# Patient Record
Sex: MALE | Race: WHITE | NOT HISPANIC OR LATINO | URBAN - METROPOLITAN AREA
[De-identification: names, ages, dates, MRNs, and addresses within clinical notes are randomized per-mention and may not be internally consistent; named-entity substitution may affect disease eponyms.]

---

## 2017-11-03 ENCOUNTER — EMERGENCY (EMERGENCY)
Facility: HOSPITAL | Age: 22
LOS: 0 days | Discharge: HOME | End: 2017-11-04
Attending: EMERGENCY MEDICINE | Admitting: EMERGENCY MEDICINE
Payer: COMMERCIAL

## 2017-11-03 VITALS — HEIGHT: 69 IN | WEIGHT: 139.99 LBS

## 2017-11-03 DIAGNOSIS — F31.9 BIPOLAR DISORDER, UNSPECIFIED: ICD-10-CM

## 2017-11-03 DIAGNOSIS — F19.94 OTHER PSYCHOACTIVE SUBSTANCE USE, UNSPECIFIED WITH PSYCHOACTIVE SUBSTANCE-INDUCED MOOD DISORDER: ICD-10-CM

## 2017-11-03 LAB
ALBUMIN SERPL ELPH-MCNC: 4.1 G/DL — SIGNIFICANT CHANGE UP (ref 3.3–5)
ALP SERPL-CCNC: 51 U/L — SIGNIFICANT CHANGE UP (ref 40–120)
ALT FLD-CCNC: 29 U/L — SIGNIFICANT CHANGE UP (ref 12–78)
AMPHET UR-MCNC: NEGATIVE — SIGNIFICANT CHANGE UP
ANION GAP SERPL CALC-SCNC: 6 MMOL/L — SIGNIFICANT CHANGE UP (ref 5–17)
APAP SERPL-MCNC: <2 UG/ML — LOW (ref 10–30)
APPEARANCE UR: CLEAR — SIGNIFICANT CHANGE UP
APTT BLD: 30.7 SEC — SIGNIFICANT CHANGE UP (ref 27.5–37.4)
AST SERPL-CCNC: 14 U/L — LOW (ref 15–37)
BARBITURATES UR SCN-MCNC: NEGATIVE — SIGNIFICANT CHANGE UP
BASOPHILS # BLD AUTO: 0.1 K/UL — SIGNIFICANT CHANGE UP (ref 0–0.2)
BASOPHILS NFR BLD AUTO: 0.8 % — SIGNIFICANT CHANGE UP (ref 0–2)
BENZODIAZ UR-MCNC: NEGATIVE — SIGNIFICANT CHANGE UP
BILIRUB SERPL-MCNC: 0.5 MG/DL — SIGNIFICANT CHANGE UP (ref 0.2–1.2)
BILIRUB UR-MCNC: NEGATIVE — SIGNIFICANT CHANGE UP
BUN SERPL-MCNC: 23 MG/DL — SIGNIFICANT CHANGE UP (ref 7–23)
CALCIUM SERPL-MCNC: 9.1 MG/DL — SIGNIFICANT CHANGE UP (ref 8.5–10.1)
CHLORIDE SERPL-SCNC: 109 MMOL/L — HIGH (ref 96–108)
CO2 SERPL-SCNC: 25 MMOL/L — SIGNIFICANT CHANGE UP (ref 22–31)
COCAINE METAB.OTHER UR-MCNC: NEGATIVE — SIGNIFICANT CHANGE UP
COLOR SPEC: YELLOW — SIGNIFICANT CHANGE UP
CREAT SERPL-MCNC: 1.18 MG/DL — SIGNIFICANT CHANGE UP (ref 0.5–1.3)
DIFF PNL FLD: NEGATIVE — SIGNIFICANT CHANGE UP
EOSINOPHIL # BLD AUTO: 0.1 K/UL — SIGNIFICANT CHANGE UP (ref 0–0.5)
EOSINOPHIL NFR BLD AUTO: 1.3 % — SIGNIFICANT CHANGE UP (ref 0–6)
ETHANOL SERPL-MCNC: <10 MG/DL — SIGNIFICANT CHANGE UP (ref 0–10)
GLUCOSE SERPL-MCNC: 79 MG/DL — SIGNIFICANT CHANGE UP (ref 70–99)
GLUCOSE UR QL: NEGATIVE MG/DL — SIGNIFICANT CHANGE UP
HCT VFR BLD CALC: 45.6 % — SIGNIFICANT CHANGE UP (ref 39–50)
HGB BLD-MCNC: 15.7 G/DL — SIGNIFICANT CHANGE UP (ref 13–17)
INR BLD: 1.19 RATIO — HIGH (ref 0.88–1.16)
KETONES UR-MCNC: (no result)
LEUKOCYTE ESTERASE UR-ACNC: NEGATIVE — SIGNIFICANT CHANGE UP
LYMPHOCYTES # BLD AUTO: 1.9 K/UL — SIGNIFICANT CHANGE UP (ref 1–3.3)
LYMPHOCYTES # BLD AUTO: 24.2 % — SIGNIFICANT CHANGE UP (ref 13–44)
MCHC RBC-ENTMCNC: 29.3 PG — SIGNIFICANT CHANGE UP (ref 27–34)
MCHC RBC-ENTMCNC: 34.4 GM/DL — SIGNIFICANT CHANGE UP (ref 32–36)
MCV RBC AUTO: 85.1 FL — SIGNIFICANT CHANGE UP (ref 80–100)
METHADONE UR-MCNC: NEGATIVE — SIGNIFICANT CHANGE UP
MONOCYTES # BLD AUTO: 0.4 K/UL — SIGNIFICANT CHANGE UP (ref 0–0.9)
MONOCYTES NFR BLD AUTO: 5.7 % — SIGNIFICANT CHANGE UP (ref 2–14)
NEUTROPHILS # BLD AUTO: 5.3 K/UL — SIGNIFICANT CHANGE UP (ref 1.8–7.4)
NEUTROPHILS NFR BLD AUTO: 68 % — SIGNIFICANT CHANGE UP (ref 43–77)
NITRITE UR-MCNC: NEGATIVE — SIGNIFICANT CHANGE UP
OPIATES UR-MCNC: POSITIVE — SIGNIFICANT CHANGE UP
PCP SPEC-MCNC: SIGNIFICANT CHANGE UP
PCP UR-MCNC: NEGATIVE — SIGNIFICANT CHANGE UP
PH UR: 6 — SIGNIFICANT CHANGE UP (ref 5–8)
PLATELET # BLD AUTO: 265 K/UL — SIGNIFICANT CHANGE UP (ref 150–400)
POTASSIUM SERPL-MCNC: 3.7 MMOL/L — SIGNIFICANT CHANGE UP (ref 3.5–5.3)
POTASSIUM SERPL-SCNC: 3.7 MMOL/L — SIGNIFICANT CHANGE UP (ref 3.5–5.3)
PROT SERPL-MCNC: 7.2 GM/DL — SIGNIFICANT CHANGE UP (ref 6–8.3)
PROT UR-MCNC: NEGATIVE MG/DL — SIGNIFICANT CHANGE UP
PROTHROM AB SERPL-ACNC: 12.9 SEC — HIGH (ref 9.8–12.7)
RBC # BLD: 5.36 M/UL — SIGNIFICANT CHANGE UP (ref 4.2–5.8)
RBC # FLD: 11.5 % — SIGNIFICANT CHANGE UP (ref 10.3–14.5)
SALICYLATES SERPL-MCNC: <1.7 MG/DL — LOW (ref 2.8–20)
SODIUM SERPL-SCNC: 140 MMOL/L — SIGNIFICANT CHANGE UP (ref 135–145)
SP GR SPEC: 1.02 — SIGNIFICANT CHANGE UP (ref 1.01–1.02)
THC UR QL: NEGATIVE — SIGNIFICANT CHANGE UP
UROBILINOGEN FLD QL: NEGATIVE MG/DL — SIGNIFICANT CHANGE UP
WBC # BLD: 7.8 K/UL — SIGNIFICANT CHANGE UP (ref 3.8–10.5)
WBC # FLD AUTO: 7.8 K/UL — SIGNIFICANT CHANGE UP (ref 3.8–10.5)

## 2017-11-03 PROCEDURE — 99284 EMERGENCY DEPT VISIT MOD MDM: CPT

## 2017-11-03 PROCEDURE — 70450 CT HEAD/BRAIN W/O DYE: CPT | Mod: 26

## 2017-11-03 PROCEDURE — 93010 ELECTROCARDIOGRAM REPORT: CPT

## 2017-11-03 RX ORDER — SODIUM CHLORIDE 9 MG/ML
1000 INJECTION INTRAMUSCULAR; INTRAVENOUS; SUBCUTANEOUS ONCE
Qty: 0 | Refills: 0 | Status: COMPLETED | OUTPATIENT
Start: 2017-11-03 | End: 2017-11-03

## 2017-11-03 RX ADMIN — SODIUM CHLORIDE 1500 MILLILITER(S): 9 INJECTION INTRAMUSCULAR; INTRAVENOUS; SUBCUTANEOUS at 19:46

## 2017-11-03 NOTE — ED BEHAVIORAL HEALTH ASSESSMENT NOTE - DETAILS
pt refusing to give consent to speak with the father claims the adderal and the buspar was making him more anxious and nervous pt susts father "with mood swings", and possibly his mother also mother with alcohol claims periods of vision is blurred pt suspects father "with mood swings", and possibly his mother also 1. pt to make appt with Dr Vinson and left message for their office to call pt for the appt

## 2017-11-03 NOTE — ED BEHAVIORAL HEALTH ASSESSMENT NOTE - FAMILY DETAILS
pt is living withhis father and the father's girlfriend pt is living with his father and the father's girlfriend

## 2017-11-03 NOTE — ED ADULT NURSE REASSESSMENT NOTE - NS ED NURSE REASSESS COMMENT FT1
Received patient in bed. A&Ox4, calm, cooperative. 1:1 in progress for safety. Pending psych consult. Fluids in progress. Will continue monitoring patient.

## 2017-11-03 NOTE — ED BEHAVIORAL HEALTH ASSESSMENT NOTE - OTHER PAST PSYCHIATRIC HISTORY (INCLUDE DETAILS REGARDING ONSET, COURSE OF ILLNESS, INPATIENT/OUTPATIENT TREATMENT)
denies any psychiatric hopitalization  sees Dr Vinson on a monthly basis denies any psychiatric hospitalization  sees Dr Vinson on a monthly basis, claimed that he would make a follow up appt with Dr Vinson

## 2017-11-03 NOTE — ED ADULT NURSE NOTE - OBJECTIVE STATEMENT
Pt c/o blurred vision, light flashes in eyes.  Pt states he concerned that he is being poisoned by his father.  Pt also thinks his father had been poisoning his mother.  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  Awaiting room assignment, 1:1 bedside.
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
## 2017-11-03 NOTE — ED BEHAVIORAL HEALTH ASSESSMENT NOTE - NS ED BHA PLAN TR BH CONTACTED FT
call the office of Dr Vinson and indicated that the pt was seen in the ER and needed a follow up appt.

## 2017-11-03 NOTE — ED ADULT TRIAGE NOTE - CHIEF COMPLAINT QUOTE
Pt. to the ED C/O Blurry vision with malaise and weakness x 3 weeks. Pt. states that he came into the ED today because he is feeling concerned and worst- Pt. states " I think I am being poisoned with Monoethylene" Pt. states he suspects because he has family problems. Pt. diaphoretic at this time and appears to be anxious. Pt. 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 Pt. denies trouble speaking, walking, CP and SOB- Hx. of Depression, ADHD and Anxiety- On Adderall, Antidepressants, and Buspar which states he has not taken in 3 days- Last ETOH intake as per patient was last night.

## 2017-11-03 NOTE — ED BEHAVIORAL HEALTH ASSESSMENT NOTE - ADDITIONAL DETAILS / COMMENTS
1. pt is aware that Dr Vinson will be contacted  at [phone], [phone]  2.  Also asked that Dr Vinson office to contact the pt to arrange for follow up visit  with Dr Vinson

## 2017-11-03 NOTE — ED BEHAVIORAL HEALTH ASSESSMENT NOTE - RISK ASSESSMENT
1. pt denies any suicidal ideation or plan .  He also denies any homicidal ideation ort plan  2. pt denies any prior suicidal  attempts  3. no prior psych hospitalization
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
## 2017-11-03 NOTE — ED BEHAVIORAL HEALTH ASSESSMENT NOTE - CURRENT MEDICATION
He has "stopped all of my medications for about a week , as it was making me not sleep well and making more anxious and nervous."

## 2017-11-03 NOTE — ED BEHAVIORAL HEALTH ASSESSMENT NOTE - DESCRIPTION
Pt with good eye contact Alert Speech is mildly pressured but goal directed. He was guarded at first but claims that he wanted to tell about his concerns about his father especially since he had periods of blurred vision. Pt still is under the care of Dr Vinson and he indicated that he was taking the adderall buspar  for the ADHD, however he was "taking only half the dose of the medication "it was too strong for me and I couldn't sleep and it was making me more nervous and had trouble sleeping. He indicated that he is planning to be more "open" with Psychiatrist and to tell her what he is dealing with at home.  "I know that its possible that my eyes are blurred due to the studying and all the reading, or maybe that my father is really doing this. " He indicated that he was going to "have my eye s checked out and I want to talk to my Psychiatrist so she would know what I'm concerned about. "  He denied any suicidal ideation or plan. Denied any homicidal ideation or plan . denies pt claims most of his friends are in law school Pt with good eye contact Alert Speech is mildly pressured but goal directed. He was guarded at first but claims that he wanted to tell about his concerns about his father especially since he had periods of blurred vision. Pt still is under the care of Dr Vinson and he indicated that he was taking the adderall buspar  for the ADHD, however he was "taking only half the dose of the medication "it was too strong for me and I couldn't sleep and it was making me more nervous and had trouble sleeping. He indicated that he is planning to be more "open" with Psychiatrist and to tell her what he is dealing with at home.  "I know that its possible that my eyes are blurred due to the studying and all the reading, or maybe that my father is really doing this. " He indicated that he was going to "have my eye s checked out and I want to talk to my Psychiatrist so she would know what I'm concerned about. "  He denied any suicidal ideation or plan. Denied any homicidal ideation or plan .  Pt periodically questioned his own belief "could be that I'm studying too much."

## 2017-11-03 NOTE — ED ADULT NURSE REASSESSMENT NOTE - NS ED NURSE REASSESS COMMENT FT1
Patient sleeping, cooperative, pending psych consult. 1:1 continues. Will continue monitoring patient.

## 2017-11-03 NOTE — ED STATDOCS - NS_ ATTENDINGSCRIBEDETAILS _ED_A_ED_FT
I, Tony Underwood DO, performed the initial face to face bedside interview with this patient regarding history of present illness and determined that the patient should be seen in the main ED.  The history, was documented by the scribe in my presence and I attest to the accuracy of the documentation.

## 2017-11-04 VITALS
TEMPERATURE: 98 F | HEART RATE: 85 BPM | DIASTOLIC BLOOD PRESSURE: 70 MMHG | OXYGEN SATURATION: 98 % | RESPIRATION RATE: 18 BRPM | SYSTOLIC BLOOD PRESSURE: 127 MMHG

## 2017-11-04 LAB — OSMOLALITY SERPL: 299 MOS/KG — SIGNIFICANT CHANGE UP (ref 275–300)

## 2017-11-04 NOTE — ED PROVIDER NOTE - MEDICAL DECISION MAKING DETAILS
No signs of acute methanol poisoning on labs, normal anion gap, normal serum osm.  CT head WNL.  Pt cleared by psychiatry.  Okay for d/c home.  F/u with psychiatry, ophthalmology.  Return precautions givne.

## 2017-11-04 NOTE — ED PROVIDER NOTE - CARE PLAN
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
## 2017-11-08 PROBLEM — Z00.00 ENCOUNTER FOR PREVENTIVE HEALTH EXAMINATION: Status: ACTIVE | Noted: 2017-11-08

## 2017-11-09 DIAGNOSIS — F19.94 OTHER PSYCHOACTIVE SUBSTANCE USE, UNSPECIFIED WITH PSYCHOACTIVE SUBSTANCE-INDUCED MOOD DISORDER: ICD-10-CM

## 2017-11-09 DIAGNOSIS — H53.149 VISUAL DISCOMFORT, UNSPECIFIED: ICD-10-CM

## 2017-11-09 DIAGNOSIS — R53.83 OTHER FATIGUE: ICD-10-CM

## 2017-11-09 DIAGNOSIS — F31.64 BIPOLAR DISORDER, CURRENT EPISODE MIXED, SEVERE, WITH PSYCHOTIC FEATURES: ICD-10-CM

## 2017-11-09 LAB
FORMATE [MASS/VOLUME] IN SERUM OR PLASMA: 3 MCG/ML — SIGNIFICANT CHANGE UP
FORMATE [MASS/VOLUME] IN SERUM OR PLASMA: SIGNIFICANT CHANGE UP MCG/ML

## 2017-12-18 ENCOUNTER — APPOINTMENT (OUTPATIENT)
Dept: NEUROLOGY | Facility: CLINIC | Age: 22
End: 2017-12-18
Payer: COMMERCIAL

## 2017-12-18 VITALS
BODY MASS INDEX: 20.14 KG/M2 | HEIGHT: 69 IN | WEIGHT: 136 LBS | DIASTOLIC BLOOD PRESSURE: 78 MMHG | OXYGEN SATURATION: 98 % | SYSTOLIC BLOOD PRESSURE: 120 MMHG | HEART RATE: 68 BPM

## 2017-12-18 PROCEDURE — 99204 OFFICE O/P NEW MOD 45 MIN: CPT

## 2017-12-19 RX ORDER — FLUVOXAMINE MALEATE 25 MG/1
25 TABLET, FILM COATED ORAL
Qty: 60 | Refills: 0 | Status: DISCONTINUED | COMMUNITY
Start: 2017-08-29

## 2017-12-19 RX ORDER — ARIPIPRAZOLE 2 MG/1
2 TABLET ORAL
Qty: 30 | Refills: 0 | Status: DISCONTINUED | COMMUNITY
Start: 2017-11-15

## 2018-01-08 ENCOUNTER — OUTPATIENT (OUTPATIENT)
Dept: OUTPATIENT SERVICES | Facility: HOSPITAL | Age: 23
LOS: 1 days | End: 2018-01-08
Payer: COMMERCIAL

## 2018-01-08 DIAGNOSIS — F44.89 OTHER DISSOCIATIVE AND CONVERSION DISORDERS: ICD-10-CM

## 2018-01-08 PROCEDURE — 95812 EEG 41-60 MINUTES: CPT

## 2018-01-23 ENCOUNTER — APPOINTMENT (OUTPATIENT)
Dept: NEUROLOGY | Facility: CLINIC | Age: 23
End: 2018-01-23

## 2018-01-23 PROBLEM — F90.1 ADHD (ATTENTION DEFICIT HYPERACTIVITY DISORDER), PREDOMINANTLY HYPERACTIVE IMPULSIVE TYPE: Status: ACTIVE | Noted: 2018-01-23

## 2018-01-24 ENCOUNTER — APPOINTMENT (OUTPATIENT)
Dept: NEUROLOGY | Facility: CLINIC | Age: 23
End: 2018-01-24
Payer: COMMERCIAL

## 2018-01-24 VITALS
BODY MASS INDEX: 20.44 KG/M2 | OXYGEN SATURATION: 96 % | HEIGHT: 69 IN | SYSTOLIC BLOOD PRESSURE: 120 MMHG | HEART RATE: 98 BPM | WEIGHT: 138 LBS | DIASTOLIC BLOOD PRESSURE: 60 MMHG

## 2018-01-24 DIAGNOSIS — F44.89 OTHER DISSOCIATIVE AND CONVERSION DISORDERS: ICD-10-CM

## 2018-01-24 DIAGNOSIS — F22 DELUSIONAL DISORDERS: ICD-10-CM

## 2018-01-24 PROCEDURE — 99213 OFFICE O/P EST LOW 20 MIN: CPT

## 2018-01-24 RX ORDER — DEXTROAMPHETAMINE SULFATE 30 MG/1
30 TABLET ORAL
Qty: 30 | Refills: 0 | Status: DISCONTINUED | COMMUNITY
Start: 2017-08-29 | End: 2018-01-24

## 2018-01-24 RX ORDER — LISDEXAMFETAMINE DIMESYLATE 30 MG/1
30 CAPSULE ORAL
Qty: 30 | Refills: 0 | Status: DISCONTINUED | COMMUNITY
Start: 2017-08-29 | End: 2018-01-24

## 2018-01-24 RX ORDER — BUSPIRONE HYDROCHLORIDE 5 MG/1
5 TABLET ORAL
Qty: 60 | Refills: 0 | Status: DISCONTINUED | COMMUNITY
Start: 2017-08-29 | End: 2018-01-24

## 2018-01-25 PROBLEM — F22 PARANOID IDEATION: Status: ACTIVE | Noted: 2018-01-25

## 2018-01-25 PROBLEM — F44.89 CONFUSIONAL STATE: Status: RESOLVED | Noted: 2017-12-18 | Resolved: 2018-01-25

## 2018-01-26 ENCOUNTER — APPOINTMENT (OUTPATIENT)
Dept: NEUROLOGY | Facility: CLINIC | Age: 23
End: 2018-01-26

## 2018-01-26 DIAGNOSIS — F90.1 ATTENTION-DEFICIT HYPERACTIVITY DISORDER, PREDOMINANTLY HYPERACTIVE TYPE: ICD-10-CM

## 2018-04-20 ENCOUNTER — APPOINTMENT (OUTPATIENT)
Dept: NEUROLOGY | Facility: CLINIC | Age: 23
End: 2018-04-20

## 2019-03-05 NOTE — ED BEHAVIORAL HEALTH ASSESSMENT NOTE - NS ED BHA BILLING ATTENDING WO NP TRAINEE
Patient is complaining about the early morning when he tried to clear his throat he gets the sputum which is brownish in sometimes some blood in it he was seen by allergist who recommended a chest x-ray for further evaluation chest x-ray is normal patient is about 40 years smoking history he quit smoking about 35 years ago I think it is all postnasal drip and dry area of the mucosa which is causing some bloody postnasal drip but since he has a smoking history I would like to get a CT scan of the chest for further evaluation to rule out any lesion in the lung he really does not have any other symptoms he is not coughing regularly he has a problem with the allergies and sinus problems
Patient was seen last time for leg cramps the leg cramps are better than before he is using the tonic water, they are not completely gone but they are better than before
[id number]

## 2023-08-13 NOTE — ED STATDOCS - NS ED PATIENT SAFETY CONCERN
Right lateral 10th rib injury.  Received abx in the ED.  Aggressive pulmonary hygiene and multimodal pain management.   No

## 2024-05-01 NOTE — ED BEHAVIORAL HEALTH ASSESSMENT NOTE - REMOTE MEMORY
Neisha  Pending    Insurance response  Prescription Drug Insurance: [place] Medicaid   Notes: Faxed prior authorization request to [phone] for signature. Please sign and fax to [facility] [phone], and they will complete the PA.     Please update encounter when faxed to pharmacy. Thank you!      Normal

## 2025-02-07 ENCOUNTER — INPATIENT (INPATIENT)
Facility: HOSPITAL | Age: 30
LOS: 10 days | Discharge: HOME | DRG: 751 | End: 2025-02-18
Attending: PSYCHIATRY & NEUROLOGY | Admitting: PSYCHIATRY & NEUROLOGY
Payer: MEDICAID

## 2025-02-07 VITALS
OXYGEN SATURATION: 100 % | SYSTOLIC BLOOD PRESSURE: 129 MMHG | DIASTOLIC BLOOD PRESSURE: 72 MMHG | TEMPERATURE: 98 F | HEART RATE: 66 BPM | RESPIRATION RATE: 18 BRPM

## 2025-02-07 DIAGNOSIS — F31.9 BIPOLAR DISORDER, UNSPECIFIED: ICD-10-CM

## 2025-02-07 DIAGNOSIS — F29 UNSPECIFIED PSYCHOSIS NOT DUE TO A SUBSTANCE OR KNOWN PHYSIOLOGICAL CONDITION: ICD-10-CM

## 2025-02-07 PROBLEM — F32.9 MAJOR DEPRESSIVE DISORDER, SINGLE EPISODE, UNSPECIFIED: Chronic | Status: ACTIVE | Noted: 2017-11-07

## 2025-02-07 PROBLEM — F41.9 ANXIETY DISORDER, UNSPECIFIED: Chronic | Status: ACTIVE | Noted: 2017-11-07

## 2025-02-07 LAB
ALBUMIN SERPL ELPH-MCNC: 4.3 G/DL — SIGNIFICANT CHANGE UP (ref 3.3–5)
ALP SERPL-CCNC: 40 U/L — SIGNIFICANT CHANGE UP (ref 40–120)
ALT FLD-CCNC: 38 U/L — SIGNIFICANT CHANGE UP (ref 12–78)
AMPHET UR-MCNC: NEGATIVE — SIGNIFICANT CHANGE UP
ANION GAP SERPL CALC-SCNC: 4 MMOL/L — LOW (ref 5–17)
APAP SERPL-MCNC: 20 UG/ML — SIGNIFICANT CHANGE UP (ref 10–30)
APPEARANCE UR: CLEAR — SIGNIFICANT CHANGE UP
AST SERPL-CCNC: 26 U/L — SIGNIFICANT CHANGE UP (ref 15–37)
BARBITURATES UR SCN-MCNC: NEGATIVE — SIGNIFICANT CHANGE UP
BASOPHILS # BLD AUTO: 0.03 K/UL — SIGNIFICANT CHANGE UP (ref 0–0.2)
BASOPHILS NFR BLD AUTO: 0.6 % — SIGNIFICANT CHANGE UP (ref 0–2)
BENZODIAZ UR-MCNC: NEGATIVE — SIGNIFICANT CHANGE UP
BILIRUB SERPL-MCNC: 0.4 MG/DL — SIGNIFICANT CHANGE UP (ref 0.2–1.2)
BILIRUB UR-MCNC: NEGATIVE — SIGNIFICANT CHANGE UP
BUN SERPL-MCNC: 23 MG/DL — SIGNIFICANT CHANGE UP (ref 7–23)
CALCIUM SERPL-MCNC: 9.8 MG/DL — SIGNIFICANT CHANGE UP (ref 8.5–10.1)
CHLORIDE SERPL-SCNC: 105 MMOL/L — SIGNIFICANT CHANGE UP (ref 96–108)
CO2 SERPL-SCNC: 27 MMOL/L — SIGNIFICANT CHANGE UP (ref 22–31)
COCAINE METAB.OTHER UR-MCNC: NEGATIVE — SIGNIFICANT CHANGE UP
COLOR SPEC: YELLOW — SIGNIFICANT CHANGE UP
CREAT SERPL-MCNC: 1.41 MG/DL — HIGH (ref 0.5–1.3)
DIFF PNL FLD: NEGATIVE — SIGNIFICANT CHANGE UP
EGFR: 69 ML/MIN/1.73M2 — SIGNIFICANT CHANGE UP
EOSINOPHIL # BLD AUTO: 0.07 K/UL — SIGNIFICANT CHANGE UP (ref 0–0.5)
EOSINOPHIL NFR BLD AUTO: 1.3 % — SIGNIFICANT CHANGE UP (ref 0–6)
ETHANOL SERPL-MCNC: <10 MG/DL — SIGNIFICANT CHANGE UP (ref 0–10)
FENTANYL UR QL SCN: NEGATIVE — SIGNIFICANT CHANGE UP
FLUAV AG NPH QL: SIGNIFICANT CHANGE UP
FLUBV AG NPH QL: SIGNIFICANT CHANGE UP
GLUCOSE SERPL-MCNC: 108 MG/DL — HIGH (ref 70–99)
GLUCOSE UR QL: NEGATIVE MG/DL — SIGNIFICANT CHANGE UP
HCT VFR BLD CALC: 40.8 % — SIGNIFICANT CHANGE UP (ref 39–50)
HGB BLD-MCNC: 14 G/DL — SIGNIFICANT CHANGE UP (ref 13–17)
IMM GRANULOCYTES NFR BLD AUTO: 0.2 % — SIGNIFICANT CHANGE UP (ref 0–0.9)
KETONES UR-MCNC: NEGATIVE MG/DL — SIGNIFICANT CHANGE UP
LEUKOCYTE ESTERASE UR-ACNC: NEGATIVE — SIGNIFICANT CHANGE UP
LYMPHOCYTES # BLD AUTO: 0.94 K/UL — LOW (ref 1–3.3)
LYMPHOCYTES # BLD AUTO: 17.7 % — SIGNIFICANT CHANGE UP (ref 13–44)
MCHC RBC-ENTMCNC: 28.7 PG — SIGNIFICANT CHANGE UP (ref 27–34)
MCHC RBC-ENTMCNC: 34.3 G/DL — SIGNIFICANT CHANGE UP (ref 32–36)
MCV RBC AUTO: 83.6 FL — SIGNIFICANT CHANGE UP (ref 80–100)
METHADONE UR-MCNC: NEGATIVE — SIGNIFICANT CHANGE UP
MONOCYTES # BLD AUTO: 0.36 K/UL — SIGNIFICANT CHANGE UP (ref 0–0.9)
MONOCYTES NFR BLD AUTO: 6.8 % — SIGNIFICANT CHANGE UP (ref 2–14)
NEUTROPHILS # BLD AUTO: 3.9 K/UL — SIGNIFICANT CHANGE UP (ref 1.8–7.4)
NEUTROPHILS NFR BLD AUTO: 73.4 % — SIGNIFICANT CHANGE UP (ref 43–77)
NITRITE UR-MCNC: NEGATIVE — SIGNIFICANT CHANGE UP
OPIATES UR-MCNC: NEGATIVE — SIGNIFICANT CHANGE UP
PCP SPEC-MCNC: SIGNIFICANT CHANGE UP
PCP UR-MCNC: NEGATIVE — SIGNIFICANT CHANGE UP
PH UR: 6.5 — SIGNIFICANT CHANGE UP (ref 5–8)
PLATELET # BLD AUTO: 219 K/UL — SIGNIFICANT CHANGE UP (ref 150–400)
POTASSIUM SERPL-MCNC: 3.8 MMOL/L — SIGNIFICANT CHANGE UP (ref 3.5–5.3)
POTASSIUM SERPL-SCNC: 3.8 MMOL/L — SIGNIFICANT CHANGE UP (ref 3.5–5.3)
PROT SERPL-MCNC: 6.9 GM/DL — SIGNIFICANT CHANGE UP (ref 6–8.3)
PROT UR-MCNC: NEGATIVE MG/DL — SIGNIFICANT CHANGE UP
RBC # BLD: 4.88 M/UL — SIGNIFICANT CHANGE UP (ref 4.2–5.8)
RBC # FLD: 12.9 % — SIGNIFICANT CHANGE UP (ref 10.3–14.5)
RSV RNA NPH QL NAA+NON-PROBE: SIGNIFICANT CHANGE UP
SALICYLATES SERPL-MCNC: <1.7 MG/DL — LOW (ref 2.8–20)
SARS-COV-2 RNA SPEC QL NAA+PROBE: SIGNIFICANT CHANGE UP
SODIUM SERPL-SCNC: 136 MMOL/L — SIGNIFICANT CHANGE UP (ref 135–145)
SP GR SPEC: 1.01 — SIGNIFICANT CHANGE UP (ref 1–1.03)
THC UR QL: NEGATIVE — SIGNIFICANT CHANGE UP
UROBILINOGEN FLD QL: 0.2 MG/DL — SIGNIFICANT CHANGE UP (ref 0.2–1)
WBC # BLD: 5.31 K/UL — SIGNIFICANT CHANGE UP (ref 3.8–10.5)
WBC # FLD AUTO: 5.31 K/UL — SIGNIFICANT CHANGE UP (ref 3.8–10.5)

## 2025-02-07 PROCEDURE — 90792 PSYCH DIAG EVAL W/MED SRVCS: CPT | Mod: 95

## 2025-02-07 PROCEDURE — 80048 BASIC METABOLIC PNL TOTAL CA: CPT

## 2025-02-07 PROCEDURE — 76770 US EXAM ABDO BACK WALL COMP: CPT

## 2025-02-07 PROCEDURE — 36415 COLL VENOUS BLD VENIPUNCTURE: CPT

## 2025-02-07 PROCEDURE — 99285 EMERGENCY DEPT VISIT HI MDM: CPT

## 2025-02-07 PROCEDURE — 82550 ASSAY OF CK (CPK): CPT

## 2025-02-07 PROCEDURE — 83036 HEMOGLOBIN GLYCOSYLATED A1C: CPT

## 2025-02-07 RX ORDER — LORAZEPAM 4 MG/ML
1 VIAL (ML) INJECTION THREE TIMES A DAY
Refills: 0 | Status: DISCONTINUED | OUTPATIENT
Start: 2025-02-07 | End: 2025-02-08

## 2025-02-07 RX ORDER — OLANZAPINE 10 MG/1
2.5 TABLET ORAL DAILY
Refills: 0 | Status: DISCONTINUED | OUTPATIENT
Start: 2025-02-07 | End: 2025-02-08

## 2025-02-07 RX ORDER — OLANZAPINE 10 MG/1
5 TABLET ORAL AT BEDTIME
Refills: 0 | Status: DISCONTINUED | OUTPATIENT
Start: 2025-02-07 | End: 2025-02-10

## 2025-02-07 NOTE — ED ADULT NURSE REASSESSMENT NOTE - NS ED NURSE REASSESS COMMENT FT1
Plan of care assumed from HERNANDEZ Azar. Pt. resting in stretcher, awaiting  to 5N. Pt. endorsing no further complaints at this time.

## 2025-02-07 NOTE — ED PROVIDER NOTE - CLINICAL SUMMARY MEDICAL DECISION MAKING FREE TEXT BOX
29-year-old male with history of ADHD not on medications reports that he threw a private property today and then had a panic attack.  Patient is repetitive with tangential thoughts reporting that he needs to see someone so he does not throw private property again.  Patient placed on constant observation psych consult medical screening exam. 29-year-old male with history of ADHD not on medications reports that he threw a private property today and then had a panic attack.  Patient is repetitive with tangential thoughts reporting that he needs to see someone so he does not throw private property again.  Patient placed on constant observation psych consult medical screening exam.    Lula DO: patient is medically clear; seen by tele psych; to be admitted to [address] [place] [address].39 paperwork- filled out and faxed to tele psych as requested.

## 2025-02-07 NOTE — ED BEHAVIORAL HEALTH NOTE - BEHAVIORAL HEALTH NOTE
===================  PRE-HOSPITAL COURSE  ===================  SOURCE:  RN and secondhand ED documentation  DETAILS:  Per chart, patient was BIBEMS activated by self, endorsing panic attack. No SI.   =========  ED COURSE  =========  SOURCE:  RN and secondhand ED documentation.  BELONGINGS:  No belongings of note. All belongings were provided to hospital security, and patient currently in a gown with a 1:1 staff member.  BEHAVIOR: RN described patient to be currently perseverative on throwing out his belongings for unclear reason, appears very anxious and manic, impatient, asking to see psychiatrist immediately.  Pt remains in good behavioral and impulse control, is not currently violent/aggressive.  TREATMENT:  Per chart and RN, patient did not require PRN medications.   COLLATERAL:  his uncle, lAf Pastrana, but doesn't know his phone number?
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
## 2025-02-07 NOTE — ED PROVIDER NOTE - INTERNATIONAL TRAVEL
You were hospitalized for pancreatitis.  This is related to a stone in your pancreatic duct.   We consulted your GI team and at this time they did not recommend removing the stone as this is challenging to do in the location it is at and can worsen pancreatitis.   Please avoid fatty foods. Eat bland diet and keep up with hydration with lot of fluid intake.  Please note, it is very important for you to be followed by the GI doctors/team in outpatient setting. We have asked them to make an appointment and the information is in your discharge summary. Please be sure to call if you do not hear from their clinic within a week of discharge   No

## 2025-02-07 NOTE — ED BEHAVIORAL HEALTH ASSESSMENT NOTE - RISK ASSESSMENT
at elevated risk of aggression due to acute mood disturbance, impaired insight, threatening comments, unmedicated antonia

## 2025-02-07 NOTE — ED ADULT NURSE REASSESSMENT NOTE - NS ED NURSE REASSESS COMMENT FT1
pt met w/ telepsych Dr. Brody, pt is to be admitted and is aware of POC. awaiting orders. pt has been requesting a shower since admission, and continues to perseverate on being able to take a shower, pt was told he cannot shower here in ED, he can when he is admitted to room. pt is calm and cooperative at this time. no other complaints at this time. Safety measures in place.

## 2025-02-07 NOTE — ED ADULT TRIAGE NOTE - GLASGOW COMA SCALE: BEST MOTOR RESPONSE, MLM
Continuity of Care Form    Patient Name: Sharad Helton   :  1933  MRN:  [id number]    Admit date:  2021  Discharge date:  21    Code Status Order: Full Code   Advance Directives:      Admitting Physician:  Ita Lee DO  PCP: Glenda Montalvo MD    Discharging Nurse: [facility] Unit/Room#: 5742/7800-T  Discharging Unit Phone Number: [phone]    Emergency Contact:   Extended Emergency Contact Information  Primary Emergency Contact:  [address] Phone: [phone]  Relation: Other  Secondary Emergency Contact: [address] Phone: [phone]  Relation: Brother/Sister    Past Surgical History:  Past Surgical History:   Procedure Laterality Date    CARDIAC DEFIBRILLATOR PLACEMENT  2011    CARDIAC DEFIBRILLATOR PLACEMENT Left 2017    Bi-V gen change, [employer], WRAP-IT study,     CARPAL TUNNEL RELEASE      right arm    CATARACT REMOVAL WITH IMPLANT      DIAGNOSTIC CARDIAC CATH LAB PROCEDURE  3/23/10    Dr. Hien Hopper    left eye       Immunization History:   Immunization History   Administered Date(s) Administered    COVID-19, Pfizer, PF, 30mcg/0.3mL 2021, 2021    Influenza Vaccine, unspecified formulation 10/30/2009, 2012, 2014, 2015, 10/18/2016, 2017    Influenza Virus Vaccine 2012, 11/10/2014, 2015, 10/18/2016, 2017, 2019    Influenza, High Dose (Fluzone 65 yrs and older) 2018    Influenza, Quadv, IM, PF (6 mo and older Fluzone, Flulaval, Fluarix, and 3 yrs and older Afluria) 2019    Influenza, Quadv, adjuvanted, 65 yrs +, IM, PF (Fluad) 10/28/2020    Influenza, Triv, inactivated, subunit, adjuvanted, IM (Fluad 65 yrs and older) 2019    Pneumococcal Polysaccharide ([id number]) 2019    Td (Adult), 5 Lf Tetanus Toxoid, Pf (Tenivac, Decavac) 2021       Active Problems:  Patient Active Problem List   Diagnosis Code    Cardiomyopathy, nonischemic ([facility]) I42.8    Bundle branch block, left I44.7    Asthma J45.909    Biventricular implantable cardioverter-defibrillator in situ Z95.810    CAD (coronary artery disease) I25.10    Diabetes 1.5, 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    Patient in clinical research study Z00.6    Insomnia due to medical condition G47.01    Late onset Alzheimer's disease with behavioral disturbance ([facility]) G30.1, F02.81    PHOEBE (acute kidney injury) ([facility] N17.9    Failure to thrive in adult R62.7    Mild protein-calorie malnutrition ([facility] E44.1    Inferior pubic ramus fracture, left, closed, initial encounter ([facility] S32.472F       Isolation/Infection:   Isolation            No Isolation          Patient Infection Status       Infection Onset Added Last Indicated Last Indicated By Review Planned Expiration Resolved Resolved By    None active    Resolved    COVID-19 (Rule Out) 06/15/21 06/15/21 06/15/21 Respiratory Panel, Molecular, with COVID-19 (Restricted: peds pts or suitable admitted adults) (Ordered)   06/15/21 Rule-Out Test Resulted    COVID-19 (Rule Out) 05/29/20 05/29/20 05/29/20 COVID-19 Ambulatory (Ordered)   06/02/20 Rule-Out Test Resulted            Nurse Assessment:  Last Vital Signs: BP (!) 111/56   Pulse 68   Temp 98 °F (36.7 °C) (Oral)   Resp 16   SpO2 97%     Last documented pain score (0-10 scale):    Last Weight:   Wt Readings from Last 1 Encounters:   11/21/21 145 lb (65.8 kg)     Mental Status:  disoriented, oriented, alert, and patient gets confused & needs re orientation    IV Access:  - None    Nursing Mobility/ADLs:  Walking   Dependent  Transfer  Assisted  Bathing  Assisted  Dressing  Assisted  Toileting  Assisted  Feeding  Independent  Med Admin  Independent  Med Delivery   whole    Wound Care Documentation and Therapy:        Elimination:  Continence:    Bowel: Yes  Bladder: Yes and incontinent and uses bedside commode  Urinary Catheter: None Colostomy/Ileostomy/Ileal Conduit: No       Date of Last BM: 12/23/21  No intake or output data in the 24 hours ending 12/23/21 1001  No intake/output data recorded. Safety Concerns:     History of Falls (last 30 days), At Risk for Falls, and confusion at times    Impairments/Disabilities:      Hearing    Nutrition Therapy:  Current Nutrition Therapy:   - Oral Diet:  General    Routes of Feeding: Oral  Liquids: No Restrictions  Daily Fluid Restriction: no  Last Modified Barium Swallow with Video (Video Swallowing Test): not done    Treatments at the Time of Hospital Discharge:   Respiratory Treatments: ***  Oxygen Therapy:  is not on home oxygen therapy. Ventilator:    - No ventilator support    Rehab Therapies: Physical Therapy and Occupational Therapy  Weight Bearing Status/Restrictions: No weight bearing restirctions  Other Medical Equipment (for information only, NOT a DME order):  wheelchair, walker, and bedside commode  Other Treatments: ***    Patient's personal belongings (please select all that are sent with patient):  {[facility] DME Belongings:[id number]}    RN SIGNATURE:  Electronically signed by Sofia Waller RN on 12/23/21 at 2:15 PM EST    CASE MANAGEMENT/SOCIAL WORK SECTION    Inpatient Status Date: ***    Readmission Risk Assessment Score:  Readmission Risk              Risk of Unplanned Readmission:  19           Discharging to Facility/ Agency   Name: [facility]  Address:  Phone: [phone]  Fax:[phone]    Dialysis Facility (if applicable)   Name:  Address:  Dialysis Schedule:  Phone:  Fax:    / signature: Electronically signed by EVANS Joshi on 12/23/2021 at 2:01 PM    PHYSICIAN SECTION    Prognosis: Fair    Condition at Discharge: Stable    Rehab Potential (if transferring to Rehab):  Fair    Recommended Labs or Other Treatments After Discharge: none    Physician Certification: I certify the above information and transfer of [address]  is (M6) obeys commands

## 2025-02-07 NOTE — ED ADULT NURSE NOTE - OBJECTIVE STATEMENT
Pt presents to ED BIBEMS from home c/o panic attack. Pt states he was at his "childhood home", where he currently resides w/ his dad, throwing away various items when onset of panic attack occurred, pt states this is first time he has ever had panic attack, "I just felt like I was freaking out, and I wanted to be checked out." Pt expressed wanting to be evaluated by MD for anxiety and depression. Pt denies any pain, SOB, dizziness, headache, vision changes, hallucinations at this time, denies any psychiatric or pertinent medical history. NKDA. Pt denies any SI/HI. Pt is calm and cooperative at this time, aware of POC, awaiting psych eval, no other complaints at this time, safety measures in place, belongings brought to security.

## 2025-02-07 NOTE — ED BEHAVIORAL HEALTH ASSESSMENT NOTE - SUMMARY
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    Pt presents with odd behavior, pressured speech, flight of ideas, irritability consistent with manic symptoms and possible bipolar disorder. At this time, it is difficult to clearly distinguish manic behaviors from more psychotic like symptoms. Pt is requesting hospitalization and meets involuntary criteria as he is illogical, showing impaired insight, making violent threatening comments about his father.

## 2025-02-07 NOTE — ED PROVIDER NOTE - OBJECTIVE STATEMENT
Patient is a 29-year-old male with history of ADHD not currently on any medications who reports that he decided to throw away multiple items on private property including TV and electric heater and important papers.  Patient proceeded to then have panic attack and called ambulance.  Patient denies any SI HI reports that he lives with his father who does not know he is in the emergency department at this time.  Patient reports that his father has a restraining order against him and patient has had similar incident  in the past where he had a panic attack and had thrown out property.  Patient reports that he would like to meet with someone so that he does not "throw out private property again."

## 2025-02-07 NOTE — ED ADULT TRIAGE NOTE - CHIEF COMPLAINT QUOTE
Patient presents to the ER with complaints of panic attack. Patient states he started throwing out his tv, keys, heat warmer to clean up his room. Denies SI/HI, druge or alcohol use. Patient calm and cooperative at this time. MD Cotton aware.

## 2025-02-07 NOTE — ED BEHAVIORAL HEALTH ASSESSMENT NOTE - PSYCHIATRIC ISSUES AND PLAN (INCLUDE STANDING AND PRN MEDICATION)
will start olanzapine 2.5mg daily, 5mg qhs, though pt is refusing at this time until further discussion w/ unit psychiatrist

## 2025-02-07 NOTE — ED ADULT NURSE NOTE - NSFALLRISKINTERV_ED_ALL_ED
Assistance OOB with selected safe patient handling equipment if applicable/Assistance with ambulation/Communicate fall risk and risk factors to all staff, patient, and family/Monitor gait and stability/Monitor for mental status changes and reorient to person, place, and time, as needed/Provide visual cue: yellow wristband, yellow gown, etc/Reinforce activity limits and safety measures with patient and family/Toileting schedule using arm’s reach rule for commode and bathroom/Use of alarms - bed, stretcher, chair and/or video monitoring/Call bell, personal items and telephone in reach/Instruct patient to call for assistance before getting out of bed/chair/stretcher/Non-slip footwear applied when patient is off stretcher/[place] to call system/Physically safe environment - no spills, clutter or unnecessary equipment/Purposeful Proactive Rounding/Room/bathroom lighting operational, light cord in reach

## 2025-02-08 PROCEDURE — 99222 1ST HOSP IP/OBS MODERATE 55: CPT

## 2025-02-08 RX ORDER — MELATONIN 5 MG
5 TABLET ORAL AT BEDTIME
Refills: 0 | Status: DISCONTINUED | OUTPATIENT
Start: 2025-02-08 | End: 2025-02-18

## 2025-02-08 RX ORDER — LORAZEPAM 4 MG/ML
2 VIAL (ML) INJECTION EVERY 6 HOURS
Refills: 0 | Status: DISCONTINUED | OUTPATIENT
Start: 2025-02-08 | End: 2025-02-15

## 2025-02-08 RX ORDER — LORAZEPAM 4 MG/ML
1 VIAL (ML) INJECTION EVERY 4 HOURS
Refills: 0 | Status: DISCONTINUED | OUTPATIENT
Start: 2025-02-08 | End: 2025-02-09

## 2025-02-08 RX ORDER — ESCITALOPRAM OXALATE 20 MG/1
5 TABLET ORAL DAILY
Refills: 0 | Status: DISCONTINUED | OUTPATIENT
Start: 2025-02-08 | End: 2025-02-10

## 2025-02-08 RX ORDER — OLANZAPINE 10 MG/1
2.5 TABLET ORAL EVERY 6 HOURS
Refills: 0 | Status: DISCONTINUED | OUTPATIENT
Start: 2025-02-08 | End: 2025-02-18

## 2025-02-08 RX ORDER — OLANZAPINE 10 MG/1
5 TABLET ORAL EVERY 6 HOURS
Refills: 0 | Status: DISCONTINUED | OUTPATIENT
Start: 2025-02-08 | End: 2025-02-18

## 2025-02-08 RX ADMIN — Medication 1 MILLIGRAM(S): at 20:28

## 2025-02-08 RX ADMIN — OLANZAPINE 5 MILLIGRAM(S): 10 TABLET ORAL at 20:29

## 2025-02-08 RX ADMIN — Medication 1 MILLIGRAM(S): at 14:35

## 2025-02-08 RX ADMIN — ESCITALOPRAM OXALATE 5 MILLIGRAM(S): 20 TABLET ORAL at 14:35

## 2025-02-08 NOTE — BH INPATIENT PSYCHIATRY ASSESSMENT NOTE - NSBHASSESSSUMMFT_PSY_ALL_CORE
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    Pt presents with odd behavior, pressured speech, flight of ideas, irritability consistent with manic symptoms and possible bipolar disorder. At this time, it is difficult to clearly distinguish manic behaviors from more psychotic like symptoms. Pt is requesting hospitalization and meets involuntary criteria as he is illogical, showing impaired insight, making violent threatening comments about his father.    Plan:    Admit to inpatient level of care.    Medication discussed with patient because he is refusing to take any.    Educated about his current symptoms and that both patient and provider agree that it is suffering.  In order to address the suffering and discomfort we used medication.  Educated patient about benefits of medication and our goal is to discharge him but if he keeps refusing we will not be able to get to that goal.    He agrees to take medication from panic attacks and anxiety.  Prescribed Ativan 1 mg p.o. as needed every 4 hours for anxiety and also Ativan 2 mg IM for severe agitation without psychosis.    Patient is Zyprexa 50mg  at bedtime encouraged to take.  He reports insomnia and educated patient that he will sleep better with Zyprexa.    Provider returning as needed for insomnia.    Zyprexa and Ativan should not be used within an hour

## 2025-02-08 NOTE — BH INPATIENT PSYCHIATRY ASSESSMENT NOTE - NSBHCHARTREVIEWVS_PSY_A_CORE FT
Vital Signs Last 24 Hrs  T(C): 37.1 (02-07-25 @ 20:36), Max: 37.1 (02-07-25 @ 20:36)  T(F): 98.7 (02-07-25 @ 20:36), Max: 98.7 (02-07-25 @ 20:36)  HR: 59 (02-07-25 @ 18:30) (59 - 63)  BP: 119/66 (02-07-25 @ 18:30) (119/66 - 125/78)  BP(mean): 78 (02-07-25 @ 18:30) (78 - 93)  RR: 17 (02-07-25 @ 20:36) (17 - 19)  SpO2: 100% (02-07-25 @ 20:36) (100% - 100%)    Orthostatic VS  02-07-25 @ 20:36  Lying BP: --/-- HR: --  Sitting BP: 127/69 HR: 74  Standing BP: 114/79 HR: 93  Site: --  Mode: --

## 2025-02-08 NOTE — BH INPATIENT PSYCHIATRY ASSESSMENT NOTE - HPI (INCLUDE ILLNESS QUALITY, SEVERITY, DURATION, TIMING, CONTEXT, MODIFYING FACTORS, ASSOCIATED SIGNS AND SYMPTOMS)
This is 29 years old white man who was admitted July for psychotic outbreak, paranoid, internally preoccupied, has urges to throw objects either his father's or his arm belongings to the garbage which leads to distress and family.    Patient describes panic attacks when he had the sudden next set of attacks of overwhelming anxiety sweating chest pain.    He also said that he gets labile, he starts crying he lays down the floor he cannot tolerate panic attacks.  The same time he says he does not want a medication.      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    Pt consistently reports that for the past week, he has been clearing his home extensively of his and his father's belongings including things like his car keys and various electronics. The patient has been behaving oddly in the ED, perseverating on taking a shower, somewhat psychomotor agitated but able to be redirected. He makes vague comments about having conflicts with his father and says he has "thoughts of harming my father" because he has recently been more irritated by him, but he does not provide any specifics. He says his father has an order of protection against him not for physical violence but due to patient throwing out his father's belongings. Pt says this morning he "freaked out," felt "out of wack." When asked more about this, states he feels like he is "falling" mentally. Pt is requesting psychiatric hospitalization without clear reasoning.    Pt's speech is rapid, flight of ideas.

## 2025-02-08 NOTE — BH INPATIENT PSYCHIATRY ASSESSMENT NOTE - NSBHCHARTREVIEWLAB_PSY_A_CORE FT
14.0   5.31  )-----------( 219      ( 07 Feb 2025 12:31 )             40.8   02-07    136  |  105  |  23  ----------------------------<  108[H]  3.8   |  27  |  1.41[H]    Ca    9.8      07 Feb 2025 12:31    TPro  6.9  /  Alb  4.3  /  TBili  0.4  /  DBili  x   /  AST  26  /  ALT  38  /  AlkPhos  40  02-07

## 2025-02-08 NOTE — H&P ADULT - HISTORY OF PRESENT ILLNESS
"Patient is a 29-year-old male with history of ADHD not currently on any medications who reports that he decided to throw away multiple items on private property including TV and electric heater and important papers.  Patient proceeded to then have panic attack and called ambulance.  Patient denies any SI HI reports that he lives with his father who does not know he is in the emergency department at this time.  Patient reports that his father has a restraining order against him and patient has had similar incident  in the past where he had a panic attack and had thrown out property.  Patient reports that he would like to meet with someone so that he does not "throw out private property again.""    28 yo M with pmh ?bipolar disorder admitted under psychiatry for flight of idea, pressured speech and anxiety. 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 PT now resides with father and was found throwing out fathers personal belongings after he himself called EMS due to a panic attack. Father holds a restraining order against patient. Has prior psychiatric hospitalizations.   Denies thoughts of self harm but has fleeting thoughts of harming his father but has not acted out on it. He denies suicidal ideations. Pt admitted due to psychosis and potential threat to others      EKG: NSR, flat T waves in V1-V2, not acute stt changes, QTc: 418        PMH: ?bipolar disorder  Social: none known. Graduate from law school, unemployed  Fhx: none known

## 2025-02-08 NOTE — BH SOCIAL WORK INITIAL PSYCHOSOCIAL EVALUATION - NSHIGHRISKBEH_PSY_ALL_CORE
Sunscreen Recommendations: Discussed importance of regular photo protection with sunscreen and photo protective clothing.
Nicotinamide Supplementation Recommendations: Discussed with patient the use of nicotinamide 250 mg one pill twice daily
Detail Level: Zone
Yes
Detail Level: Detailed
Sunscreen Recommendations: Discussed continual use of sun protection with sun screen with SPF 30 and photo protective clothing.

## 2025-02-08 NOTE — BH INPATIENT PSYCHIATRY ASSESSMENT NOTE - NSBHMETABOLIC_PSY_ALL_CORE_FT
BMI: BMI (kg/m2): 22.1 (02-07-25 @ 20:36)  HbA1c:   Glucose:   BP: 119/66 (02-07-25 @ 18:30) (119/66 - 129/72)Vital Signs Last 24 Hrs  T(C): 37.1 (02-07-25 @ 20:36), Max: 37.1 (02-07-25 @ 20:36)  T(F): 98.7 (02-07-25 @ 20:36), Max: 98.7 (02-07-25 @ 20:36)  HR: 59 (02-07-25 @ 18:30) (59 - 63)  BP: 119/66 (02-07-25 @ 18:30) (119/66 - 125/78)  BP(mean): 78 (02-07-25 @ 18:30) (78 - 93)  RR: 17 (02-07-25 @ 20:36) (17 - 19)  SpO2: 100% (02-07-25 @ 20:36) (100% - 100%)    Orthostatic VS  02-07-25 @ 20:36  Lying BP: --/-- HR: --  Sitting BP: 127/69 HR: 74  Standing BP: 114/79 HR: 93  Site: --  Mode: --    Lipid Panel:

## 2025-02-08 NOTE — H&P ADULT - NSHPLABSRESULTS_GEN_ALL_CORE
LABS: All Labs Reviewed:                        14.0   5.31  )-----------( 219      ( 07 Feb 2025 12:31 )             40.8     02-07    136  |  105  |  23  ----------------------------<  108[H]  3.8   |  27  |  1.41[H]    Ca    9.8      07 Feb 2025 12:31    TPro  6.9  /  Alb  4.3  /  TBili  0.4  /  DBili  x   /  AST  26  /  ALT  38  /  AlkPhos  40  02-07              Blood Culture:

## 2025-02-08 NOTE — BH INPATIENT PSYCHIATRY ASSESSMENT NOTE - NSDCCRITERIA_PSY_ALL_CORE
Increased acute risk because patient has suicidal thoughts however no plan or intent.  He knows how to communicate that to nurses and he is able to say that he will utilize that if thoughts become too intensive.    Increased long-term risk considering the fact that he is noncompliant and has bipolar disorder.    Protective factors: Supportive family    Mitigation of risk: Inpatient level of care, medication, safety plan.

## 2025-02-08 NOTE — H&P ADULT - NSHPPHYSICALEXAM_GEN_ALL_CORE
ICU Vital Signs Last 24 Hrs  T(C): 37.1 (07 Feb 2025 20:36), Max: 37.1 (07 Feb 2025 20:36)  T(F): 98.7 (07 Feb 2025 20:36), Max: 98.7 (07 Feb 2025 20:36)  HR: 59 (07 Feb 2025 18:30) (59 - 66)  BP: 119/66 (07 Feb 2025 18:30) (119/66 - 129/72)  BP(mean): 78 (07 Feb 2025 18:30) (78 - 93)  ABP: --  ABP(mean): --  RR: 17 (07 Feb 2025 20:36) (17 - 19)  SpO2: 100% (07 Feb 2025 20:36) (100% - 100%)    O2 Parameters below as of 07 Feb 2025 20:36  Patient On (Oxygen Delivery Method): room air            PHYSICAL EXAM:    Constitutional: NAD, awake and alert  HEENT: PERR, EOMI, Normal Hearing, MMM  Neck: Soft and supple, No LAD, No JVD  Respiratory: Breath sounds are clear bilaterally, No wheezing, rales or rhonchi  Cardiovascular: S1 and S2, regular rate and rhythm, no Murmurs, gallops or rubs  Gastrointestinal: Bowel Sounds present, soft, nontender, nondistended, no guarding, no rebound  Extremities: No peripheral edema  Vascular: 2+ peripheral pulses  Neurological: A/O x 3, no focal deficits  Musculoskeletal: 5/5 strength b/l upper and lower extremities  Skin: No rashes

## 2025-02-08 NOTE — BH INPATIENT PSYCHIATRY ASSESSMENT NOTE - NSBHCHARTREVIEWINVESTIGATE_PSY_A_CORE FT
Ventricular Rate 61 BPM    Atrial Rate 61 BPM    P-R Interval 152 ms    QRS Duration 80 ms    Q-T Interval 416 ms    QTC Calculation(Bazett) 418 ms    P Axis 55 degrees    R Axis 77 degrees    T Axis 72 degrees    Diagnosis Line Normal sinus rhythm  Normal ECG  When compared with ECG of 03-NOV-2017 16:17,  No significant change was found  Confirmed by ELIAS ROMANO ([id number]) on 2/7/2025 1:09:25 PM

## 2025-02-08 NOTE — BH SOCIAL WORK INITIAL PSYCHOSOCIAL EVALUATION - NSHIGHRISKBEHFT_PSY_ALL_CORE
Not in treatment, PPHx unknown dx, non-compliance with treatment, family dynamic issues-Legal concerns with father, recent loss of employment

## 2025-02-08 NOTE — BH SOCIAL WORK INITIAL PSYCHOSOCIAL EVALUATION - OTHER PAST PSYCHIATRIC HISTORY (INCLUDE DETAILS REGARDING ONSET, COURSE OF ILLNESS, INPATIENT/OUTPATIENT TREATMENT)
28 yo Male presents with acute anxiety and bizarre behavior, no outpt Tx. Presents with rapid speech, flight of ideas and intense eye contact.    Pt is a 28 yo Single, Male, unemployed living with father in private residence ([address] [place] [address]). 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 Pt reports no actual Psych Dx was seeing private outpt Psych MD/and on meds. Pt reports one prior psychiatric hospitalization 4 months ago at [facility], currently not getting any treatment Pt did not like the MD he was seeing. Denies current substance/alcohol use has hx cocaine use. Denies childhood trauma/abuse, denies SI/HI, denies A/VH.    **  Pt Father has Order of Protection from Pt for "throwing out his belongings" Pt reports he can return home to father's house but does not want our staff to contact father or notify father of Pts admission. Pt did report "thoughts of harming my father" as he has become more irritated by him, Pt he does not provide any specifics or plans to harm father.    Pt reports his uncle can p/u when d/c'd and bring home to father's house...Sw to further discuss with Pt.     Pharm- Jesus Azul    [phone]

## 2025-02-08 NOTE — H&P ADULT - ASSESSMENT
"Patient is a 29-year-old male with history of ADHD not currently on any medications who reports that he decided to throw away multiple items on private property including TV and electric heater and important papers.  Patient proceeded to then have panic attack and called ambulance.  Patient denies any SI HI reports that he lives with his father who does not know he is in the emergency department at this time.  Patient reports that his father has a restraining order against him and patient has had similar incident  in the past where he had a panic attack and had thrown out property.  Patient reports that he would like to meet with someone so that he does not "throw out private property again.""    30 yo M with pmh ?bipolar disorder admitted under psychiatry for flight of idea, pressured speech and anxiety. 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 PT now resides with father and was found throwing out fathers personal belongings after he himself called EMS due to a panic attack. Father holds a restraining order against patient. Has prior psychiatric hospitalizations.   Denies thoughts of self harm but has fleeting thoughts of harming his father but has not acted out on it. He denies suicidal ideations. Pt admitted due to psychosis and potential threat to others      Psychosis  - admitted to [facility]  - No acute homicidal or suicidal ideations  - behavioral therapy et al per primary team      #Elevated Serum creatinine / BULMARO  - Scr : 1.4, prior 1.1 in 2017  - unknown if this is current baseline  - encourage po intake water  - UA negative  - avoid nephrotoxins  - recheck Scr in am  - urine studies  - if not improvement on repeat, or trending up, obtain renal sono      DVT px: Ambulate      Will continue to follow

## 2025-02-08 NOTE — BH INPATIENT PSYCHIATRY ASSESSMENT NOTE - NSBHMSELANG_PSY_A_CORE
Assisted Kari NEGRON (Wound RN), with wound care, non-selective debridement performed using wound cleanser/NS and gauze. Please see Kari NEGRON (Wound RN) wound note for further wound care details.     No abnormalities noted

## 2025-02-08 NOTE — BH INPATIENT PSYCHIATRY ASSESSMENT NOTE - CURRENT MEDICATION
MEDICATIONS  (STANDING):  escitalopram 5 milliGRAM(s) Oral daily  melatonin 5 milliGRAM(s) Oral at bedtime  OLANZapine 5 milliGRAM(s) Oral at bedtime    MEDICATIONS  (PRN):  LORazepam     Tablet 1 milliGRAM(s) Oral every 4 hours PRN Anxiety  LORazepam   Injectable 2 milliGRAM(s) IntraMuscular every 6 hours PRN severe agitation without psychosis  OLANZapine 2.5 milliGRAM(s) Oral every 6 hours PRN moderate psychotic agitation  OLANZapine Injectable 5 milliGRAM(s) IntraMuscular every 6 hours PRN severe psychotic agitation

## 2025-02-08 NOTE — BH SOCIAL WORK INITIAL PSYCHOSOCIAL EVALUATION - NSBHCHILDEVENTS_PSY_ALL_CORE
Pt reports good childhood, no details- has 2 younger sisters living in [place]. Pt graduated from [employer] denies concerns from childhood.

## 2025-02-09 LAB
ANION GAP SERPL CALC-SCNC: 4 MMOL/L — LOW (ref 5–17)
BUN SERPL-MCNC: 27 MG/DL — HIGH (ref 7–23)
CALCIUM SERPL-MCNC: 9.3 MG/DL — SIGNIFICANT CHANGE UP (ref 8.5–10.1)
CHLORIDE SERPL-SCNC: 112 MMOL/L — HIGH (ref 96–108)
CO2 SERPL-SCNC: 26 MMOL/L — SIGNIFICANT CHANGE UP (ref 22–31)
CREAT SERPL-MCNC: 1.4 MG/DL — HIGH (ref 0.5–1.3)
EGFR: 69 ML/MIN/1.73M2 — SIGNIFICANT CHANGE UP
GLUCOSE SERPL-MCNC: 78 MG/DL — SIGNIFICANT CHANGE UP (ref 70–99)
POTASSIUM SERPL-MCNC: 4 MMOL/L — SIGNIFICANT CHANGE UP (ref 3.5–5.3)
POTASSIUM SERPL-SCNC: 4 MMOL/L — SIGNIFICANT CHANGE UP (ref 3.5–5.3)
SODIUM SERPL-SCNC: 142 MMOL/L — SIGNIFICANT CHANGE UP (ref 135–145)

## 2025-02-09 PROCEDURE — 76770 US EXAM ABDO BACK WALL COMP: CPT | Mod: 26

## 2025-02-09 PROCEDURE — 99232 SBSQ HOSP IP/OBS MODERATE 35: CPT

## 2025-02-09 RX ADMIN — OLANZAPINE 5 MILLIGRAM(S): 10 TABLET ORAL at 21:13

## 2025-02-09 RX ADMIN — ESCITALOPRAM OXALATE 5 MILLIGRAM(S): 20 TABLET ORAL at 09:49

## 2025-02-09 RX ADMIN — Medication 1 MILLIGRAM(S): at 09:49

## 2025-02-09 RX ADMIN — Medication 1 MILLIGRAM(S): at 21:13

## 2025-02-10 LAB
A1C WITH ESTIMATED AVERAGE GLUCOSE RESULT: 5.2 % — SIGNIFICANT CHANGE UP (ref 4–5.6)
ANION GAP SERPL CALC-SCNC: 4 MMOL/L — LOW (ref 5–17)
BUN SERPL-MCNC: 29 MG/DL — HIGH (ref 7–23)
CALCIUM SERPL-MCNC: 9.7 MG/DL — SIGNIFICANT CHANGE UP (ref 8.5–10.1)
CHLORIDE SERPL-SCNC: 110 MMOL/L — HIGH (ref 96–108)
CK SERPL-CCNC: 222 U/L — SIGNIFICANT CHANGE UP (ref 26–308)
CO2 SERPL-SCNC: 25 MMOL/L — SIGNIFICANT CHANGE UP (ref 22–31)
CREAT SERPL-MCNC: 1.27 MG/DL — SIGNIFICANT CHANGE UP (ref 0.5–1.3)
EGFR: 78 ML/MIN/1.73M2 — SIGNIFICANT CHANGE UP
ESTIMATED AVERAGE GLUCOSE: 103 MG/DL — SIGNIFICANT CHANGE UP (ref 68–114)
GLUCOSE SERPL-MCNC: 95 MG/DL — SIGNIFICANT CHANGE UP (ref 70–99)
POTASSIUM SERPL-MCNC: 3.9 MMOL/L — SIGNIFICANT CHANGE UP (ref 3.5–5.3)
POTASSIUM SERPL-SCNC: 3.9 MMOL/L — SIGNIFICANT CHANGE UP (ref 3.5–5.3)
SODIUM SERPL-SCNC: 139 MMOL/L — SIGNIFICANT CHANGE UP (ref 135–145)

## 2025-02-10 PROCEDURE — 99232 SBSQ HOSP IP/OBS MODERATE 35: CPT

## 2025-02-10 RX ORDER — TRAZODONE HCL 100 MG
50 TABLET ORAL AT BEDTIME
Refills: 0 | Status: DISCONTINUED | OUTPATIENT
Start: 2025-02-10 | End: 2025-02-18

## 2025-02-10 RX ORDER — ESCITALOPRAM OXALATE 20 MG/1
10 TABLET ORAL DAILY
Refills: 0 | Status: DISCONTINUED | OUTPATIENT
Start: 2025-02-11 | End: 2025-02-18

## 2025-02-10 RX ADMIN — ESCITALOPRAM OXALATE 5 MILLIGRAM(S): 20 TABLET ORAL at 09:05

## 2025-02-10 NOTE — BH INPATIENT PSYCHIATRY PROGRESS NOTE - CURRENT MEDICATION
MEDICATIONS  (STANDING):  melatonin 5 milliGRAM(s) Oral at bedtime    MEDICATIONS  (PRN):  LORazepam     Tablet 1 milliGRAM(s) Oral every 4 hours PRN Anxiety  LORazepam   Injectable 2 milliGRAM(s) IntraMuscular every 6 hours PRN severe agitation without psychosis  OLANZapine 2.5 milliGRAM(s) Oral every 6 hours PRN moderate psychotic agitation  OLANZapine Injectable 5 milliGRAM(s) IntraMuscular every 6 hours PRN severe psychotic agitation  traZODone 50 milliGRAM(s) Oral at bedtime PRN insomnia

## 2025-02-11 PROCEDURE — 99232 SBSQ HOSP IP/OBS MODERATE 35: CPT

## 2025-02-11 RX ADMIN — ESCITALOPRAM OXALATE 10 MILLIGRAM(S): 20 TABLET ORAL at 09:40

## 2025-02-11 NOTE — BH INPATIENT PSYCHIATRY PROGRESS NOTE - CURRENT MEDICATION
MEDICATIONS  (STANDING):  escitalopram 10 milliGRAM(s) Oral daily  melatonin 5 milliGRAM(s) Oral at bedtime    MEDICATIONS  (PRN):  LORazepam     Tablet 1 milliGRAM(s) Oral every 4 hours PRN Anxiety  LORazepam   Injectable 2 milliGRAM(s) IntraMuscular every 6 hours PRN severe agitation without psychosis  OLANZapine 2.5 milliGRAM(s) Oral every 6 hours PRN moderate psychotic agitation  OLANZapine Injectable 5 milliGRAM(s) IntraMuscular every 6 hours PRN severe psychotic agitation  traZODone 50 milliGRAM(s) Oral at bedtime PRN insomnia

## 2025-02-11 NOTE — CONSULT NOTE ADULT - ASSESSMENT
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 Medicine was consulted for abnormal serum Cr, 1.4. Unclear baseline. Last Cr was 1.1 in 2017.     BULMARO vs normal baseline Cr for patient  CrCl 74. Serum Cr here 1.4. Unclear baseline. Last Cr was 1.1 in 2017. Repeat Cr today 1.27. UA unremarkable. No voiding complaints. No recent NSAID use. No contrast enhanced imaging studies. No hypotension. No nephrotoxic agents. Renal bladder US with 3 mm right renal calculus without associated hydronephrosis. Continue to encourage hydration.       Will sign off.     
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 Medicine was consulted for abnormal serum Cr, 1.4. Unclear baseline. Last Cr was 1.1 in 2017.     BULMARO vs normal baseline Cr for patient  CrCl 74. Serum Cr here 1.4. Unclear baseline. Last Cr was 1.1 in 2017. Repeat Cr today 1.4. UA unremarkable. No voiding complaints. No recent NSAID use. No contrast enhanced imaging studies. No hypotension. No nephrotoxic agents.   - Obtain bladder scan post void to assess residual volume (ordered)  - Collect urine for urine sodium, urine creatinine, urine protein/creatinine ratio (ordered)  - Renal bladder ultrasound (ordered)  - Check CPK, Hgb A1c (ordered)      Will continue to follow  
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 Medicine was consulted for abnormal serum Cr, 1.4. Unclear baseline. Last Cr was 1.1 in 2017.     BULMARO vs normal baseline Cr for patient  CrCl 74. Serum Cr here 1.4. Unclear baseline. Last Cr was 1.1 in 2017. Repeat Cr today 1.27. UA unremarkable. No voiding complaints. No recent NSAID use. No contrast enhanced imaging studies. No hypotension. No nephrotoxic agents. Renal bladder US with 3 mm right renal calculus without associated hydronephrosis. Continue to encourage hydration. Will sign off.

## 2025-02-11 NOTE — CONSULT NOTE ADULT - SUBJECTIVE AND OBJECTIVE BOX
Chief Complaint: Panic, psychiatric decompensation    Interval Hx: Patient seen and examined. No new complaints. Says he feels better than presentation but appears anxious, preoccupied. Continues having episodes of staring and looks distracted. Cr is improved today as compared to admission, 1.27 today.     ROS: Multi system review is somewhat limited due to anxiousness, staring episodes, distraction    Vitals:  T(F): 97.8 (10 Feb 2025 07:32), Max: 97.8 (10 Feb 2025 07:32)  RR: 18 (10 Feb 2025 07:32) (18 - 18)  SpO2: 100% (10 Feb 2025 07:32) (100% - 100%) on room air    Exam:  Gen: No acute distress  HEENT: NCAT PERRL EOMI MMM clear oropharynx  Neck: SUpple  CVS: s1 s2 normal, regular, rate normal  Chest: Normal resp effort, clear lungs  Abd: Non distended, +BS, no tenderness  Ext: No edema, normal cap refill  Skin: Warm, dry  Mood: Anxious  Neuro: Awake and alert, answers simple questions, follows simple commands    Labs:              14.0  5.31 )--------( 219            40.8      139  |  110  |  29  ------------------------<  95  3.9   |  25  |  1.27    Ca    9.7          Alcohol < 10   Acetaminophen 20   Salicylate <1.7     Utox negative     UA pH WNL, yellow, clear, glu-, ket-, bld-, prot-, N-, LE-     Micro:  COVID19, flu, RSV PCR 2/7: Negative    Imaging:  None    Cardiac Testing:  EKG 2/7: NSR, rate normal    Meds:  MEDICATIONS  (STANDING):  melatonin 5 milliGRAM(s) Oral at bedtime    MEDICATIONS  (PRN):  LORazepam     Tablet 1 milliGRAM(s) Oral every 4 hours PRN Anxiety  LORazepam   Injectable 2 milliGRAM(s) IntraMuscular every 6 hours PRN severe agitation without psychosis  OLANZapine 2.5 milliGRAM(s) Oral every 6 hours PRN moderate psychotic agitation  OLANZapine Injectable 5 milliGRAM(s) IntraMuscular every 6 hours PRN severe psychotic agitation  traZODone 50 milliGRAM(s) Oral at bedtime PRN insomnia                    
Chief Complaint: Panic, psychiatric decompensation    Interval Hx: Patient seen and examined. No new complaints.     ROS: Multi system review is somewhat limited due to anxiousness, staring episodes, distraction    Vitals:  T(F): 97.6 (09 Feb 2025 07:43), Max: 97.6 (09 Feb 2025 07:43)  HR and BP not recorded  RR: 18 (09 Feb 2025 07:43) (18 - 18)  SpO2: 98% (09 Feb 2025 07:43) (98% - 98%) on room air    Exam:  Gen: No acute distress  HEENT: NCAT PERRL EOMI MMM clear oropharynx  Neck: SUpple  CVS: s1 s2 normal, regular, rate normal  Chest: Normal resp effort, clear lungs  Abd: Non distended, +BS, no tenderness  Ext: No edema, normal cap refill  Skin: Warm, dry  Mood: Anxious  Neuro: Awake and alert, answers simple questions, follows simple commands    Labs:              14.0  5.31 )--------( 219            40.8      142  |  112  |  27  -----------------------<  78  4.0   |  26  |  1.40    Ca 9.3        Alcohol < 10   Acetaminophen 20   Salicylate <1.7     Utox negative     UA pH WNL, yellow, clear, glu-, ket-, bld-, prot-, N-, LE-     Micro:  COVID19, flu, RSV PCR 2/7: Negative    Imaging:  None    Cardiac Testing:  EKG 2/7: NSR, rate normal    Meds:  MEDICATIONS  (STANDING):  escitalopram 5 milliGRAM(s) Oral daily  melatonin 5 milliGRAM(s) Oral at bedtime  OLANZapine 5 milliGRAM(s) Oral at bedtime    MEDICATIONS  (PRN):  LORazepam     Tablet 1 milliGRAM(s) Oral every 4 hours PRN Anxiety  LORazepam   Injectable 2 milliGRAM(s) IntraMuscular every 6 hours PRN severe agitation without psychosis  OLANZapine 2.5 milliGRAM(s) Oral every 6 hours PRN moderate psychotic agitation  OLANZapine Injectable 5 milliGRAM(s) IntraMuscular every 6 hours PRN severe psychotic agitation                            
Chief Complaint: Panic, psychiatric decompensation    Interval Hx: Patient seen and examined. No new complaints. Says he feels better than presentation but appears anxious, preoccupied. Continues having episodes of staring and looks distracted. Cr is improved today as compared to admission, 1.27 today.     ROS: Multi system review is somewhat limited due to anxiousness, staring episodes, distraction    Vitals:  T(F): 97.8 (10 Feb 2025 07:32), Max: 97.8 (10 Feb 2025 07:32)  RR: 18 (10 Feb 2025 07:32) (18 - 18)  SpO2: 100% (10 Feb 2025 07:32) (100% - 100%) on room air    Exam:  Gen: No acute distress  HEENT: NCAT PERRL EOMI MMM clear oropharynx  Neck: SUpple  CVS: s1 s2 normal, regular, rate normal  Chest: Normal resp effort, clear lungs  Abd: Non distended, +BS, no tenderness  Ext: No edema, normal cap refill  Skin: Warm, dry  Mood: Anxious  Neuro: Awake and alert, answers simple questions, follows simple commands    Labs:              14.0  5.31 )--------( 219            40.8      139  |  110  |  29  ------------------------<  95  3.9   |  25  |  1.27    Ca    9.7          Alcohol < 10   Acetaminophen 20   Salicylate <1.7     Utox negative     UA pH WNL, yellow, clear, glu-, ket-, bld-, prot-, N-, LE-     Micro:  COVID19, flu, RSV PCR 2/7: Negative    Imaging:  None    Cardiac Testing:  EKG 2/7: NSR, rate normal    Meds:  MEDICATIONS  (STANDING):  melatonin 5 milliGRAM(s) Oral at bedtime    MEDICATIONS  (PRN):  LORazepam     Tablet 1 milliGRAM(s) Oral every 4 hours PRN Anxiety  LORazepam   Injectable 2 milliGRAM(s) IntraMuscular every 6 hours PRN severe agitation without psychosis  OLANZapine 2.5 milliGRAM(s) Oral every 6 hours PRN moderate psychotic agitation  OLANZapine Injectable 5 milliGRAM(s) IntraMuscular every 6 hours PRN severe psychotic agitation  traZODone 50 milliGRAM(s) Oral at bedtime PRN insomnia

## 2025-02-12 RX ADMIN — ESCITALOPRAM OXALATE 10 MILLIGRAM(S): 20 TABLET ORAL at 10:16

## 2025-02-13 RX ADMIN — Medication 5 MILLIGRAM(S): at 20:44

## 2025-02-13 RX ADMIN — ESCITALOPRAM OXALATE 10 MILLIGRAM(S): 20 TABLET ORAL at 11:06

## 2025-02-14 RX ORDER — OLANZAPINE 10 MG/1
2.5 TABLET ORAL AT BEDTIME
Refills: 0 | Status: DISCONTINUED | OUTPATIENT
Start: 2025-02-14 | End: 2025-02-18

## 2025-02-14 RX ADMIN — ESCITALOPRAM OXALATE 10 MILLIGRAM(S): 20 TABLET ORAL at 09:45

## 2025-02-14 NOTE — BH INPATIENT PSYCHIATRY PROGRESS NOTE - CURRENT MEDICATION
MEDICATIONS  (STANDING):  escitalopram 10 milliGRAM(s) Oral daily  melatonin 5 milliGRAM(s) Oral at bedtime  OLANZapine 2.5 milliGRAM(s) Oral at bedtime    MEDICATIONS  (PRN):  LORazepam     Tablet 1 milliGRAM(s) Oral every 4 hours PRN Anxiety  LORazepam   Injectable 2 milliGRAM(s) IntraMuscular every 6 hours PRN severe agitation without psychosis  OLANZapine 2.5 milliGRAM(s) Oral every 6 hours PRN moderate psychotic agitation  OLANZapine Injectable 5 milliGRAM(s) IntraMuscular every 6 hours PRN severe psychotic agitation  traZODone 50 milliGRAM(s) Oral at bedtime PRN insomnia

## 2025-02-14 NOTE — BH TREATMENT PLAN - NSTXPLANTHERAPYSESSIONSFT_PSY_ALL_CORE
02-10-25  Type of therapy: Symptom management  Type of session: Group  Level of patient participation: Not engaged  Duration of participation: 15 minutes  Therapy conducted by: Psych rehab  Therapy Summary: Patient walked out of group therapy early. Withdrawn. Education and support ongoing.    02-11-25  Type of therapy: Coping skills  Type of session: Group  Level of patient participation: Quiet  Duration of participation: 15 minutes  Therapy conducted by: Psych rehab  Therapy Summary: Pt briefly attended therapy group on coping skills. He was able to identify some methods of coping and shared with the group. Was quiet and withdrawn most of the day.  
  02-10-25  Type of therapy: Symptom management  Type of session: Group  Level of patient participation: Not engaged  Duration of participation: 15 minutes  Therapy conducted by: Psych rehab  Therapy Summary: Patient walked out of group therapy early. Withdrawn. Education and support ongoing.    02-11-25  Type of therapy: Coping skills  Type of session: Group  Level of patient participation: Quiet  Duration of participation: 15 minutes  Therapy conducted by: Psych rehab  Therapy Summary: Pt briefly attended therapy group on coping skills. He was able to identify some methods of coping and shared with the group. Was quiet and withdrawn most of the day.

## 2025-02-14 NOTE — BH TREATMENT PLAN - ANXIETY/PANIC/FEAR NURSING INTERVENTIONS/RECOMMENDATIONS
Acknowledge patient feelings neida they are experiencing.    Identify resources the patient can use at home, in the future, along with a plan to follow for breakthrough episodes of anxiety.     Educate patient on new coping mechanisms or previously used ones that were effective for the patient.
Acknowledge patient feelings neida they are experiencing.    Identify resources the patient can use at home, in the future, along with a plan to follow for breakthrough episodes of anxiety.     Educate patient on new coping mechanisms or previously used ones that were effective for the patient.

## 2025-02-14 NOTE — BH SAFETY PLAN - SUICIDE PREVENTION LIFELINE PHONES
The following were also addressed at this visit:  A detailed physical exam was also done as part of this visit in addition to a Medicare wellness visit     MEDICARE WELLNESS VISIT NOTE    HISTORY OF PRESENT ILLNESS:   Merari Summers presents for her Subsequent Annual Medicare Wellness Visit.   She has complaints or concerns which include hypertension, atrial fibrillation, hyperlipidemia, [employer] pacemaker, pre diabetes,.  COPD, DDD lumbosacral, stroke with cerebral ischemia     Patient Care Team:  Magi Walker MD as PCP - General (Internal Medicine)  Sherrill Chase DO (Neurology)  Dashawn Marquez MD (Dermatology)  Danielle Negron MD (Electrophysiology)  Darek Sandra MD (Ophthalmology)  Ezekiel Pillai MD as Orthopedic Surgeon (Orthopedic Surgery)        Patient Active Problem List   Diagnosis   • Other malignant neoplasm of skin of trunk, except scrotum   • Unspecified glaucoma   • Cystocele, midline   • Rectocele   • Other and unspecified hyperlipidemia   • Chondrocalcinosis, cause unspecified, involving hand(712.34)   • OSTEOPENIA   • Stroke with cerebral ischemia (CMS/HCC)   • Atrial fibrillation (CMS/HCC)   • Hx of nonmelanoma skin cancer   • Anticoagulant long-term use   • Visit for monitoring Tikosyn therapy   • Gout   • Lumbosacral spondylosis without myelopathy   • COPD, severe (CMS/HCC)   • Hx of skin malignancy   • REFUSES ANY ADDITIONAL COLON CANCER SCREENING   • [employer] MRI single pacemaker, 1/28/2020   • CHB s/p AVJ ablation, 1/28/2020   • Ascending aortic aneurysm (CMS/HCC)   • Personal history of tobacco use   • Essential hypertension   • Prediabetes   • Obesity (BMI 30-39.9)         Past Medical History:   Diagnosis Date   • Allergic rhinitis due to pollen    • Ascending aortic aneurysm (CMS/HCC) 2/4/2020   • Atrial fibrillation (CMS/HCC) 6/10/2013    paroxysmal A fib   • CHONDROCALCIN NOS-HAND 7/12/2007   • Cystocele, midline 8/15/2005   • Diverticulitis of colon (without  mention of hemorrhage)(426.47) 8-96   • Gout    • Impaired fasting glucose    • Lumbago    • Obesity, unspecified    • OSTEOPENIA    • Other and unspecified hyperlipidemia    • Other malignant neoplasm of skin of trunk, except scrotum     L. anterior shoulder   • OVERACTIVE BLADDER    • Pain in limb 2008   • Plantar fascial fibromatosis    • Rectocele 8/15/2005   • REFUSES ANY ADDITIONAL COLON CANCER SCREENING    • Renal failure     Mild creat 1.2   • SCC (squamous cell carcinoma)     left tip of nose   • Stroke with cerebral ischemia (CMS/HCC) 2010   • Symptomatic menopausal or female climacteric states     Menopausal Syndrome   • Unspecified essential hypertension    • Unspecified glaucoma(032.2) 3-98   • Unspecified hemorrhoids without mention of complication    • Varicella without mention of complication          Past Surgical History:   Procedure Laterality Date   • Ablation av node - cv  2020   • Appendectomy     • Biopsy of uterus lining  99    Dr. Johnson/ WNL   • Colonoscopy diagnostic  2001    Repeat 2006   • Colonoscopy w biopsy  09    Dr. Crawford, Diverticulosis/Polyps, F/U 5 years   • Flexible sigmoidoscopy diagnostic include specimens  10/11/90    hemorrhoid   • Ppm single implant  2020         Social History     Tobacco Use   • Smoking status: Former Smoker     Packs/day: 1.00     Years: 35.00     Pack years: 35.00     Quit date: 1988     Years since quittin.9   • Smokeless tobacco: Never Used   • Tobacco comment: started at 16   Vaping Use   • Vaping Use: never used   Substance Use Topics   • Alcohol use: Yes     Comment: maybe one per week   • Drug use: No     Drug use:    Drug Use:    No              Family History   Problem Relation Age of Onset   • Cancer Sister         breast   • Cancer Brother        Current Outpatient Medications   Medication Sig Dispense Refill   • nitrofurantoin, macrocrystal-monohydrate, (Macrobid) 100 MG capsule Take 1  capsule by mouth 2 times daily. 10 capsule 0   • atorvastatin (LIPITOR) 40 MG tablet TAKE 1 TABLET BY MOUTH EVERY DAY 90 tablet 3   • lisinopril (ZESTRIL) 40 MG tablet TAKE 1 TABLET BY MOUTH EVERY DAY 90 tablet 3   • TRAVOPROST OP 1 drop in each eye daily.     • hydrALAZINE (APRESOLINE) 25 MG tablet TAKE 1 TABLET BY MOUTH THREE TIMES A  tablet 3   • amLODIPine (NORVASC) 2.5 MG tablet Take 1 tablet by mouth daily. 90 tablet 3   • Eliquis 5 MG Tab TAKE 1 TABLET BY MOUTH EVERY 12 HOURS 180 tablet 3   • umeclidinium-vilanterol (Anoro Ellipta) 62.5-25 MCG/INH inhaler Inhale 1 puff into the lungs daily. 1 each 11   • Multiple Vitamins-Minerals (MULTIVITAMIN ADULTS) Tab Take 1 tablet by mouth daily.     • calcium carbonate-vitamin D (CALTRATE 600+D) 600-400 MG-UNIT per tablet Take 1 tablet by mouth daily.     • Cholecalciferol (VITAMIN D-3) 1000 units Cap Take 1 capsule by mouth daily.       No current facility-administered medications for this visit.        The following items on the Medicare Health Risk Assessment were found to be positive            Vision and Hearing screens: No exam data present    Advance Directive:   The patient has the following documents:  Power of  for Health Care    Cognitive/Functional Status: no evidence of cognitive dysfunction by direct observation    Opioid Review: Merari is not taking opioid medications.    Recent PHQ 2/9 Score:    PHQ 2:  Date Adult PHQ 2 Score Adult PHQ 2 Interpretation   7/7/2021 0 No further screening needed       PHQ 9:       DEPRESSION ASSESSMENT/PLAN:  Depression screening is negative no further plan needed.     There is no height or weight on file to calculate BMI.    BMI ASSESSMENT/PLAN:  Patient BMI is within normal range.     Needed Screening/Treatment:   Cardiovascular screening - Lipids   Needed follow up:  None    See orders.   See Patient Instructions section.   Return in about 1 year (around 12/15/2022) for Medicare Wellness Visit.      OBJECTIVE:  The patient is a 85 year old  female who appears healthy,alert,in no distress.  BUILD:  Body mass index is 31.03 kg/m².   NECK: Neck supple. No masses. No adenopathy. Thyroid symmetric, normal size,  CHEST: chest symmetric with normal A/P diameter, no deformities noted  LUNGS: clear to auscultation and percussion  HEART: PMI normal, regular rate and rhythm, S1 and S2 normal, no S3 or S4, with no gallops, murmurs or rubs and no clicks  ABDOMEN: no guarding, ridigity or tenderness, no renal angle tenderness, no organomegaly, no masses, no free fluid, bowel sounds normal  NEUROLOGIC: skull and spine normal, CN II-XII intact, motor system- bulk, tone and power at all joints normal, sensory system- both superficial and deep normal, cerebellum normal, reflexes normal and symmetric and plantars flexor    ASSESSMENT: Medicare annual wellness visit, subsequent  (primary encounter diagnosis)  Recommended shingles vaccine      Annual physical exam  Plan: PREV EST AGE >64    Other hyperlipidemia  Plan: OFFICE OR OTHER OUTPT VISIT EST PT 20 TO 29         MINS LOW MDM LVL 3  Continue atorvastatin 42    Longstanding persistent atrial fibrillation (CMS/[facility])  Plan: OFFICE OR OTHER OUTPT VISIT EST PT 20 TO 29         MINS LOW MDM LVL 3  On [employer] MRI single pacemaker, 1/28/2020  Plan: OFFICE OR OTHER OUTPT VISIT EST PT 20 TO 29         MINS LOW MDM LVL 3  Sees EP    Essential hypertension  Plan: OFFICE OR OTHER OUTPT VISIT EST PT 20 TO 29         MINS LOW MDM LVL 3  Continue lisinopril 40 hydralazine 25 t.i.d. and amlodipine 2.5    Prediabetes  Plan: OFFICE OR OTHER OUTPT VISIT EST PT 20 TO 29         MINS LOW MDM LVL 3  Has done a really good job with diet    OSTEOPENIA  Plan: OFFICE OR OTHER OUTPT VISIT EST PT 20 TO 29         MINS LOW MDM LVL 3    Lumbosacral spondylosis without myelopathy  Plan: OFFICE OR OTHER OUTPT VISIT EST PT 20 TO 29         MINS LOW MDM LVL 3    COPD, severe  (CMS/[facility])  Plan: OFFICE OR OTHER OUTPT VISIT EST PT 20 TO 29         MINS LOW MDM LVL 3   Stable    More than 25 mins of this visit was spent addressing the  chronic medical problems, including ordering appropriate labs, refilling medications for the whole year and counseling  This was separate from the Medicare wellness visit      Suicide Prevention Lifeline Phone: [phone])

## 2025-02-14 NOTE — BH TREATMENT PLAN - NSTXDCOTHRGOAL_PSY_ALL_CORE
Patient will be referred to the appropriate level of outpatient treatment and have appointments within 3-5 days of discharge.  Patient will be discharged to safe housing either back home or DSS emergency housing.  Benefits in place   will explore need for dual diagnosis tx with appointments if needed.
Patient will be referred to the appropriate level of outpatient treatment and have appointments within 3-5 days of discharge.  Patient will be discharged to safe housing either back home or DSS emergency housing.  Benefits in place   will explore need for dual diagnosis tx with appointments if needed.

## 2025-02-14 NOTE — BH TREATMENT PLAN - NSDCCRITERIA_PSY_ALL_CORE
Increased acute risk because patient has suicidal thoughts however no plan or intent.  He knows how to communicate that to nurses and he is able to say that he will utilize that if thoughts become too intensive.    Protective factors: Supportive family    Mitigation of risk: Inpatient level of care, medication, safety plan.
Increased acute risk because patient has suicidal thoughts however no plan or intent.  He knows how to communicate that to nurses and he is able to say that he will utilize that if thoughts become too intensive.    Increased long-term risk considering the fact that he is noncompliant and has bipolar disorder.    Protective factors: Supportive family    Mitigation of risk: Inpatient level of care, medication, safety plan.

## 2025-02-14 NOTE — BH TREATMENT PLAN - NSTXDCOTHRINTERSW_PSY_ALL_CORE
SW to continue to follow.
MARY met with pt with Psych NP to discuss discharge planning. SW again discussed referrals to [facility] vs. [facility]. Pt signed consent to both [facility] and [facility] . Plan will be for [facility] if pt is discharged with insurance, as pt is currently Medicaid pending and financial counseling is in the process of assisting pt. If pt will not have insurance by the time he is discharged, SW will make referral to [facility]. Pt was provided with education on the importance of allowing contact with his father in order to confirm pt has a safe place to live upon discharge, but pt continued to refuse. Pt indicated he wanted to speak with his father and uncle first before providing consent. SW following up with pt 2 more times after initial conversation. Pt indicated he did not follow up with father and would not sign consent for contact him. Pt indicated that he now might want to stay with his Uncle upon discharge. He indicated he will let SW know either tomorrow or next week. Psych NP updated. MARY sent email to financial counseling for clarification on pt's insurance.

## 2025-02-14 NOTE — BH INPATIENT PSYCHIATRY PROGRESS NOTE - OTHER
denies, but has staring and internal preoccupation 

## 2025-02-15 RX ADMIN — ESCITALOPRAM OXALATE 10 MILLIGRAM(S): 20 TABLET ORAL at 09:09

## 2025-02-16 RX ADMIN — ESCITALOPRAM OXALATE 10 MILLIGRAM(S): 20 TABLET ORAL at 16:39

## 2025-02-17 RX ADMIN — ESCITALOPRAM OXALATE 10 MILLIGRAM(S): 20 TABLET ORAL at 09:18

## 2025-02-18 VITALS — TEMPERATURE: 98 F | OXYGEN SATURATION: 100 % | RESPIRATION RATE: 16 BRPM

## 2025-02-18 PROCEDURE — 99232 SBSQ HOSP IP/OBS MODERATE 35: CPT

## 2025-02-18 RX ORDER — ESCITALOPRAM OXALATE 20 MG/1
10 TABLET ORAL DAILY
Refills: 0 | Status: DISCONTINUED | OUTPATIENT
Start: 2025-02-18 | End: 2025-02-18

## 2025-02-18 RX ORDER — ESCITALOPRAM OXALATE 20 MG/1
1 TABLET ORAL
Refills: 0 | DISCHARGE
Start: 2025-02-18

## 2025-02-18 RX ADMIN — ESCITALOPRAM OXALATE 10 MILLIGRAM(S): 20 TABLET ORAL at 08:55

## 2025-02-18 NOTE — BH INPATIENT PSYCHIATRY DISCHARGE NOTE - NSBHDCRISKMITIGATEFT_PSY_ALL_CORE
Patient with supportive father willing to help.  Patient set up with [facility] clinic for outpatient therapy and medication management

## 2025-02-18 NOTE — BH DISCHARGE NOTE NURSING/SOCIAL WORK/PSYCH REHAB - NSDCPLANREVIEWED_PSY_ALL_CORE
Doctor, nurse, and  reviewed discharge plan with patient who agrees and accepts plan.  Pt provided with a copy of discharge paperwork and agrees to receive a copy of the discharge note in the patient portal.  Pt appropriately dressed for discharge and is transported home by family, friend or taxi to ensure safe arrival home.

## 2025-02-18 NOTE — BH INPATIENT PSYCHIATRY DISCHARGE NOTE - NSBHDCCONDITION_PSY_ALL_CORE
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 Patient also has been feeling sick for the last several days including diarrhea and cough. Patient states that there has been viral syndrome circulating the SNF. Reports R leg is more swollen, heavy with erythema, causes problem with ambulation. In ER he was noted febrile with hypotension. He received zosyn.     #RLE cellulitis  #Possible sepsis with hypotension, tachycardia and fever  #ARF on CRF stage 3  #Allergy to vancomycin and tetracycline  -recent R foot ulcer with osteomyelitis, s/p resection of the R 1st metatarsal head 10/24  -no foot cellulitis   - RVP panel negative   -BC x 2 noted  -on daptomycin 500 mg IV qd and ceftriaxone 2 gm IV qd # 2  -tolerating abx well so far; no side effects noted  -no further loose stools  -podiatry evaluation appreciated   -continue IV abx coverage for now  -plan to change to PO ceftin in AM if continues to improve  -monitor temps  -f/u CBC  -supportive care  2. Other issues:   -care per medicine    Clinical team may change from intravenous to oral antibiotics when the following criteria are met:   1. Patient is clinically improving/stable       a)	Improved signs and symptoms of infection from initial presentation       b)	Afebrile for 24 hours       c)	Leukocytosis trending towards normal range   2. Patient is tolerating oral intake   3. Initial/repeat blood cultures are negative     When above criteria met may change iv antibiotics to an oral ceftin as above    Symptomatic, but no longer needs inpatient level of care

## 2025-02-18 NOTE — BH INPATIENT PSYCHIATRY DISCHARGE NOTE - HOSPITAL COURSE
Pt consistently reports that for the past week, he has been clearing his home extensively of his and his father's belongings including things like his car keys and various electronics. The patient has been behaving oddly in the ED, perseverating on taking a shower, somewhat psychomotor agitated but able to be redirected. He makes vague comments about having conflicts with his father and says he has "thoughts of harming my father" because he has recently been more irritated by him, but he does not provide any specifics. He says his father has an order of protection against him not for physical violence but due to patient throwing out his father's belongings. Pt says this morning he "freaked out," felt "out of wack." When asked more about this, states he feels like he is "falling" mentally. Pt is requesting psychiatric hospitalization without clear reasoning.    Pt's speech is rapid, flight of ideas.    Patient diagnosis changed to OCD.    Patient treated with Lexapro started at 5mg daily PO and was increased to 10mg PO daily    Patient attended groups and therapy while inpatient.    Patient seen today in his room on inpatient unit. Patient reports mood as "good" with full and congruent affect. Patient reports a decrease in his stress about going back living with his father. Patient reports the "need to get rid of things for cleanliness" has continued to decrease. Patient reports feeling well on the medications and denies any side effect or adverse reaction at this time. Patient repost not wishing to take the ZYPREXA at this time. Patient reports good appetite and patient currently reports sleeping well between 6 and 8 hours a night. Continue to discuss with patient the discharge process to which patient reports the desire to follow up with [facility] outpatient for therapy and psychiatrist at this time. Patient is in agreement and demonstrates a willingness to be involved in treatment. Discussed with patients father with patients consent to which the father has no concerns and reports being willing to support the patient in whatever way he needs. Patient reports feeling better at this time. Patient is future and goal oriented in relation to his treatment and seeking help. Patient also expresses a feeling of happiness at the idea of going home in the near future. Patient continues to deny A/V hallucinations, SIIP, HIIP, and S&S of antonia at this time.    Patient with F/u at [facility] 2/19 at 12:30pm with Nancy NIETO and Dr. Vick on 2/25 at 9:30am

## 2025-02-18 NOTE — BH INPATIENT PSYCHIATRY PROGRESS NOTE - NSBHCHARTREVIEWVS_PSY_A_CORE FT
Vital Signs Last 24 Hrs  T(C): 36.6 (02-10-25 @ 07:32), Max: 36.6 (02-10-25 @ 07:32)  T(F): 97.8 (02-10-25 @ 07:32), Max: 97.8 (02-10-25 @ 07:32)  HR: --  BP: --  BP(mean): --  RR: 18 (02-10-25 @ 07:32) (18 - 18)  SpO2: 100% (02-10-25 @ 07:32) (100% - 100%)    Orthostatic VS  02-10-25 @ 07:32  Lying BP: --/-- HR: --  Sitting BP: 120/52 HR: 74  Standing BP: 120/73 HR: 76  Site: upper right arm  Mode: electronic  Orthostatic VS  02-09-25 @ 07:43  Lying BP: --/-- HR: --  Sitting BP: 121/74 HR: 72  Standing BP: 116/78 HR: 89  Site: upper right arm  Mode: electronic  
Vital Signs Last 24 Hrs  T(C): 36.6 (02-13-25 @ 07:22), Max: 36.6 (02-13-25 @ 07:22)  T(F): 97.9 (02-13-25 @ 07:22), Max: 97.9 (02-13-25 @ 07:22)  HR: --  BP: --  BP(mean): --  RR: 19 (02-13-25 @ 07:22) (19 - 19)  SpO2: 98% (02-13-25 @ 07:22) (98% - 98%)    Orthostatic VS  02-13-25 @ 07:22  Lying BP: --/-- HR: --  Sitting BP: 108/77 HR: 66  Standing BP: 111/78 HR: 65  Site: upper right arm  Mode: electronic  Orthostatic VS  02-12-25 @ 07:48  Lying BP: --/-- HR: --  Sitting BP: 123/75 HR: 65  Standing BP: 119/81 HR: 65  Site: upper right arm  Mode: electronic  
Vital Signs Last 24 Hrs  T(C): 36.6 (02-12-25 @ 07:48), Max: 36.6 (02-12-25 @ 07:48)  T(F): 97.8 (02-12-25 @ 07:48), Max: 97.8 (02-12-25 @ 07:48)  HR: --  BP: --  BP(mean): --  RR: 16 (02-12-25 @ 07:48) (16 - 16)  SpO2: 100% (02-12-25 @ 07:48) (100% - 100%)    Orthostatic VS  02-12-25 @ 07:48  Lying BP: --/-- HR: --  Sitting BP: 123/75 HR: 65  Standing BP: 119/81 HR: 65  Site: upper right arm  Mode: electronic  Orthostatic VS  02-11-25 @ 07:26  Lying BP: --/-- HR: --  Sitting BP: 130/87 HR: 73  Standing BP: 122/83 HR: 91  Site: upper right arm  Mode: electronic  
Vital Signs Last 24 Hrs  T(C): 36.4 (02-09-25 @ 07:43), Max: 36.4 (02-09-25 @ 07:43)  T(F): 97.6 (02-09-25 @ 07:43), Max: 97.6 (02-09-25 @ 07:43)  HR: --  BP: --  BP(mean): --  RR: 18 (02-09-25 @ 07:43) (18 - 18)  SpO2: 98% (02-09-25 @ 07:43) (98% - 98%)    Orthostatic VS  02-09-25 @ 07:43  Lying BP: --/-- HR: --  Sitting BP: 121/74 HR: 72  Standing BP: 116/78 HR: 89  Site: upper right arm  Mode: electronic  Orthostatic VS  02-07-25 @ 20:36  Lying BP: --/-- HR: --  Sitting BP: 127/69 HR: 74  Standing BP: 114/79 HR: 93  Site: --  Mode: --  
Vital Signs Last 24 Hrs  T(C): 36.6 (02-11-25 @ 07:26), Max: 36.6 (02-11-25 @ 07:26)  T(F): 97.8 (02-11-25 @ 07:26), Max: 97.8 (02-11-25 @ 07:26)  HR: --  BP: --  BP(mean): --  RR: 16 (02-11-25 @ 07:26) (16 - 16)  SpO2: 100% (02-11-25 @ 07:26) (100% - 100%)    Orthostatic VS  02-11-25 @ 07:26  Lying BP: --/-- HR: --  Sitting BP: 130/87 HR: 73  Standing BP: 122/83 HR: 91  Site: upper right arm  Mode: electronic  Orthostatic VS  02-10-25 @ 07:32  Lying BP: --/-- HR: --  Sitting BP: 120/52 HR: 74  Standing BP: 120/73 HR: 76  Site: upper right arm  Mode: electronic  
Vital Signs Last 24 Hrs  T(C): 36.6 (02-14-25 @ 06:55), Max: 36.6 (02-14-25 @ 06:55)  T(F): 97.8 (02-14-25 @ 06:55), Max: 97.8 (02-14-25 @ 06:55)  HR: --  BP: --  BP(mean): --  RR: 18 (02-14-25 @ 06:55) (18 - 18)  SpO2: 98% (02-14-25 @ 06:55) (98% - 98%)    Orthostatic VS  02-14-25 @ 06:55  Lying BP: --/-- HR: --  Sitting BP: 129/76 HR: 58  Standing BP: 135/83 HR: 64  Site: upper right arm  Mode: electronic  Orthostatic VS  02-13-25 @ 07:22  Lying BP: --/-- HR: --  Sitting BP: 108/77 HR: 66  Standing BP: 111/78 HR: 65  Site: upper right arm  Mode: electronic  
Vital Signs Last 24 Hrs  T(C): 36.7 (02-18-25 @ 07:21), Max: 36.7 (02-18-25 @ 07:21)  T(F): 98 (02-18-25 @ 07:21), Max: 98 (02-18-25 @ 07:21)  HR: --  BP: --  BP(mean): --  RR: 16 (02-18-25 @ 07:21) (16 - 16)  SpO2: 100% (02-18-25 @ 07:21) (100% - 100%)    Orthostatic VS  02-18-25 @ 07:21  Lying BP: --/-- HR: --  Sitting BP: 124/73 HR: 80  Standing BP: 116/78 HR: 100  Site: upper right arm  Mode: electronic  Orthostatic VS  02-17-25 @ 07:07  Lying BP: --/-- HR: --  Sitting BP: 124/73 HR: 79  Standing BP: 121/70 HR: 89  Site: upper right arm  Mode: electronic

## 2025-02-18 NOTE — BH INPATIENT PSYCHIATRY PROGRESS NOTE - NSDCCRITERIA_PSY_ALL_CORE
Increased acute risk because patient has suicidal thoughts however no plan or intent.  He knows how to communicate that to nurses and he is able to say that he will utilize that if thoughts become too intensive.    Increased long-term risk considering the fact that he is noncompliant and has bipolar disorder.    Protective factors: Supportive family    Mitigation of risk: Inpatient level of care, medication, safety plan.
Increased acute risk because patient has suicidal thoughts however no plan or intent.  He knows how to communicate that to nurses and he is able to say that he will utilize that if thoughts become too intensive.    Increased long-term risk considering the fact that he is noncompliant and has bipolar disorder.    Protective factors: Supportive family    Mitigation of risk: Inpatient level of care, medication, safety plan.
Increased acute risk because patient has suicidal thoughts however no plan or intent.  He knows how to communicate that to nurses and he is able to say that he will utilize that if thoughts become too intensive.    Protective factors: Supportive family    Mitigation of risk: Inpatient level of care, medication, safety plan.
Increased acute risk because patient has suicidal thoughts however no plan or intent.  He knows how to communicate that to nurses and he is able to say that he will utilize that if thoughts become too intensive.    Protective factors: Supportive family    Mitigation of risk: Inpatient level of care, medication, safety plan.
Increased acute risk because patient has suicidal thoughts however no plan or intent.  He knows how to communicate that to nurses and he is able to say that he will utilize that if thoughts become too intensive.    Increased long-term risk considering the fact that he is noncompliant and has bipolar disorder.    Protective factors: Supportive family    Mitigation of risk: Inpatient level of care, medication, safety plan.
Increased acute risk because patient has suicidal thoughts however no plan or intent.  He knows how to communicate that to nurses and he is able to say that he will utilize that if thoughts become too intensive.    Protective factors: Supportive family    Mitigation of risk: Inpatient level of care, medication, safety plan.
Increased acute risk because patient has suicidal thoughts however no plan or intent.  He knows how to communicate that to nurses and he is able to say that he will utilize that if thoughts become too intensive.    Protective factors: Supportive family    Mitigation of risk: Inpatient level of care, medication, safety plan.

## 2025-02-18 NOTE — BH INPATIENT PSYCHIATRY DISCHARGE NOTE - OTHER PAST PSYCHIATRIC HISTORY (INCLUDE DETAILS REGARDING ONSET, COURSE OF ILLNESS, INPATIENT/OUTPATIENT TREATMENT)
30 yo Male presents with acute anxiety and bizarre behavior, no outpt Tx. Presents with rapid speech, flight of ideas and intense eye contact.    Pt is a 30 yo Single, Male, unemployed living with father in private residence ([address] [place] [address]). 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 Pt reports no actual Psych Dx was seeing private outpt Psych MD/and on meds. Pt reports one prior psychiatric hospitalization 4 months ago at [place], currently not getting any treatment Pt did not like the MD he was seeing. Denies current substance/alcohol use has hx cocaine use. Denies childhood trauma/abuse, denies SI/HI, denies A/VH.    **  Pt Father has Order of Protection from Pt for "throwing out his belongings" Pt reports he can return home to father's house but does not want our staff to contact father or notify father of Pts admission. Pt did report "thoughts of harming my father" as he has become more irritated by him, Pt he does not provide any specifics or plans to harm father.    Pt reports his uncle can p/u when d/c'd and bring home to father's house...Sw to further discuss with Pt.     Pharm- Jesus Azul    [phone]

## 2025-02-18 NOTE — BH INPATIENT PSYCHIATRY DISCHARGE NOTE - ATTENDING DISCHARGE PHYSICAL EXAMINATION:
Admit patient along, discussed case with nurse practitioner and reviewed the records.  Patient is disappointed not depressed anxiety improved.  He denies having thoughts about harming self and others, he is not at imminent risk to harm self and others, he is not psychotic and he does not present danger to others or self.  I agree with nurse practitioners assessment and plan patient can be discharged.

## 2025-02-18 NOTE — BH INPATIENT PSYCHIATRY PROGRESS NOTE - NSBHASSESSSUMMFT_PSY_ALL_CORE
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    Pt presents with odd behavior, pressured speech, flight of ideas, irritability consistent with manic symptoms and possible bipolar disorder. At this time, it is difficult to clearly distinguish manic behaviors from more psychotic like symptoms. Pt is requesting hospitalization and meets involuntary criteria as he is illogical, showing impaired insight, making violent threatening comments about his father.    Plan:    Admit to inpatient level of care.    Medication discussed with patient because he is refusing to take any.    Educated about his current symptoms and that both patient and provider agree that it is suffering.  In order to address the suffering and discomfort we used medication.  Educated patient about benefits of medication and our goal is to discharge him but if he keeps refusing we will not be able to get to that goal.    He agrees to take medication from panic attacks and anxiety.  Prescribed Ativan 1 mg p.o. as needed every 4 hours for anxiety and also Ativan 2 mg IM for severe agitation without psychosis.    Patient is Zyprexa 50mg  at bedtime encouraged to take.  He reports insomnia and educated patient that he will sleep better with Zyprexa.    Provider returning as needed for insomnia.    Zyprexa and Ativan should not be used within an hour    2/9/25:  Patient reports that he took medication and feels better.  He slept well.  However he continues to be in distress and continues to be anxious with internal preoccupation appearance.    Patient last was his father not to come to visit and does not want us to contact his father.    At this time patient is compliant with medication.  Continue current plan.    Patient is also asking for  to see him and to help him restart his Medicaid.        
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    Patient seen today in his room on inpatient unit. Patient states that he took his medication as advised and that he feels good today. Patient reports that a lot of his stress comes from living with his father. Patient reports the "need to get rid of things for cleanliness". He also states that the need to "discard things" is "far less today" then it has been for the past few days. Patient reports feeling well on the medications and denies any side effect or adverse reaction at this time. Patient reports good appetite and patient currently reports sleeping well at this time despite not using his PRN sleep medications. Reeducated patient to ask if he feels he needs it to which the patient reports he will. Discussed with patient the discharge process to which patient reports the desire to follow up with [facility] outpatient for therapy and psychiatrist at this time. Discussed with patient the need to speak to his father concerning his discharge. Patient reports that he will attempts to get ahold of his uncle as well as his father today as he would like to discuss his plans for after discharge prior to us contacting him. Patient continues to deny A/V hallucinations, SIIP, HIIP, and S&S of antonia at this time.    Plan:    1: Continue with inpatient level of care.    2: Continue with medication as prescribed at this time.
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    Patient seen today in his room on inpatient unit. Patient reports mood as "good" with full and congruent affect. Patient reports a decrease in his stress about going back living with his father. Patient reports the "need to get rid of things for cleanliness" has continued to decrease. Patient reports feeling well on the medications and denies any side effect or adverse reaction at this time. Patient repost not wishing to take the ZYPREXA at this time. Patient reports good appetite and patient currently reports sleeping well between 6 and 8 hours a night. Continue to discuss with patient the discharge process to which patient reports the desire to follow up with [facility] outpatient for therapy and psychiatrist at this time. Patient is in agreement and demonstrates a willingness to be involved in treatment. Discussed with patients father with patients consent to which the father has no concerns and reports being willing to support the patient in whatever way he needs. Patient reports feeling better at this time. Patient is future and goal oriented in relation to his treatment and seeking help. Patient also expresses a feeling of happiness at the idea of going home in the near future. Patient continues to deny A/V hallucinations, SIIP, HIIP, and S&S of antonia at this time.    Plan:    1: Continue with medications as prescribed at this time.    2: Discharge patient home with father with appropriate aftercare.
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    Patient seen today in his room on inpatient unit. Patient states that he took his medication as advised and that he feels good today. Patient continues having episodes of staring and looks distracted although is less today. Patient reports that a lot of his stress comes from living with his father. Patient reports the "need to get rid of things for cleanliness". He also states that the need to "discard things" is "far less today" then it has been for the past few days. Patient maintains that he does not want his father to communicate with us or to come to visit him at this time. Discussed with the patient the increase in his Lexapro from 5mg PO daily to 10mg daily for control of his anxiety related to his OCD. Patient currently reports sleeping well at this time despite not using his PRN sleep medications. Reeducated patient to ask if he feels he needs it to which the patient reports he will. Patient continues to deny A/V hallucinations, SIIP, HIIP, and S&S of antonia at this time.    Plan:    1: Continue with inpatient level of care.    2: Continue with medication as prescribed at this time.
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    Patient states that he took his medication as advised and that he already feels better today. However patient continues to appear anxious and internally preoccupied denying auditory or visual hallucinations. Patient continues having episodes of staring and looks distracted. Patient reports having a PPH of OCD, ADD, and anxiety. Patient reports that a lot of his stress comes from living with his father. Patient reports the "need to get rid of things for cleanliness". He also states that he does not want to return back to reside with his father (except to get his belongings) and that he has an uncle whom he would like to call and see if he can move in with him. Patient maintains that he does not want his father to communicate with us or to come to visit him at this time. Discussed with the patient the desire to increase his does of Lexapro from 5mg PO daily to 10mg daily for control of his anxiety related to his OCD. Patient is in agreement with this coarse of action. He was also made aware of the ability to ask for ativan if feeling overwhelmed by anxiety. Patient displays an understanding of this. Patient request to have his po ZYPREXA D/c at this time. Patient made aware of the possible side effect that stopping the medication can have and he is aware and still wishes to stop this medication. Also discussed with the patient starting Trazadone 50mg PO PRN daily at bedtime. Patient displays an understanding of the need to ask for the medication if he feels he needs it to sleep at night and is in agreement. Patient continues to deny A/V hallucinations, SIIP, and HIIP at this time.    Plan:    1: Admit to inpatient level of care.    2: D/c ZYPREXA 5mg PO daily at this time.    3: Increase Lexapro to 10mg PO daily     4: Start Trazadone 50mg PO PRN at bedtime for insomnia as needed. 
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    Patient seen today in his room on inpatient unit. Patient continues to report that a lot of his stress comes from living with his father. Patient reports the "need to get rid of things for cleanliness" has continued to decrease. Patient reports feeling well on the medications and denies any side effect or adverse reaction at this time. Patient reports good appetite and patient currently reports sleeping well. Continue to discuss with patient the discharge process to which patient reports the desire to follow up with [facility] outpatient for therapy and psychiatrist at this time. Discussed with patient the need to speak to his father concerning his discharge. Patient reeducated on the importance of talking to his father to ensure a safe and appropriate discharge. Patient at this time seen looking over his shoulder repeatedly and viewed having dialogue without the presence of another person present at this time. When asked about is patient reported he has enjoying the music that was playing. It is unclear if this is internal preoccupation at this time. Discussed starting patient on 2.5mg PO of olanzapine at HS at this time to which the patient agreed to. Patient continues to deny A/V hallucinations, SIIP, HIIP, and S&S of antonia at this time.    Plan:    1: Start olanzapine 2.5mg PO at bedtime.    2: Continue with inpatient level of care.    3: Continue with other medications as prescribed at this time.
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    Patient seen today in his room on inpatient unit. Patient states that he took his medication as advised and that he feels good today. Patient reports that a lot of his stress comes from living with his father. Patient reports the "need to get rid of things for cleanliness". He also states that the need to "discard things" is "far less today" then it has been for the past few days. Patient reports feeling well on the medications and denies any side effect or adverse reaction at this time. Patient reports good appetite and patient currently reports sleeping well at this time despite not using his PRN sleep medications. Reeducated patient to ask if he feels he needs it to which the patient reports he will. Discussed with patient the discharge process to which patient reports the desire to follow up with [facility] outpatient for therapy and psychiatrist at this time. Discussed with patient the need to speak to his father concerning his discharge. Patient reports that he will attempts to get a-hold of his uncle as well as his father today as he would like to discuss his plans for after discharge prior to us contacting him. Patient reports not calling either his father or uncles and at this time still is refusing to give consent for this provider or anyone to contact his father. Patient reeducated on the importance of talking to his father to ensure a safe and appropriate discharge. Patient reports that he will continue to think about it. Patient reports concern about his "conflict" with his father. Will revisit the subject in future session. Patient continues to deny A/V hallucinations, SIIP, HIIP, and S&S of antonia at this time.    Plan:    1: Continue with inpatient level of care.    2: Continue with medication as prescribed at this time.

## 2025-02-18 NOTE — BH INPATIENT PSYCHIATRY PROGRESS NOTE - PRN MEDS
Caller: Cherie Soriano    Relationship: Self    Best call back number: [phone]    What is the best time to reach you: ANYTIME    Who are you requesting to speak with (clinical staff, provider,  specific staff member): CLINICAL    What was the call regarding: THE PATIENT STATED PCP SENT A REFERRAL TO AN [facility] OVER A MONTH AGO. THEY HAVE NEVER CALLED TO MAKE AN APPOINTMENT, WHEN SHE CONTACTED THEM, THEY STATED THEY DID NOT RECEIVE THE REFERRAL. SHE WOULD LIKE FOR PCP TO SEND REFERRAL AGAIN AND A CALL BACK TO CONFIRM    Do you require a callback: YES        
Pt of Dr. Arias's called. Stated that she is experiencing a discharge and odor after her surgery on the 18th of March.  Pt is not experiencing any other symptom.   Please advise.   
MEDICATIONS  (PRN):  LORazepam     Tablet 1 milliGRAM(s) Oral every 4 hours PRN Anxiety  LORazepam   Injectable 2 milliGRAM(s) IntraMuscular every 6 hours PRN severe agitation without psychosis  OLANZapine 2.5 milliGRAM(s) Oral every 6 hours PRN moderate psychotic agitation  OLANZapine Injectable 5 milliGRAM(s) IntraMuscular every 6 hours PRN severe psychotic agitation  traZODone 50 milliGRAM(s) Oral at bedtime PRN insomnia  
MEDICATIONS  (PRN):  OLANZapine 2.5 milliGRAM(s) Oral every 6 hours PRN moderate psychotic agitation  OLANZapine Injectable 5 milliGRAM(s) IntraMuscular every 6 hours PRN severe psychotic agitation  traZODone 50 milliGRAM(s) Oral at bedtime PRN insomnia  
MEDICATIONS  (PRN):  LORazepam     Tablet 1 milliGRAM(s) Oral every 4 hours PRN Anxiety  LORazepam   Injectable 2 milliGRAM(s) IntraMuscular every 6 hours PRN severe agitation without psychosis  OLANZapine 2.5 milliGRAM(s) Oral every 6 hours PRN moderate psychotic agitation  OLANZapine Injectable 5 milliGRAM(s) IntraMuscular every 6 hours PRN severe psychotic agitation  
MEDICATIONS  (PRN):  LORazepam     Tablet 1 milliGRAM(s) Oral every 4 hours PRN Anxiety  LORazepam   Injectable 2 milliGRAM(s) IntraMuscular every 6 hours PRN severe agitation without psychosis  OLANZapine 2.5 milliGRAM(s) Oral every 6 hours PRN moderate psychotic agitation  OLANZapine Injectable 5 milliGRAM(s) IntraMuscular every 6 hours PRN severe psychotic agitation  traZODone 50 milliGRAM(s) Oral at bedtime PRN insomnia  
MEDICATIONS  (PRN):  LORazepam     Tablet 1 milliGRAM(s) Oral every 4 hours PRN Anxiety  LORazepam   Injectable 2 milliGRAM(s) IntraMuscular every 6 hours PRN severe agitation without psychosis  OLANZapine 2.5 milliGRAM(s) Oral every 6 hours PRN moderate psychotic agitation  OLANZapine Injectable 5 milliGRAM(s) IntraMuscular every 6 hours PRN severe psychotic agitation  traZODone 50 milliGRAM(s) Oral at bedtime PRN insomnia

## 2025-02-18 NOTE — BH INPATIENT PSYCHIATRY DISCHARGE NOTE - ADDITIONAL DETAILS / COMMENTS
Patient with F/u at [facility] 2/19 at 12:30 with Nancy NIETO and Dr. Vick on 2/25 at 9:30

## 2025-02-18 NOTE — BH INPATIENT PSYCHIATRY PROGRESS NOTE - NSBHATTESTBILLING_PSY_A_CORE
98407-Xzinphajah OBS or IP - moderate complexity OR 35-49 mins
19327-Zvkftloocn OBS or IP - moderate complexity OR 35-49 mins
77255-Axohiewvsc OBS or IP - moderate complexity OR 35-49 mins
50243-Qusstxwafe OBS or IP - moderate complexity OR 35-49 mins
99222-Initial OBS or IP - moderate complexity OR 55-74 mins
48759-Zhnmlsgmee OBS or IP - moderate complexity OR 35-49 mins
05879-Syudqertkf OBS or IP - moderate complexity OR 35-49 mins

## 2025-02-18 NOTE — BH INPATIENT PSYCHIATRY PROGRESS NOTE - NSBHFUPINTERVALCCFT_PSY_A_CORE
"I feel good today"
I feel better after medication.
"I feel good today"
I feel better after medication.

## 2025-02-18 NOTE — BH DISCHARGE NOTE NURSING/SOCIAL WORK/PSYCH REHAB - PATIENT PORTAL LINK FT
You can access the FollowMyHealth Patient Portal offered by [facility] by registering at the following website: [email or web address]. By joining [facility]’s FollowMyHealth portal, you will also be able to view your health information using other applications (apps) compatible with our system.

## 2025-02-18 NOTE — BH INPATIENT PSYCHIATRY PROGRESS NOTE - NSBHATTESTATTENDBILLTIME_PSY_A_CORE
I attest my time as attending is greater than VIRAJ time spent on qualifying patient care activities.

## 2025-02-18 NOTE — BH INPATIENT PSYCHIATRY PROGRESS NOTE - NSBHMSEPERCEPT_PSY_A_CORE
No abnormalities/Other
No abnormalities/Other
No abnormalities
No abnormalities/Other

## 2025-02-18 NOTE — BH INPATIENT PSYCHIATRY PROGRESS NOTE - NSBHMSETHTPROC_PSY_A_CORE
Linear/Overinclusive
Linear/Overinclusive
Flight of ideas/Illogical
Linear/Overinclusive

## 2025-02-18 NOTE — BH DISCHARGE NOTE NURSING/SOCIAL WORK/PSYCH REHAB - NSDCPRGOAL_PSY_ALL_CORE
Pt worked on developing and improving coping skills and safety planning through participation in unit programming.

## 2025-02-18 NOTE — BH INPATIENT PSYCHIATRY PROGRESS NOTE - NSTXDCOTHRGOAL_PSY_ALL_CORE
Patient will be referred to the appropriate level of outpatient treatment and have appointments within 3-5 days of discharge.Patient will be discharged to safe housing either back home or DSS emergency housing.Benefits in placeSocial worker will explore need for dual diagnosis tx with appointments if needed.
Patient will be referred to the appropriate level of outpatient treatment and have appointments within 3-5 days of discharge.  Patient will be discharged to safe housing either back home or DSS emergency housing.  Benefits in place   will explore need for dual diagnosis tx with appointments if needed.

## 2025-02-18 NOTE — BH DISCHARGE NOTE NURSING/SOCIAL WORK/PSYCH REHAB - NSCDUDCCRISIS_PSY_A_CORE
.  [facility] – Crisis Care for Children, Adults and Families  [address] [place] [address]  Mobile Crisis Hotline – (525) 877-8268/.National Suicide Prevention Lifeline 0 (718) 590-6904/.  [facility]  1 (781) LIFENET (226-3247)/.  [facility]  (200) 240-5738/.  [facility] Response Crisis Hotline  (753) 867-4041  24 hour telephone crisis intervention and suicide prevention hotline concerned with all mental health issues/Other.../988 Suicide and Crisis Lifeline

## 2025-02-18 NOTE — BH INPATIENT PSYCHIATRY PROGRESS NOTE - NSBHFUPINTERVALHXFT_PSY_A_CORE
Patient states that he took his medication as advised and that he already feels better today. However patient continues to appear anxious and internally preoccupied denying auditory or visual hallucinations. Patient continues having episodes of staring and looks distracted. Patient reports having a PPH of OCD, ADD, and anxiety. Patient reports that a lot of his stress comes from living with his father. Patient reports the "need to get rid of things for cleanliness". He also states that he does not want to return back to reside with his father (except to get his belongings) and that he has an uncle whom he would like to call and see if he can move in with him. Patient maintains that he does not want his father to communicate with us or to come to visit him at this time. Discussed with the patient the desire to increase his does of Lexapro from 5mg PO daily to 10mg daily for control of his anxiety related to his OCD. Patient is in agreement with this coarse of action. He was also made aware of the ability to ask for ativan if feeling overwhelmed by anxiety. Patient displays an understanding of this. Patient request to have his po ZYPREXA D/c at this time. Patient made aware of the possible side effect that stopping the medication can have and he is aware and still wishes to stop this medication. Also discussed with the patient starting Trazadone 50mg PO PRN daily at bedtime. Patient displays an understanding of the need to ask for the medication if he feels he needs it to sleep at night and is in agreement. Patient continues to deny A/V hallucinations, SIIP, and HIIP at this time.
Patient seen today in his room on inpatient unit. Patient states that he took his medication as advised and that he feels good today. Patient reports that a lot of his stress comes from living with his father. Patient reports the "need to get rid of things for cleanliness". He also states that the need to "discard things" is "far less today" then it has been for the past few days. Patient reports feeling well on the medications and denies any side effect or adverse reaction at this time. Patient reports good appetite and patient currently reports sleeping well at this time despite not using his PRN sleep medications. Reeducated patient to ask if he feels he needs it to which the patient reports he will. Discussed with patient the discharge process to which patient reports the desire to follow up with [facility] outpatient for therapy and psychiatrist at this time. Discussed with patient the need to speak to his father concerning his discharge. Patient reports that he will attempts to get a-hold of his uncle as well as his father today as he would like to discuss his plans for after discharge prior to us contacting him. Patient continues to deny A/V hallucinations, SIIP, HIIP, and S&S of antonia at this time.
Patient seen today in his room on inpatient unit. Patient states that he took his medication as advised and that he feels good today. Patient reports that a lot of his stress comes from living with his father. Patient reports the "need to get rid of things for cleanliness". He also states that the need to "discard things" is "far less today" then it has been for the past few days. Patient reports feeling well on the medications and denies any side effect or adverse reaction at this time. Patient reports good appetite and patient currently reports sleeping well at this time despite not using his PRN sleep medications. Reeducated patient to ask if he feels he needs it to which the patient reports he will. Discussed with patient the discharge process to which patient reports the desire to follow up with [facility] outpatient for therapy and psychiatrist at this time. Discussed with patient the need to speak to his father concerning his discharge. Patient reports that he will attempts to get a-hold of his uncle as well as his father today as he would like to discuss his plans for after discharge prior to us contacting him. Patient reports not calling either his father or uncles and at this time still is refusing to give consent for this provider or anyone to contact his father. Patient reeducated on the importance of talking to his father to ensure a safe and appropriate discharge. Patient reports that he will continue to think about it. Patient reports concern about his "conflict" with his father. Will revisit the subject in future session. Patient continues to deny A/V hallucinations, SIIP, HIIP, and S&S of antonia at this time.
Patient seen today in his room on inpatient unit. Patient continues to report that a lot of his stress comes from living with his father. Patient reports the "need to get rid of things for cleanliness" has continued to decrease. Patient reports feeling well on the medications and denies any side effect or adverse reaction at this time. Patient reports good appetite and patient currently reports sleeping well. Continue to discuss with patient the discharge process to which patient reports the desire to follow up with [facility] outpatient for therapy and psychiatrist at this time. Discussed with patient the need to speak to his father concerning his discharge. Patient reeducated on the importance of talking to his father to ensure a safe and appropriate discharge. Patient at this time seen looking over his shoulder repeatedly and viewed having dialogue without the presence of another person present at this time. When asked about is patient reported he has enjoying the music that was playing. It is unclear if this is internal preoccupation at this time. Discussed starting patient on 2.5mg PO of olanzapine at  at this time to which the patient agreed to. Patient continues to deny A/V hallucinations, SIIP, HIIP, and S&S of antonia at this time.
Patient states that he took his medication as advised and that he already feels better.  However he appears anxious and internally preoccupied denying auditory donations.    He has episodes of staring and looks distracted.    He continues to be depressed and anxious.  However he is taking medication.    He reported that he slept well last night.    He also states that he does not want to return back to reside with his father and that he has an uncle whom he would like to call and see if he can go there.    Patient bring his father for all probable and for him throwing away items that belong to him and to his father and believes that the source of for problems.  Patient does not want his father to communicate with us or to come to visit him.
Patient seen today in his room on inpatient unit. Patient states that he took his medication as advised and that he feels good today. Patient continues having episodes of staring and looks distracted although is less today. Patient reports that a lot of his stress comes from living with his father. Patient reports the "need to get rid of things for cleanliness". He also states that the need to "discard things" is "far less today" then it has been for the past few days. Patient maintains that he does not want his father to communicate with us or to come to visit him at this time. Discussed with the patient the increase in his Lexapro from 5mg PO daily to 10mg daily for control of his anxiety related to his OCD. Patient currently reports sleeping well at this time despite not using his PRN sleep medications. Reeducated patient to ask if he feels he needs it to which the patient reports he will. Patient continues to deny A/V hallucinations, SIIP, HIIP, and S&S of antonia at this time.
Patient seen today in his room on inpatient unit. Patient reports mood as "good" with full and congruent affect. Patient reports a decrease in his stress about going back living with his father. Patient reports the "need to get rid of things for cleanliness" has continued to decrease. Patient reports feeling well on the medications and denies any side effect or adverse reaction at this time. Patient repost not wishing to take the ZYPREXA at this time. Patient reports good appetite and patient currently reports sleeping well between 6 and 8 hours a night. Continue to discuss with patient the discharge process to which patient reports the desire to follow up with [facility] outpatient for therapy and psychiatrist at this time. Patient is in agreement and demonstrates a willingness to be involved in treatment. Discussed with patients father with patients consent to which the father has no concerns and reports being willing to support the patient in whatever way he needs. Patient reports feeling better at this time. Patient is future and goal oriented in relation to his treatment and seeking help. Patient also expresses a feeling of happiness at the idea of going home in the near future. Patient continues to deny A/V hallucinations, SIIP, HIIP, and S&S of antonia at this time.

## 2025-02-18 NOTE — BH INPATIENT PSYCHIATRY DISCHARGE NOTE - NSBHMETABOLIC_PSY_ALL_CORE_FT
BMI: BMI (kg/m2): 22.1 (02-07-25 @ 20:36)  HbA1c: A1C with Estimated Average Glucose Result: 5.2 % (02-10-25 @ 08:54)    Glucose:   BP: --Vital Signs Last 24 Hrs  T(C): 36.7 (02-18-25 @ 07:21), Max: 36.7 (02-18-25 @ 07:21)  T(F): 98 (02-18-25 @ 07:21), Max: 98 (02-18-25 @ 07:21)  HR: --  BP: --  BP(mean): --  RR: 16 (02-18-25 @ 07:21) (16 - 16)  SpO2: 100% (02-18-25 @ 07:21) (100% - 100%)    Orthostatic VS  02-18-25 @ 07:21  Lying BP: --/-- HR: --  Sitting BP: 124/73 HR: 80  Standing BP: 116/78 HR: 100  Site: upper right arm  Mode: electronic  Orthostatic VS  02-17-25 @ 07:07  Lying BP: --/-- HR: --  Sitting BP: 124/73 HR: 79  Standing BP: 121/70 HR: 89  Site: upper right arm  Mode: electronic    Lipid Panel:

## 2025-02-18 NOTE — BH INPATIENT PSYCHIATRY DISCHARGE NOTE - HPI (INCLUDE ILLNESS QUALITY, SEVERITY, DURATION, TIMING, CONTEXT, MODIFYING FACTORS, ASSOCIATED SIGNS AND SYMPTOMS)
This is 29 years old white man who was admitted July for psychotic outbreak, paranoid, internally preoccupied, has urges to throw objects either his father's or his arm belongings to the garbage which leads to distress and family.    Patient describes panic attacks when he had the sudden next set of attacks of overwhelming anxiety sweating chest pain.    He also said that he gets labile, he starts crying he lays down the floor he cannot tolerate panic attacks.  The same time he says he does not want a medication.      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    Pt consistently reports that for the past week, he has been clearing his home extensively of his and his father's belongings including things like his car keys and various electronics. The patient has been behaving oddly in the ED, perseverating on taking a shower, somewhat psychomotor agitated but able to be redirected. He makes vague comments about having conflicts with his father and says he has "thoughts of harming my father" because he has recently been more irritated by him, but he does not provide any specifics. He says his father has an order of protection against him not for physical violence but due to patient throwing out his father's belongings. Pt says this morning he "freaked out," felt "out of wack." When asked more about this, states he feels like he is "falling" mentally. Pt is requesting psychiatric hospitalization without clear reasoning.    Pt's speech is rapid, flight of ideas.

## 2025-02-18 NOTE — BH INPATIENT PSYCHIATRY PROGRESS NOTE - CURRENT MEDICATION
MEDICATIONS  (STANDING):  escitalopram 10 milliGRAM(s) Oral daily  melatonin 5 milliGRAM(s) Oral at bedtime  OLANZapine 2.5 milliGRAM(s) Oral at bedtime    MEDICATIONS  (PRN):  OLANZapine 2.5 milliGRAM(s) Oral every 6 hours PRN moderate psychotic agitation  OLANZapine Injectable 5 milliGRAM(s) IntraMuscular every 6 hours PRN severe psychotic agitation  traZODone 50 milliGRAM(s) Oral at bedtime PRN insomnia   MEDICATIONS  (STANDING):  escitalopram 10 milliGRAM(s) Oral daily  escitalopram 10 milliGRAM(s) Oral daily  melatonin 5 milliGRAM(s) Oral at bedtime  OLANZapine 2.5 milliGRAM(s) Oral at bedtime    MEDICATIONS  (PRN):  OLANZapine 2.5 milliGRAM(s) Oral every 6 hours PRN moderate psychotic agitation  OLANZapine Injectable 5 milliGRAM(s) IntraMuscular every 6 hours PRN severe psychotic agitation  traZODone 50 milliGRAM(s) Oral at bedtime PRN insomnia

## 2025-02-18 NOTE — BH INPATIENT PSYCHIATRY PROGRESS NOTE - ATTENDING COMMENTS
I saw the patient along.  Patient reports feeling better.  He denies being depressed.  His anxiety significantly improved.  Patient denies responding internal stimuli and hearing voices.  He has no thoughts about harming self or anybody else.  Patient wants to be moving back to his father and he and his father are talking.  Patient has anticipatory anxiety about being discharged and would like to go home as soon as possible.  (Time patient is not at imminent risk and his acute risk is low so he can safely discharged.

## 2025-02-18 NOTE — BH DISCHARGE NOTE NURSING/SOCIAL WORK/PSYCH REHAB - NSDCADDINFO2FT_PSY_ALL_CORE
Pt has an in person aftercare appointment scheduled on 2/25 with Dr. Vick from 9:30 AM to 11:30 AM at [facility].

## 2025-02-18 NOTE — BH INPATIENT PSYCHIATRY DISCHARGE NOTE - NSDCPROCEDURESFT_PSY_ALL_CORE
< from: 12 Lead ECG (02.07.25 @ 12:35) >    Ventricular Rate 61 BPM    Atrial Rate 61 BPM    P-R Interval 152 ms    QRS Duration 80 ms    Q-T Interval 416 ms    QTC Calculation(Bazett) 418 ms    P Axis 55 degrees    R Axis 77 degrees    T Axis 72 degrees    Diagnosis Line Normal sinus rhythm  Normal ECG  When compared with ECG of 03-NOV-2017 16:17,  No significant change was found  Confirmed by ELIAS ROMANO ([id number]) on 2/7/2025 1:09:25 PM    A1C with Estimated Average Glucose Result: 5.2: Method: Immunoassay     Basic Metabolic Panel in AM (02.10.25 @ 08:54)   Sodium: 139 mmol/L  Potassium: 3.9 mmol/L  Chloride: 110 mmol/L  Carbon Dioxide: 25 mmol/L  Anion Gap: 4 mmol/L  Blood Urea Nitrogen: 29 mg/dL  Creatinine: 1.27 mg/dL  Glucose: 95 mg/dL  Calcium: 9.7 mg/dL  eGFR: 78:    Complete Blood Count + Automated Diff (02.07.25 @ 12:31)   WBC Count: 5.31 K/uL  RBC Count: 4.88 M/uL  Hemoglobin: 14.0 g/dL  Hematocrit: 40.8 %  Mean Cell Volume: 83.6 fl  Mean Cell Hemoglobin: 28.7 pg  Mean Cell Hemoglobin Conc: 34.3 g/dL  Red Cell Distrib Width: 12.9 %  Platelet Count - Automated: 219 K/uL  Auto Neutrophil #: 3.90 K/uL  Auto Lymphocyte #: 0.94 K/uL  Auto Monocyte #: 0.36 K/uL  Auto Eosinophil #: 0.07 K/uL  Auto Basophil #: 0.03 K/uL  Auto Neutrophil %: 73.4: Differential percentages must be correlated with absolute numbers for   clinical significance. %  Auto Lymphocyte %: 17.7 %  Auto Monocyte %: 6.8 %  Auto Eosinophil %: 1.3 %  Auto Basophil %: 0.6 %  Auto Immature Granulocyte %: 0.2    UTOX: negative    Covid: negative   < from: 12 Lead ECG (02.07.25 @ 12:35) >    Ventricular Rate 61 BPM    Atrial Rate 61 BPM    P-R Interval 152 ms    QRS Duration 80 ms    Q-T Interval 416 ms    QTC Calculation(Bazett) 418 ms    P Axis 55 degrees    R Axis 77 degrees    T Axis 72 degrees    Diagnosis Line Normal sinus rhythm  Normal ECG  When compared with ECG of 03-NOV-2017 16:17,  No significant change was found  Confirmed by ELIAS ROMANO ([id number]) on 2/7/2025 1:09:25 PM    A1C with Estimated Average Glucose Result: 5.2: Method: Immunoassay     Basic Metabolic Panel in AM (02.10.25 @ 08:54)   Sodium: 139 mmol/L  Potassium: 3.9 mmol/L  Chloride: 110 mmol/L  Carbon Dioxide: 25 mmol/L  Anion Gap: 4 mmol/L  Blood Urea Nitrogen: 29 mg/dL  Creatinine: 1.27 mg/dL  Glucose: 95 mg/dL  Calcium: 9.7 mg/dL  eGFR: 78:    Complete Blood Count + Automated Diff (02.07.25 @ 12:31)   WBC Count: 5.31 K/uL  RBC Count: 4.88 M/uL  Hemoglobin: 14.0 g/dL  Hematocrit: 40.8 %  Mean Cell Volume: 83.6 fl  Mean Cell Hemoglobin: 28.7 pg  Mean Cell Hemoglobin Conc: 34.3 g/dL  Red Cell Distrib Width: 12.9 %  Platelet Count - Automated: 219 K/uL  Auto Neutrophil #: 3.90 K/uL  Auto Lymphocyte #: 0.94 K/uL  Auto Monocyte #: 0.36 K/uL  Auto Eosinophil #: 0.07 K/uL  Auto Basophil #: 0.03 K/uL  Auto Neutrophil %: 73.4: Differential percentages must be correlated with absolute numbers for   clinical significance. %  Auto Lymphocyte %: 17.7 %  Auto Monocyte %: 6.8 %  Auto Eosinophil %: 1.3 %  Auto Basophil %: 0.6 %  Auto Immature Granulocyte %: 0.2    UTOX: Negative    Covid: Negative

## 2025-02-18 NOTE — BH INPATIENT PSYCHIATRY PROGRESS NOTE - NSBHMSETHTCONTENT_PSY_A_CORE
Preoccupations
Preoccupations
Unremarkable/Preoccupations
Preoccupations

## 2025-02-18 NOTE — BH DISCHARGE NOTE NURSING/SOCIAL WORK/PSYCH REHAB - NSDCINSTRUCTIONS_PSY_ALL_CORE_FT
When discharged, take only medications prescribed as instructed by your hospital provider    Do not stop or change any medications until you discuss changes with your own prescriber    Do not take any other medications, including left over medications from before your admission, over the counter medications or herbal supplements, unless you discuss with your own provider Purse String (Simple) Text: We discussed various closure modalities with the patient, including healing by second intention, primary closure, skin graft and various flaps, and felt that the location and configuration of the defect indicated that a purse-string closure would result in the least disturbance of the position and function of the surrounding anatomic structures and provide the best result.  The technique, its benefits, alternatives and risks were discussed with the patient.  Appropriate instructions were given, informed consent was obtained, and then the patient underwent the procedure as follows: The patient was taken to the operative suite and placed supine on the operating room table.  The area of the defect and the surrounding skin was anesthetized with buffered 0.5% lidocaine with epinephrine.  The area was washed with chlorhexidine.  Sterile drapes were applied. \\n\\nThe wound edges of the Mohs surgery defect were debeveled. \\n Undermining was performed circumferentially around the surgical defect.  Closure of the subcutaneous was achieved with a deep buried purse string suture, which was then placed and tightened in the subcutaneous tissue.  A superficial layer of horizontal mattress suture was then placed through the epidermis in a purse-string fashion.

## 2025-02-18 NOTE — BH DISCHARGE NOTE NURSING/SOCIAL WORK/PSYCH REHAB - NSDCPRRECOMMEND_PSY_ALL_CORE
It is recommended pt refer to safety plan and to stay connected to outpatient treatment for support in the community.

## 2025-02-18 NOTE — BH INPATIENT PSYCHIATRY PROGRESS NOTE - NSBHCONSDANGERSELF_PSY_A_CORE
suicidal ideation with plan and means/unable to care for self

## 2025-02-18 NOTE — BH INPATIENT PSYCHIATRY PROGRESS NOTE - NSBHATTESTTYPEVISIT_PSY_A_CORE
On-site Attending supervising VIRAJ (99XXX codes)
Attending Only
On-site Attending supervising VIRAJ (99XXX codes)

## 2025-02-18 NOTE — BH DISCHARGE NOTE NURSING/SOCIAL WORK/PSYCH REHAB - NSDCADDINFO1FT_PSY_ALL_CORE
Please contact Dr. Jed Scott for medication or treatment questions, lab results or any other concerns at [phone]. Handoff provided to your outpatient provider, [facility].  Patient has agreed to receive a copy of their Nursing/Social Work/MD discharge note in the patient portal.  If needed, please contact [facility], 5N, 24/7 days a week and speak with Kimberlee Miramontes RN Nurse manager or any 5N staff member.  Please return to the ED if symptoms worsen or return. Pt has an in person aftercare appointment on 2/19/25 from 12:30 PM to 2:30 PM with Evelina MONIQUE at [facility].     Please contact Dr. Jed Scott for medication or treatment questions, lab results or any other concerns at [phone]. Handoff provided to your outpatient provider, [facility].  Patient has agreed to receive a copy of their Nursing/Social Work/MD discharge note in the patient portal.  If needed, please contact [facility], 5N, 24/7 days a week and speak with Kimberlee Miramontes RN Nurse manager or any  staff member.  Please return to the ED if symptoms worsen or return.

## 2025-02-18 NOTE — BH INPATIENT PSYCHIATRY DISCHARGE NOTE - NSBHASSESSSUMMFT_PSY_ALL_CORE
PF: Willingness to seek treatment, Supportive family, residential stability, positive response to treatment    RF: Hist. of medication non compliance, perceived stress at home

## 2025-02-18 NOTE — BH INPATIENT PSYCHIATRY DISCHARGE NOTE - NSBHFUPINTERVALHXFT_PSY_A_CORE
2/18/2025: Patient seen today in his room on inpatient unit. Patient reports mood as "good" with full and congruent affect. Patient reports a decrease in his stress about going back living with his father. Patient reports the "need to get rid of things for cleanliness" has continued to decrease. Patient reports feeling well on the medications and denies any side effect or adverse reaction at this time. Patient repost not wishing to take the ZYPREXA at this time. Patient reports good appetite and patient currently reports sleeping well between 6 and 8 hours a night. Continue to discuss with patient the discharge process to which patient reports the desire to follow up with [facility] outpatient for therapy and psychiatrist at this time. Patient is in agreement and demonstrates a willingness to be involved in treatment. Discussed with patients father with patients consent to which the father has no concerns and reports being willing to support the patient in whatever way he needs. Patient reports feeling better at this time. Patient is future and goal oriented in relation to his treatment and seeking help. Patient also expresses a feeling of happiness at the idea of going home in the near future. Patient continues to deny A/V hallucinations, SIIP, HIIP, and S&S of antonia at this time.

## 2025-02-18 NOTE — BH INPATIENT PSYCHIATRY PROGRESS NOTE - NSICDXBHPRIMARYDX_PSY_ALL_CORE
OCD (obsessive compulsive disorder)   F42.9  
Bipolar affective disorder, remission status unspecified   F31.9  
OCD (obsessive compulsive disorder)   F42.9  

## 2025-02-18 NOTE — BH INPATIENT PSYCHIATRY PROGRESS NOTE - NSBHMSESPEECH_PSY_A_CORE
Abnormal as indicated, otherwise normal...
Normal volume, rate, productivity, spontaneity and articulation

## 2025-02-19 RX ORDER — ESCITALOPRAM OXALATE 20 MG/1
1 TABLET ORAL
Refills: 0
Start: 2025-02-19

## 2025-02-19 RX ORDER — ESCITALOPRAM OXALATE 20 MG/1
1 TABLET ORAL
Qty: 15 | Refills: 0
Start: 2025-02-19 | End: 2025-03-05

## 2025-02-19 NOTE — CHART NOTE - NSCHARTNOTEFT_GEN_A_CORE
Follow Up Call attempt was made. However, phone did not ring and stated the phone was either changed, disconnected, or no longer in service. Follow up with discharge plan.

## 2025-02-22 DIAGNOSIS — F90.9 ATTENTION-DEFICIT HYPERACTIVITY DISORDER, UNSPECIFIED TYPE: ICD-10-CM

## 2025-02-22 DIAGNOSIS — F42.9 OBSESSIVE-COMPULSIVE DISORDER, UNSPECIFIED: ICD-10-CM

## 2025-02-22 DIAGNOSIS — N17.9 ACUTE KIDNEY FAILURE, UNSPECIFIED: ICD-10-CM

## 2025-02-22 DIAGNOSIS — F41.0 PANIC DISORDER [EPISODIC PAROXYSMAL ANXIETY]: ICD-10-CM

## 2025-05-20 ENCOUNTER — INPATIENT (INPATIENT)
Facility: HOSPITAL | Age: 30
LOS: 7 days | Discharge: HOME | DRG: 753 | End: 2025-05-28
Attending: PSYCHIATRY & NEUROLOGY | Admitting: PSYCHIATRY & NEUROLOGY
Payer: MEDICAID

## 2025-05-20 VITALS
SYSTOLIC BLOOD PRESSURE: 129 MMHG | DIASTOLIC BLOOD PRESSURE: 74 MMHG | HEART RATE: 89 BPM | WEIGHT: 149.91 LBS | OXYGEN SATURATION: 100 % | TEMPERATURE: 98 F | RESPIRATION RATE: 20 BRPM

## 2025-05-20 LAB
ALBUMIN SERPL ELPH-MCNC: 4 G/DL — SIGNIFICANT CHANGE UP (ref 3.3–5)
ALP SERPL-CCNC: 48 U/L — SIGNIFICANT CHANGE UP (ref 40–120)
ALT FLD-CCNC: 28 U/L — SIGNIFICANT CHANGE UP (ref 12–78)
AMPHET UR-MCNC: NEGATIVE — SIGNIFICANT CHANGE UP
ANION GAP SERPL CALC-SCNC: 6 MMOL/L — SIGNIFICANT CHANGE UP (ref 5–17)
APAP SERPL-MCNC: <2 UG/ML — LOW (ref 10–30)
APPEARANCE UR: CLEAR — SIGNIFICANT CHANGE UP
AST SERPL-CCNC: 18 U/L — SIGNIFICANT CHANGE UP (ref 15–37)
BARBITURATES UR SCN-MCNC: NEGATIVE — SIGNIFICANT CHANGE UP
BASOPHILS # BLD AUTO: 0.06 K/UL — SIGNIFICANT CHANGE UP (ref 0–0.2)
BASOPHILS NFR BLD AUTO: 0.7 % — SIGNIFICANT CHANGE UP (ref 0–2)
BENZODIAZ UR-MCNC: NEGATIVE — SIGNIFICANT CHANGE UP
BILIRUB SERPL-MCNC: 0.3 MG/DL — SIGNIFICANT CHANGE UP (ref 0.2–1.2)
BILIRUB UR-MCNC: NEGATIVE — SIGNIFICANT CHANGE UP
BUN SERPL-MCNC: 31 MG/DL — HIGH (ref 7–23)
CALCIUM SERPL-MCNC: 9.2 MG/DL — SIGNIFICANT CHANGE UP (ref 8.5–10.1)
CHLORIDE SERPL-SCNC: 109 MMOL/L — HIGH (ref 96–108)
CO2 SERPL-SCNC: 23 MMOL/L — SIGNIFICANT CHANGE UP (ref 22–31)
COCAINE METAB.OTHER UR-MCNC: NEGATIVE — SIGNIFICANT CHANGE UP
COLOR SPEC: YELLOW — SIGNIFICANT CHANGE UP
CREAT SERPL-MCNC: 1.03 MG/DL — SIGNIFICANT CHANGE UP (ref 0.5–1.3)
DIFF PNL FLD: NEGATIVE — SIGNIFICANT CHANGE UP
EGFR: 100 ML/MIN/1.73M2 — SIGNIFICANT CHANGE UP
EGFR: 100 ML/MIN/1.73M2 — SIGNIFICANT CHANGE UP
EOSINOPHIL # BLD AUTO: 0.05 K/UL — SIGNIFICANT CHANGE UP (ref 0–0.5)
EOSINOPHIL NFR BLD AUTO: 0.6 % — SIGNIFICANT CHANGE UP (ref 0–6)
ETHANOL SERPL-MCNC: <10 MG/DL — SIGNIFICANT CHANGE UP (ref 0–10)
FENTANYL UR QL SCN: NEGATIVE — SIGNIFICANT CHANGE UP
FLUAV AG NPH QL: SIGNIFICANT CHANGE UP
FLUBV AG NPH QL: SIGNIFICANT CHANGE UP
GLUCOSE SERPL-MCNC: 101 MG/DL — HIGH (ref 70–99)
GLUCOSE UR QL: NEGATIVE MG/DL — SIGNIFICANT CHANGE UP
HCT VFR BLD CALC: 44.5 % — SIGNIFICANT CHANGE UP (ref 39–50)
HGB BLD-MCNC: 15 G/DL — SIGNIFICANT CHANGE UP (ref 13–17)
IMM GRANULOCYTES # BLD AUTO: 0.02 K/UL — SIGNIFICANT CHANGE UP (ref 0–0.07)
IMM GRANULOCYTES NFR BLD AUTO: 0.2 % — SIGNIFICANT CHANGE UP (ref 0–0.9)
KETONES UR QL: ABNORMAL MG/DL
LEUKOCYTE ESTERASE UR-ACNC: NEGATIVE — SIGNIFICANT CHANGE UP
LYMPHOCYTES # BLD AUTO: 1.36 K/UL — SIGNIFICANT CHANGE UP (ref 1–3.3)
LYMPHOCYTES NFR BLD AUTO: 16.2 % — SIGNIFICANT CHANGE UP (ref 13–44)
MCHC RBC-ENTMCNC: 30.4 PG — SIGNIFICANT CHANGE UP (ref 27–34)
MCHC RBC-ENTMCNC: 33.7 G/DL — SIGNIFICANT CHANGE UP (ref 32–36)
MCV RBC AUTO: 90.1 FL — SIGNIFICANT CHANGE UP (ref 80–100)
METHADONE UR-MCNC: NEGATIVE — SIGNIFICANT CHANGE UP
MONOCYTES # BLD AUTO: 0.46 K/UL — SIGNIFICANT CHANGE UP (ref 0–0.9)
MONOCYTES NFR BLD AUTO: 5.5 % — SIGNIFICANT CHANGE UP (ref 2–14)
NEUTROPHILS # BLD AUTO: 6.43 K/UL — SIGNIFICANT CHANGE UP (ref 1.8–7.4)
NEUTROPHILS NFR BLD AUTO: 76.8 % — SIGNIFICANT CHANGE UP (ref 43–77)
NITRITE UR-MCNC: NEGATIVE — SIGNIFICANT CHANGE UP
NRBC # BLD AUTO: 0 K/UL — SIGNIFICANT CHANGE UP (ref 0–0)
NRBC # FLD: 0 K/UL — SIGNIFICANT CHANGE UP (ref 0–0)
NRBC BLD AUTO-RTO: 0 /100 WBCS — SIGNIFICANT CHANGE UP (ref 0–0)
OPIATES UR-MCNC: NEGATIVE — SIGNIFICANT CHANGE UP
PCP SPEC-MCNC: SIGNIFICANT CHANGE UP
PCP UR-MCNC: NEGATIVE — SIGNIFICANT CHANGE UP
PH UR: 6 — SIGNIFICANT CHANGE UP (ref 5–8)
PLATELET # BLD AUTO: 240 K/UL — SIGNIFICANT CHANGE UP (ref 150–400)
PMV BLD: 9.8 FL — SIGNIFICANT CHANGE UP (ref 7–13)
POTASSIUM SERPL-MCNC: 3.9 MMOL/L — SIGNIFICANT CHANGE UP (ref 3.5–5.3)
POTASSIUM SERPL-SCNC: 3.9 MMOL/L — SIGNIFICANT CHANGE UP (ref 3.5–5.3)
PROT SERPL-MCNC: 7.3 GM/DL — SIGNIFICANT CHANGE UP (ref 6–8.3)
PROT UR-MCNC: SIGNIFICANT CHANGE UP MG/DL
RBC # BLD: 4.94 M/UL — SIGNIFICANT CHANGE UP (ref 4.2–5.8)
RBC # FLD: 12.5 % — SIGNIFICANT CHANGE UP (ref 10.3–14.5)
RSV RNA NPH QL NAA+NON-PROBE: SIGNIFICANT CHANGE UP
SALICYLATES SERPL-MCNC: <1.7 MG/DL — LOW (ref 2.8–20)
SARS-COV-2 RNA SPEC QL NAA+PROBE: SIGNIFICANT CHANGE UP
SODIUM SERPL-SCNC: 138 MMOL/L — SIGNIFICANT CHANGE UP (ref 135–145)
SOURCE RESPIRATORY: SIGNIFICANT CHANGE UP
SP GR SPEC: 1.02 — SIGNIFICANT CHANGE UP (ref 1–1.03)
THC UR QL: NEGATIVE — SIGNIFICANT CHANGE UP
TSH SERPL-MCNC: 1.78 UU/ML — SIGNIFICANT CHANGE UP (ref 0.34–4.82)
UROBILINOGEN FLD QL: 1 MG/DL — SIGNIFICANT CHANGE UP (ref 0.2–1)
WBC # BLD: 8.38 K/UL — SIGNIFICANT CHANGE UP (ref 3.8–10.5)
WBC # FLD AUTO: 8.38 K/UL — SIGNIFICANT CHANGE UP (ref 3.8–10.5)

## 2025-05-20 PROCEDURE — 93010 ELECTROCARDIOGRAM REPORT: CPT

## 2025-05-20 PROCEDURE — 99285 EMERGENCY DEPT VISIT HI MDM: CPT

## 2025-05-20 RX ORDER — LORAZEPAM 4 MG/ML
1 VIAL (ML) INJECTION ONCE
Refills: 0 | Status: DISCONTINUED | OUTPATIENT
Start: 2025-05-20 | End: 2025-05-20

## 2025-05-20 NOTE — ED BEHAVIORAL HEALTH ASSESSMENT NOTE - DESCRIPTION
Denies See ED documentation    I-Stop checked(Reference #: [id number]):    PDI	My Rx	Current Rx	Drug Type	Rx Written	Rx Dispensed	Drug	Quantity	Days Supply	Prescriber Name	Prescriber CARLY #	Payment Method	Dispenser  A	N	N	S	09/23/2024	09/23/2024	dextroamp-amphetamin 20 mg tab	30	30	Annita Magaña	[id number]	Insurance	[facility] [id number]  A	N	N	S	07/30/2024	07/30/2024	dextroamp-amphet er 30 mg cap	30	30	Annita Magaña	[id number]	Insurance	[facility] [id number]  A	N	N	S	07/30/2024	07/30/2024	dextroamp-amphetamin 20 mg tab	30	30	Annita Magaña	[id number]	Insurance	[facility] [id number]  A	N	N	S	06/17/2024	06/22/2024	dextroamp-amphetamin 20 mg tab	30	30	Rebecca Vinson MD	[id number]	Insurance	[facility] [id number]  A	N	N	S	06/17/2024	06/21/2024	dextroamp-amphet er 30 mg cap	30	30	Rebecca Vinson MD	[id number]	Insurance	[facility] [id number] See HPI

## 2025-05-20 NOTE — ED PROVIDER NOTE - OBJECTIVE STATEMENT
29 year old male with PMHx of ADHD, prior Adderall usage discontinued 6 months ago, anxiety and depression, prescribed Ativan 1 mg up to 2x a day presents to ED with father at bedside c/o increased severe anxiety and panic attacks, taking ativan with partial alleviation. patient reports having SI despite taking his psych medications as prescribed. patient states he has been having frequent panic attacks, with associated outbursts of yelling and mood instability for the past 4-5 months. patient states in March he went to [place] for 2 months to stay with his mother and was unable to find a suitable job, and states that he did not feel well there as he was unable to "start a new life". patient states he returned to [place] and was still unable to find a job which made him feel worse. father states that the patient has made quick rash decisions throwing away new objects for no reason. patient states he has been able to sleep at night and eat normally. patient had a  psych admit during 2/25 for OCD. patient states at that time he began to feel improved however his symptoms have returned. patient denies smoking or EtOH at present, prior cannabis and EtOH consumption. denies AVH. + vague SI without a plan. patients father Steve can be reached at: [phone] 29 M, PMH: ADHD, prior Adderall usage discontinued 6 months ago, anxiety and depression, prescribed Ativan 1 mg up to 2x a day presents ambulatory to ED with father at bedside c/o increased severe anxiety and panic attacks, taking Ativan with partial alleviation. Patient reports having SI despite taking his psych medications as prescribed. +Frequent panic attacks, with associated outbursts of yelling and mood instability for the past 4-5 months. In March he went to [place] for 2 months to stay with his mother but was unable to find a suitable job, and states that he did not feel well there as he was unable to "start a new life". Patient states he returned to [place] and was still unable to find a job which made him feel worse. Father states that the patient has made multiple quick rash decisions incl: throwing away new objects for no reason. patient states he has been able to sleep at night and eat normally.  Psych admit during 2/25 for OCD. patient states at that time he began to feel improved however his symptoms have returned. Denies smoking or EtOH at present, prior cannabis and EtOH consumption. denies AVH. + vague SI without a plan. Patients father Steve can be reached at: [phone]

## 2025-05-20 NOTE — ED ADULT NURSE REASSESSMENT NOTE - NS ED NURSE REASSESS COMMENT FT1
Obtained bedside report from HERNANDEZ Guerin at 19:00. Pt resting comfortably in bed, breathing even and unlabored on room air in semi-mendez's position. Continuous 1:1 in place. Pt has no complaints or concerns at this time. VS as charted. POC updated with pt. Pt calm and cooperative at this time. Safety and comfort measures maintained.

## 2025-05-20 NOTE — ED ADULT TRIAGE NOTE - CHIEF COMPLAINT QUOTE
Patient presents to the ER with complaints of severe anxiety and panic attacks. Patient reports recent trip 2 weeks ago and was hospitalized there. Patient reports unemployment is a trigger amongst living at home. Patient takes ativan which he states helps but has thoughts of wanting to hurt himself, denies a plan or past attempt. Constant observation initiated.

## 2025-05-20 NOTE — ED BEHAVIORAL HEALTH ASSESSMENT NOTE - DETAILS
Deferred Contact info unavailable Reports having been hospitalized last month in [place] TBD See HPI Self-referred

## 2025-05-20 NOTE — ED PROVIDER NOTE - CLINICAL SUMMARY MEDICAL DECISION MAKING FREE TEXT BOX
29 year old male with PMHx of ADHD, prior Adderall usage discontinued 6 months ago  anxiety and depression, prescribed Ativan 1 mg up to 2x a day presents to ED with father at bedside c/o increased severe anxiety and panic attacks, taking ativan with partial alleviation. patient reports having SI despite taking his psych medications as prescribed. patient states he has been having frequent panic attacks, with associated outbursts of yelling and mood instability for the past 4-5 months.  plan: 1:1 observation, psych labs, psych consult and reassess. 29 year old male with PMHx of ADHD, prior Adderall usage discontinued 6 months ago  anxiety and depression, prescribed Ativan 1 mg up to 2x a day presents to ED with father at bedside c/o increased severe anxiety and panic attacks, taking ativan with partial alleviation. patient reports having SI despite taking his psych medications as prescribed. patient states he has been having frequent panic attacks, with associated outbursts of yelling and mood instability for the past 4-5 months.  plan: 1:1 observation, psych labs, psych consult and reassess.    19:00, CC:  Labs results not actionable.  Tele-Psychiatry aware of ED consult. 29 year old male with PMHx of ADHD, prior Adderall usage discontinued 6 months ago  anxiety and depression, prescribed Ativan 1 mg up to 2x a day presents to ED with father at bedside c/o increased severe anxiety and panic attacks, taking ativan with partial alleviation. patient reports having SI despite taking his psych medications as prescribed. patient states he has been having frequent panic attacks, with associated outbursts of yelling and mood instability for the past 4-5 months.  plan: 1:1 observation, psych labs, psych consult and reassess.    19:00, CC:  Labs results not actionable.  Tele-Psychiatry aware of ED consult.    CC:   Labs results unremarkable.  Patient's anxiety & pacing in his room + improved after Ativan 1 mg p.o. administered in ED.  Informed by Tele-Psychiatry that patient is agreeable for voluntary psych admission to 5 N. However, no such beds available tonight.  Patient to be held overnight for Psych reassessment, expected voluntary Psych admission to 5 N. at that time.   If overnight patient refuses to wait and wants to leave and be discharged, patient cannot just leave on his own, he requires telepsych re-evaluation before he can leave. 29 M, PMH: ADHD, prior Adderall usage discontinued 6 months ago  anxiety and depression, prescribed Ativan 1 mg up to 2x a day; ambulatory to ED with father at bedside c/o increased severe anxiety and panic attacks, taking Ativan with partial but inadequate alleviation. +SI despite taking his psych medications as prescribed. Patient states he has been having frequent panic attacks, with associated outbursts of yelling and mood instability for the past 4-5 months.  Plan: 1:1 observation, psych labs, Psych consult and reassess.    19:00, CC:  Labs results not actionable.  Tele-Psychiatry aware of ED consult.    CC:   Labs results unremarkable.  Patient's anxiety & pacing in his room + improved after Ativan 1 mg p.o. administered in ED.  Informed by Tele-Psychiatry that patient is agreeable for voluntary Psych admission to 5 N. However, no such beds available tonight.  Patient to be held overnight for Psych reassessment in AM, expected voluntary Psych admission to 5 N. at that time.   Instructed by Tele-Psychiatry that, if overnight patient refuses to wait until morning reassessment and wants to leave and be discharged, patient cannot just leave on his own, he requires Tele-Psych re-evaluation before he can leave.

## 2025-05-20 NOTE — ED PROVIDER NOTE - PHYSICAL EXAMINATION
Gen: white male adult. No respiratory distress. awake alert, anxious appearing, non toxic.   Head: NC/AT  Eyes: PERRL, EOMI  ENT: oropharynx clear, mucous membranes mildly dry  Card: regular rate and rhythm, normal radial pulse  Resp: Breath sounds clear bilaterally, respirations normal  : deferred no CVAT   Abd: soft, non-tender + bowel sounds, no rebound, guarding or mass   Msk: MAEx4 without focal deformities, tenderness or swelling  Skin: no tactile warmth, no rash, no track marks.   Neuro: Alert and oriented, no focal deficits, no motor or sensory deficits  Psych: cooperative, anxious. + vague SI with no plan, no A/V hallucinations. Gen: white male adult. No respiratory distress. awake alert, anxious appearing, non toxic.   Head: NC/AT  Eyes: PERRL, EOMI  ENT: oropharynx clear, mucous membranes mildly dry  Card: regular rate and rhythm, normal radial pulse  Resp: Breath sounds clear bilaterally, respirations normal  : deferred no CVAT   Abd: soft, non-tender + bowel sounds, no rebound, guarding or mass   Msk: MAEx4 without focal deformities, tenderness or swelling  Skin: no tactile warmth, no rash, no track marks.   Neuro: Alert and oriented, no focal deficits, no motor or sensory deficits, CNs intact, normal speech  Psych: cooperative, anxious. + vague SI with no plan, no A/V hallucinations.

## 2025-05-20 NOTE — ED BEHAVIORAL HEALTH ASSESSMENT NOTE - PSYCHIATRIC ISSUES AND PLAN (INCLUDE STANDING AND PRN MEDICATION)
For agitation, can offer PO/IM Haldol 5 mg/Ativan 2 mg q6H PRN. Hold antipsychotics if QTC>500. Will defer decision re standing meds to inpatient team

## 2025-05-20 NOTE — ED PROVIDER NOTE - CARE PLAN
Pt currently admitted to 639 OBS status. Expected discharge today with EGD outpatient 7/20/20. PAT call completed with pt, instructions provided. Copy of instructions sent to unit to be given to pt. Preop COVID test to be obtained prior to DC today. Principal Discharge DX:	Mood disorder  Secondary Diagnosis:	OCD (obsessive compulsive disorder)   1

## 2025-05-20 NOTE — ED BEHAVIORAL HEALTH ASSESSMENT NOTE - HPI (INCLUDE ILLNESS QUALITY, SEVERITY, DURATION, TIMING, CONTEXT, MODIFYING FACTORS, ASSOCIATED SIGNS AND SYMPTOMS)
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     Chart review indicates the pt was recently living in [place] for two months with his mother, where he was unable to attain work or set up a life for himself, and subsequently returned to [place]. The pt has reportedly been experiencing severe anxiety with panic attacks, has been more labile, and has had SI without a clear plan. Collateral obtained from father also indicates the pt's been making more impulsive/rash decisions and throwing out items at home.     On this writer's assessment, the pt is A&O x4, presents as anxious and constricted, with no e/o internal preoccupation, and states he came to the hospital seeking admission to restart medication to target his recent anxiety and mood symptoms. He elaborates stating "I've been yelling an screaming", states he's "not able to function", has been engaging in self harm behaviors by hitting himself, and has had recurrent active SI, but no plan/intent. The pt states he was discharged from the hospital last month on Ativan, but currently has no psychiatric care set up in [place] and is interested in inpatient admission. On ROS, the pt denies recent HI, A/VH, PI, aggression, or substance use.    [facility] called father for collateral, but was unable to reach him.

## 2025-05-20 NOTE — ED ADULT NURSE NOTE - NSFALLUNIVINTERV_ED_ALL_ED
Bed/Stretcher in lowest position, wheels locked, appropriate side rails in place/Call bell, personal items and telephone in reach/Instruct patient to call for assistance before getting out of bed/chair/stretcher/Non-slip footwear applied when patient is off stretcher/[place] to call system/Physically safe environment - no spills, clutter or unnecessary equipment/Purposeful proactive rounding/Room/bathroom lighting operational, light cord in reach

## 2025-05-20 NOTE — ED BEHAVIORAL HEALTH ASSESSMENT NOTE - NSSUICPROTFACT_PSY_ALL_CORE
Identifies reasons for living/Supportive social network of family or friends/Fear of death or the actual act of killing self/Other

## 2025-05-20 NOTE — ED ADULT NURSE REASSESSMENT NOTE - NS ED NURSE REASSESS COMMENT FT1
Pt on continuous 1:1 awaiting to speak with telepsych. Pt reports feeling anxious. Pt is seeking pacing in room, as per 1:1 at bedside. Pt able to be redirectable to sit down on stretcher. Pt denies SI/HI/AH/VH at this time. MD Moon made aware and states he will order Ativan. Safety and comfort measures maintained.

## 2025-05-20 NOTE — ED ADULT NURSE REASSESSMENT NOTE - NS ED NURSE REASSESS COMMENT FT1
Pt offered ordered Ativan for pt's anxiety. Pt declines and states he does not feel anxious at this time and meeting with telepsych helped with symptoms. Pt calm and cooperative at this time. Safety and comfort measures maintained.

## 2025-05-20 NOTE — ED BEHAVIORAL HEALTH ASSESSMENT NOTE - SUMMARY
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, denies PMH, with PPHx of OCD, ADHD, Panic Disorder, unspecified mood/psychotic disorder, prior psych admissions, reports he was discharged last month from a hospital in [place] and before that was at  2/7-2/18/25, denies pSA, +hx of SIB by hitting self(last did a weeks ago), denies hx of overt aggression, but father did have an OOP against him due to throwing out father's belongings, denies active substance use, not currently connected to care or on meds, who self-presents to the ED accompanied by father for recent mood and anxiety symptoms, including reported panic symptoms, mood lability, recurrent active SI, self-harm behaviors, impulsivity, and hopelessness. He currently poses an elevated risk of harm due to these symptoms, his recent inpatient admission, and current lack of connection to care, and is appropriate for inpatient hospitalization for safety, psychiatric stabilization, and appropriate aftercare planning.

## 2025-05-21 DIAGNOSIS — F39 UNSPECIFIED MOOD [AFFECTIVE] DISORDER: ICD-10-CM

## 2025-05-21 LAB
A1C WITH ESTIMATED AVERAGE GLUCOSE RESULT: 5.2 % — SIGNIFICANT CHANGE UP (ref 4–5.6)
ESTIMATED AVERAGE GLUCOSE: 103 MG/DL — SIGNIFICANT CHANGE UP (ref 68–114)
TSH SERPL-MCNC: 1.33 UU/ML — SIGNIFICANT CHANGE UP (ref 0.34–4.82)

## 2025-05-21 PROCEDURE — 80061 LIPID PANEL: CPT

## 2025-05-21 PROCEDURE — 83036 HEMOGLOBIN GLYCOSYLATED A1C: CPT

## 2025-05-21 PROCEDURE — 93005 ELECTROCARDIOGRAM TRACING: CPT

## 2025-05-21 PROCEDURE — 36415 COLL VENOUS BLD VENIPUNCTURE: CPT

## 2025-05-21 PROCEDURE — 84443 ASSAY THYROID STIM HORMONE: CPT

## 2025-05-21 RX ORDER — LORAZEPAM 4 MG/ML
1 VIAL (ML) INJECTION EVERY 4 HOURS
Refills: 0 | Status: DISCONTINUED | OUTPATIENT
Start: 2025-05-21 | End: 2025-05-22

## 2025-05-21 RX ORDER — ACETAMINOPHEN 500 MG/5ML
650 LIQUID (ML) ORAL EVERY 6 HOURS
Refills: 0 | Status: DISCONTINUED | OUTPATIENT
Start: 2025-05-21 | End: 2025-05-28

## 2025-05-21 RX ORDER — LORAZEPAM 4 MG/ML
1 VIAL (ML) INJECTION ONCE
Refills: 0 | Status: DISCONTINUED | OUTPATIENT
Start: 2025-05-21 | End: 2025-05-21

## 2025-05-21 RX ORDER — MAGNESIUM, ALUMINUM HYDROXIDE 200-200 MG
30 TABLET,CHEWABLE ORAL EVERY 6 HOURS
Refills: 0 | Status: DISCONTINUED | OUTPATIENT
Start: 2025-05-21 | End: 2025-05-28

## 2025-05-21 RX ORDER — HALOPERIDOL 10 MG/1
5 TABLET ORAL ONCE
Refills: 0 | Status: COMPLETED | OUTPATIENT
Start: 2025-05-21 | End: 2026-04-19

## 2025-05-21 RX ORDER — LORAZEPAM 4 MG/ML
1 VIAL (ML) INJECTION ONCE
Refills: 0 | Status: DISCONTINUED | OUTPATIENT
Start: 2025-05-21 | End: 2025-05-22

## 2025-05-21 RX ORDER — DIPHENHYDRAMINE HCL 12.5MG/5ML
2 ELIXIR ORAL ONCE
Refills: 0 | Status: COMPLETED | OUTPATIENT
Start: 2025-05-21 | End: 2026-04-19

## 2025-05-21 RX ORDER — CYST/ALA/Q10/PHOS.SER/DHA/BROC 100-20-50
1 POWDER (GRAM) ORAL DAILY
Refills: 0 | Status: DISCONTINUED | OUTPATIENT
Start: 2025-05-21 | End: 2025-05-23

## 2025-05-21 RX ORDER — MAGNESIUM HYDROXIDE 400 MG/5ML
30 SUSPENSION ORAL DAILY
Refills: 0 | Status: DISCONTINUED | OUTPATIENT
Start: 2025-05-21 | End: 2025-05-28

## 2025-05-21 RX ADMIN — Medication 1 MILLIGRAM(S): at 10:28

## 2025-05-21 NOTE — ED BEHAVIORAL HEALTH PROGRESS NOTE - SUMMARY
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, denies PMH, with PPHx of OCD, ADHD, Panic Disorder, unspecified mood/psychotic disorder, prior psych admissions, reports he was discharged last month from a hospital in [place] and before that was at  2/7-2/18/25, denies pSA, +hx of SIB by hitting self(last did a weeks ago), denies hx of overt aggression, but father did have an OOP against him due to throwing out father's belongings, denies active substance use, not currently connected to care or on meds, who self-presents to the ED accompanied by father for recent mood and anxiety symptoms, including reported panic symptoms, mood lability, recurrent active SI, self-harm behaviors, impulsivity, and hopelessness. He currently poses an elevated risk of harm due to these symptoms, his recent inpatient admission, and current lack of connection to care, and is appropriate for inpatient hospitalization for safety, psychiatric stabilization, and appropriate aftercare planning."    At this time I agrre with prior assessment patient to be admitted voluntary 9.13

## 2025-05-21 NOTE — ED BEHAVIORAL HEALTH PROGRESS NOTE - DETAILS:
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     Chart review indicates the pt was recently living in [place] for two months with his mother, where he was unable to attain work or set up a life for himself, and subsequently returned to [place]. The pt has reportedly been experiencing severe anxiety with panic attacks, has been more labile, and has had SI without a clear plan. Collateral obtained from father also indicates the pt's been making more impulsive/rash decisions and throwing out items at home.     On this writer's assessment, the pt is A&O x4, presents as anxious and constricted, with no e/o internal preoccupation, and states he came to the hospital seeking admission to restart medication to target his recent anxiety and mood symptoms. He elaborates stating "I've been yelling an screaming", states he's "not able to function", has been engaging in self harm behaviors by hitting himself, and has had recurrent active SI, but no plan/intent. The pt states he was discharged from the hospital last month on Ativan, but currently has no psychiatric care set up in [place] and is interested in inpatient admission. On ROS, the pt denies recent HI, A/VH, PI, aggression, or substance use.    [facility] called father for collateral, but was unable to reach him."    At this time patient ia labile on and off crying as the patient reports being very anxious at this time discussing medication patient agreed to take PO ativan 1mg. Patient is also continue ing that he would like to be admitted at this [facility] and is in agreement to fill out 9.13 for voluntary admit.

## 2025-05-21 NOTE — BH PATIENT PROFILE - FALL HARM RISK - UNIVERSAL INTERVENTIONS
Instruct patient to call for assistance before getting out of bed or chair/Physically safe environment - no spills, clutter or unnecessary equipment/Purposeful Proactive Rounding

## 2025-05-21 NOTE — ED BEHAVIORAL HEALTH PROGRESS NOTE - NSBHATTESTTYPEVISIT_PSY_A_CORE
On-site Attending supervising VIRAJ (99XXX codes) Attending evaluating patient with VIRAJ (50860/05520 code)

## 2025-05-21 NOTE — ED BEHAVIORAL HEALTH PROGRESS NOTE - BILLING CODES
99283-Emergency department visit - moderate complexity, non-urgent evaluation 99222-Initial hospital care - moderate complexity

## 2025-05-21 NOTE — BH PATIENT PROFILE - INFORMATION PROVIDED TO:
Signed Prescriptions:                        Disp   Refills    hydrOXYzine (ATARAX) 25 MG tablet          32 tab*0        Sig: Take 1-2 tablets (25-50 mg) by mouth every 6 hours as           needed for anxiety or other (adjuvant pain)  Authorizing Provider: JOSE CHESTER    4 day supply given so patient does not run out over the weekend. Will need to call patient on Monday to clarify how she is using this medication.    Jose Chester MD  [facility]    patient

## 2025-05-21 NOTE — BH PATIENT PROFILE - NSSBIRTINJURYRES_GEN_A_CORE
"BREASTFEEDING TIPS  1. Breastfeed every 2-4 hours. If your baby is sleepy - use breast compression, push on chin to \"start up\" baby, switch breasts, undress to diaper and wake before relatching.   Some babies \"cluster\" feed every 1 hour for a while- this is normal. Feed your baby whenever he/she is awake-  even if every hour for a while. This frequent feeding will help you make more milk and encourage your baby to sleep for longer stretches later in the evening or night.    - Position your baby close to you with pillows so he/she is facing you -belly to belly laying horizontally across your lap at the level of your breast and looking a bit \"upwards\" to your breast   -One hand holds the baby's neck behind the ears and the other hand holds your breast  -Baby's nose should start out pointing to your nipple before latching  - Hold your breast in a \"sandwich\" position by gently squeezing your breast in an oval shape and make sure your hands are not covering the areola  This \"nipple sandwich\" will make it easier for your breast to fit inside the baby's mouth-making latching more comfortable for you and baby and preventing sore nipples. Your baby should take a \"mouthful\" of breast!  - You may want to use hand expression to \"prime the pump\" and get a drip of milk out on your nipple to wake baby   (see website: newborns.[employer].edu/Breastfeeding/HandExpression.html)  - Swipe your nipple on baby's upper lip and wait for a BIG open mouth  - YOU bring baby to the breast (hold baby's neck with your fingers just below the ears) and bring baby's head to the breast--leading with the chin.  Try to avoid pushing your breast into baby's mouth- bring baby to you instead!  - Aim to get your baby's bottom lip LOW DOWN ON AREOLA (baby's upper lip just needs to \"clear\" the nipple) .   Your baby should latch onto the areola and NOT just the nipple. That way your baby gets more milk and you don't get sore nipples!      Useful web " sites:  Www.infantrisk.com  Www.aap.org  Www.ibreastfeeding.com  Www.[email or web address].[email or web address]   No

## 2025-05-21 NOTE — ED ADULT NURSE REASSESSMENT NOTE - NS ED NURSE REASSESS COMMENT FT1
Pt calm and cooperative at this time. Continuous 1:1 in place. Safety and comfort measures maintained.

## 2025-05-21 NOTE — ED ADULT NURSE REASSESSMENT NOTE - NS ED NURSE REASSESS COMMENT FT1
Pt is calm, cooperative. Pt has no complaints at this time. Pt does not appear in discomfort or distress at this time. Pt denies feeling anxious. SI/HI, auditory, visual, and tactile disturbances. Pt ambulated to bathroom. 1:1 in place. Safety and comfort maintained.

## 2025-05-21 NOTE — ED BEHAVIORAL HEALTH PROGRESS NOTE - NSBHATTESTAPPAMEND_PSY_A_CORE
I have personally seen and examined this patient. I fully participated in the care of this patient. I have made amendments to the documentation where appropriate and otherwise agree with the history, physical exam, and plan as documented by the VIRAJ

## 2025-05-21 NOTE — ED BEHAVIORAL HEALTH PROGRESS NOTE - CASE SUMMARY/FORMULATION (CLEARLY DOCUMENT RATIONALE FOR DISPOSITION CHANGE)
Patient to be admitted to N due to patient elevated anxiety and mood liability. Patient is requesting to be voluntary admitted at this time.

## 2025-05-22 LAB
CHOLEST SERPL-MCNC: 195 MG/DL — SIGNIFICANT CHANGE UP
HDLC SERPL-MCNC: 119 MG/DL — SIGNIFICANT CHANGE UP
LDLC SERPL-MCNC: 63 MG/DL — SIGNIFICANT CHANGE UP
LIPID PNL WITH DIRECT LDL SERPL: 63 MG/DL — SIGNIFICANT CHANGE UP
NONHDLC SERPL-MCNC: 76 MG/DL — SIGNIFICANT CHANGE UP
TRIGL SERPL-MCNC: 67 MG/DL — SIGNIFICANT CHANGE UP

## 2025-05-22 PROCEDURE — 93010 ELECTROCARDIOGRAM REPORT: CPT

## 2025-05-22 PROCEDURE — 99232 SBSQ HOSP IP/OBS MODERATE 35: CPT

## 2025-05-22 RX ORDER — LORAZEPAM 4 MG/ML
1 VIAL (ML) INJECTION EVERY 6 HOURS
Refills: 0 | Status: DISCONTINUED | OUTPATIENT
Start: 2025-05-22 | End: 2025-05-28

## 2025-05-22 RX ORDER — CARIPRAZINE 3 MG/1
1.5 CAPSULE, GELATIN COATED ORAL AT BEDTIME
Refills: 0 | Status: DISCONTINUED | OUTPATIENT
Start: 2025-05-23 | End: 2025-05-27

## 2025-05-22 RX ORDER — DIPHENHYDRAMINE HCL 12.5MG/5ML
50 ELIXIR ORAL ONCE
Refills: 0 | Status: COMPLETED | OUTPATIENT
Start: 2025-05-22 | End: 2025-05-23

## 2025-05-22 RX ORDER — HALOPERIDOL 10 MG/1
5 TABLET ORAL ONCE
Refills: 0 | Status: COMPLETED | OUTPATIENT
Start: 2025-05-22 | End: 2025-05-23

## 2025-05-22 RX ORDER — HALOPERIDOL 10 MG/1
5 TABLET ORAL EVERY 6 HOURS
Refills: 0 | Status: DISCONTINUED | OUTPATIENT
Start: 2025-05-22 | End: 2025-05-27

## 2025-05-22 RX ORDER — LORAZEPAM 4 MG/ML
2 VIAL (ML) INJECTION EVERY 6 HOURS
Refills: 0 | Status: DISCONTINUED | OUTPATIENT
Start: 2025-05-22 | End: 2025-05-23

## 2025-05-22 RX ORDER — DIPHENHYDRAMINE HCL 12.5MG/5ML
2 ELIXIR ORAL ONCE
Refills: 0 | Status: DISCONTINUED | OUTPATIENT
Start: 2025-05-22 | End: 2025-05-22

## 2025-05-22 RX ORDER — LORAZEPAM 4 MG/ML
2 VIAL (ML) INJECTION ONCE
Refills: 0 | Status: DISCONTINUED | OUTPATIENT
Start: 2025-05-22 | End: 2025-05-22

## 2025-05-22 RX ADMIN — Medication 1 MILLIGRAM(S): at 12:08

## 2025-05-22 NOTE — BH INPATIENT PSYCHIATRY ASSESSMENT NOTE - NSBHASSESSSUMMFT_PSY_ALL_CORE
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   Per ED Telepsychiatry evaluation on 5/21/25:   Chart review indicates the pt was recently living in [place] for two months with his mother, where he was unable to attain work or set up a life for himself, and subsequently returned to [place]. The pt has reportedly been experiencing severe anxiety with panic attacks, has been more labile, and has had SI without a clear plan. Collateral obtained from father also indicates the pt's been making more impulsive/rash decisions and throwing out items at home.   On this writer's assessment, the pt is A & O x4, presents as anxious and constricted, with no e/o internal preoccupation, and states he came to the hospital seeking admission to restart medication to target his recent anxiety and mood symptoms. He elaborates stating "I've been yelling an screaming", states he's "not able to function", has been engaging in self harm behaviors by hitting himself, and has had recurrent active SI, but no plan/intent. The pt states he was discharged from the hospital last month on Ativan, but currently has no psychiatric care set up in [place] and is interested in inpatient admission. On ROS, the pt denies recent HI, AH /VH, PI, aggression, or substance use.  [facility] called father for collateral, but was unable to reach him.     The pt was subsequently admitted to [address] inpatient psychiatry on 5/21/25 on a 9.13 voluntary legal status for acute pt safety and for ongoing pt assessment and treatment of his especially functionally impairing OCD. When the pt was seen by this writer on 5N today 5/22/25, the pt continued to appear very tense and brimming with inner turmoil. The pt appeared to struggle with making even the simplest of decisions and later submitted a 72 hr letter requesting his DC from hospital after just having expressed his wish to be readmitted as   above. Informed consent obtained from the pt for a trial of low dose Vraylar 1.5 mg po q am  in an effort to alleviate the pt's longstanding h/o  affective volatility and associated thought disorganization.  This writer discussed plan with the pt to continue prn low dose Ativan for anxiety as the pt chose not to begin a low dose standing Ativan regimen  in light of the pt's significant anxiety. The pt continued to struggle with even the simplest decisions but he was overall able to maintain his emotional equilibrium. He denied any current SI /HI /AH.

## 2025-05-22 NOTE — H&P ADULT - HISTORY OF PRESENT ILLNESS
29 year old male with PMHx of ADHD, prior Adderall usage discontinued 6 months ago, anxiety and depression, prescribed Ativan 1 mg up to 2x a day presents to ED with father at bedside c/o increased severe anxiety and panic attacks, taking ativan with partial alleviation. patient reports having SI despite taking his psych medications as prescribed. patient states he has been having frequent panic attacks, with associated outbursts of yelling and mood instability for the past 4-5 months. patient states in March he went to [place] for 2 months to stay with his mother and was unable to find a suitable job, and states that he did not feel well there as he was unable to "start a new life". patient states he returned to [place] and was still unable to find a job which made him feel worse. father states that the patient has made quick rash decisions throwing away new objects for no reason. patient states he has been able to sleep at night and eat normally.   Medicine service is consulted for medical management, As per patient and his father at bedside, patient has no other chronic medical problems,

## 2025-05-22 NOTE — BH INPATIENT PSYCHIATRY ASSESSMENT NOTE - NSBHCHARTREVIEWVS_PSY_A_CORE FT
Vital Signs Last 24 Hrs  T(C): 36.6 (05-22-25 @ 07:40), Max: 36.8 (05-21-25 @ 17:21)  T(F): 97.8 (05-22-25 @ 07:40), Max: 98.2 (05-21-25 @ 17:21)  HR: --  BP: --  BP(mean): --  RR: 16 (05-22-25 @ 07:40) (16 - 17)  SpO2: 100% (05-22-25 @ 07:40) (99% - 100%)    Orthostatic VS  05-22-25 @ 07:40  Lying BP: --/-- HR: --  Sitting BP: 114/72 HR: 78  Standing BP: 116/71 HR: 75  Site: upper right arm  Mode: electronic  Orthostatic VS  05-21-25 @ 17:21  Lying BP: --/-- HR: --  Sitting BP: 126/70 HR: 78  Standing BP: 126/83 HR: 89  Site: --  Mode: --

## 2025-05-22 NOTE — H&P ADULT - ASSESSMENT
A/P:    #Anxiety Disorder  #Psychosis  -admitted in Psychiatry unit  -patient will be primarily managed by the psychiatry team    #DVT ppx  -encourage ambulation    We will sign off, ruben, us back with any question.

## 2025-05-22 NOTE — BH INPATIENT PSYCHIATRY ASSESSMENT NOTE - NSBHMETABOLIC_PSY_ALL_CORE_FT
BMI: BMI (kg/m2): 20.2 (05-21-25 @ 17:21)  HbA1c: A1C with Estimated Average Glucose Result: 5.2 % (05-21-25 @ 11:10)    Glucose:   BP: 132/78 (05-21-25 @ 15:36) (107/68 - 132/78)Vital Signs Last 24 Hrs  T(C): 36.6 (05-22-25 @ 07:40), Max: 36.8 (05-21-25 @ 17:21)  T(F): 97.8 (05-22-25 @ 07:40), Max: 98.2 (05-21-25 @ 17:21)  HR: --  BP: --  BP(mean): --  RR: 16 (05-22-25 @ 07:40) (16 - 17)  SpO2: 100% (05-22-25 @ 07:40) (99% - 100%)    Orthostatic VS  05-22-25 @ 07:40  Lying BP: --/-- HR: --  Sitting BP: 114/72 HR: 78  Standing BP: 116/71 HR: 75  Site: upper right arm  Mode: electronic  Orthostatic VS  05-21-25 @ 17:21  Lying BP: --/-- HR: --  Sitting BP: 126/70 HR: 78  Standing BP: 126/83 HR: 89  Site: --  Mode: --    Lipid Panel: Date/Time: 05-22-25 @ 09:26  Cholesterol, Serum: 195  LDL Cholesterol Calculated: 63  HDL Cholesterol, Serum: 119  Total Cholesterol/HDL Ration Measurement: --  Triglycerides, Serum: 67

## 2025-05-22 NOTE — H&P ADULT - NSHPPHYSICALEXAM_GEN_ALL_CORE
T(C): 36.6 (05-22-25 @ 07:40), Max: 36.8 (05-21-25 @ 17:21)  HR: --  BP: --  RR: 16 (05-22-25 @ 07:40) (16 - 17)  SpO2: 100% (05-22-25 @ 07:40) (99% - 100%)    CONSTITUTIONAL: Well groomed, no apparent distress  EYES: PERRLA and symmetric, EOMI, No conjunctival or scleral injection, non-icteric  ENMT: Oral mucosa with moist membranes. no pharyngeal injection or exudates             NECK: Supple, symmetric and without tracheal deviation   RESP: No respiratory distress, no use of accessory muscles; CTA b/l, no WRR  CV: RRR, +S1S2, no MRG; no JVD; no peripheral edema  GI: Soft, NT, ND, no rebound, no guarding; no palpable masses;   LYMPH: No cervical LAD or tenderness;   MSK: Normal gait; Normal ROM without pain, no spinal tenderness, normal muscle strength/tone  SKIN: No rashes or ulcers noted; no subcutaneous nodules or induration palpable  NEURO: CN II-XII intact; normal reflexes in upper and lower extremities, sensation intact in upper and lower extremities b/l to light touch   PSYCH: Appropriate insight/judgment; A+O x 3, mood and affect appropriate, recent/remote memory intact

## 2025-05-22 NOTE — BH INPATIENT PSYCHIATRY ASSESSMENT NOTE - HPI (INCLUDE ILLNESS QUALITY, SEVERITY, DURATION, TIMING, CONTEXT, MODIFYING FACTORS, ASSOCIATED SIGNS AND SYMPTOMS)
The pt is a  29 year-old WM  single, non-caregiver, domiciled with father in 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   Per ED Telepsychiatry evaluation on 5/21/25:   Chart review indicates the pt was recently living in [place] for two months with his mother, where he was unable to attain work or set up a life for himself, and subsequently returned to [place]. The pt has reportedly been experiencing severe anxiety with panic attacks, has been more labile, and has had SI without a clear plan. Collateral obtained from father also indicates the pt's been making more impulsive/rash decisions and throwing out items at home.   On this writer's assessment, the pt is A & O x4, presents as anxious and constricted, with no e/o internal preoccupation, and states he came to the hospital seeking admission to restart medication to target his recent anxiety and mood symptoms. He elaborates stating "I've been yelling an screaming", states he's "not able to function", has been engaging in self harm behaviors by hitting himself, and has had recurrent active SI, but no plan/intent. The pt states he was discharged from the hospital last month on Ativan, but currently has no psychiatric care set up in [place] and is interested in inpatient admission. On ROS, the pt denies recent HI, AH /VH, PI, aggression, or substance use.  [facility] called father for collateral, but was unable to reach him.     The pt was subsequently admitted to [address] inpatient psychiatry on 5/21/25 on a 9.13 voluntary legal status for acute pt safety and for ongoing pt assessment and treatment of his especially functionally impairing OCD. When the pt was seen by this writer on 5N today 5/22/25, the pt continued to appear very tense and brimming with inner turmoil. The pt appeared to struggle with making even the simplest of decisions and later submitted a 72 hr letter requesting his DC from hospital after just having expressed his wish to be readmitted as   above. Informed consent obtained from the pt for a trial of low dose Vraylar 1.5 mg po q am  in an effort to alleviate the pt's longstanding h/o  affective volatility and associated thought disorganization.  This writer discussed plan with the pt to continue prn low dose Ativan for anxiety as the pt chose not to begin a low dose standing Ativan regimen  in light of the pt's significant anxiety. The pt continued to struggle with even the simplest decisions but he was overall able to maintain his emotional equilibrium. He denied any current SI /HI /AH.

## 2025-05-22 NOTE — BH INPATIENT PSYCHIATRY ASSESSMENT NOTE - CURRENT MEDICATION
MEDICATIONS  (STANDING):  multivitamin/minerals 1 Tablet(s) Oral daily    MEDICATIONS  (PRN):  acetaminophen     Tablet .. 650 milliGRAM(s) Oral every 6 hours PRN Temp greater or equal to 38C (100.4F), Mild Pain (1 - 3), Moderate Pain (4 - 6)  aluminum hydroxide/magnesium hydroxide/simethicone Suspension 30 milliLiter(s) Oral every 6 hours PRN Dyspepsia  diphenhydrAMINE 50 milliGRAM(s) Oral once PRN Extrapyramidal prophylaxis  diphenhydrAMINE Injectable 50 milliGRAM(s) IntraMuscular once PRN Extrapyramidal prophylaxis  haloperidol     Tablet 5 milliGRAM(s) Oral every 6 hours PRN mild to moderate agitation due to mood disorder  haloperidol    Injectable 5 milliGRAM(s) IntraMuscular once PRN Severe agitation secondary to psychosis, antonia, or poor impulse control.  LORazepam     Tablet 1 milliGRAM(s) Oral every 6 hours PRN mild to moderate anxiety due to mood d/o  LORazepam   Injectable 2 milliGRAM(s) IntraMuscular every 6 hours PRN severe agitation due to mood disorder  magnesium hydroxide Suspension 30 milliLiter(s) Oral daily PRN Constipation

## 2025-05-22 NOTE — BH INPATIENT PSYCHIATRY ASSESSMENT NOTE - RISK ASSESSMENT
See above The pt currently, though anxious and dysphoric, presents a low risk for suicide but a moderate risk for brief impulsive aggression via SIB ( pt with h/o hitting himself or punching a wall)  Acute risk factors· functionally impairing OCD symptoms	, affective volatility ,ADHD,   · Presenting Symptoms	Depressed mood/Anhedonia, Hopelessness or despair  ·Chronic risk factors-  longstanding partially treated OCD and mood d/o due to pt inability to remain in one treatment venue for an adequate time period  · Treatment Related Factors	Non-compliant or not receiving treatment, Recent inpatient discharge  Protective factors- pt is intelligent, + residential stability, loving family  despite ongoing psychosocial turmoil  · Suicide Protective Factors (check all that apply)	Identifies reasons for living, Supportive social network of family or friends, Fear of death or the actual act of killing self,   Mitigating factors- pt aware at some level of his need for treatment to decrease the intensity of his impairing mood and anxiety symptoms, pt amenable to medication treatment to alleviate severe symptoms

## 2025-05-23 PROCEDURE — 99232 SBSQ HOSP IP/OBS MODERATE 35: CPT

## 2025-05-23 RX ORDER — HALOPERIDOL 10 MG/1
5 TABLET ORAL EVERY 6 HOURS
Refills: 0 | Status: DISCONTINUED | OUTPATIENT
Start: 2025-05-23 | End: 2025-05-27

## 2025-05-23 RX ORDER — LORAZEPAM 4 MG/ML
2 VIAL (ML) INJECTION ONCE
Refills: 0 | Status: DISCONTINUED | OUTPATIENT
Start: 2025-05-23 | End: 2025-05-28

## 2025-05-23 RX ORDER — DIPHENHYDRAMINE HCL 12.5MG/5ML
50 ELIXIR ORAL EVERY 6 HOURS
Refills: 0 | Status: DISCONTINUED | OUTPATIENT
Start: 2025-05-23 | End: 2025-05-28

## 2025-05-23 RX ADMIN — CARIPRAZINE 1.5 MILLIGRAM(S): 3 CAPSULE, GELATIN COATED ORAL at 20:51

## 2025-05-23 RX ADMIN — Medication 2 MILLIGRAM(S): at 08:36

## 2025-05-23 RX ADMIN — HALOPERIDOL 5 MILLIGRAM(S): 10 TABLET ORAL at 08:36

## 2025-05-23 RX ADMIN — Medication 50 MILLIGRAM(S): at 08:36

## 2025-05-23 RX ADMIN — Medication 1 MILLIGRAM(S): at 17:30

## 2025-05-23 NOTE — BH TREATMENT PLAN - NSCMSPTSTRENGTHS_PSY_ALL_CORE
Assertive/Courageous/Expressive of emotions/Future/goal oriented/Intelligence/Physically healthy/Resourceful/Self-reliant/Strong support system/Supportive family

## 2025-05-24 PROCEDURE — 99232 SBSQ HOSP IP/OBS MODERATE 35: CPT

## 2025-05-24 RX ORDER — DIPHENHYDRAMINE HCL 12.5MG/5ML
50 ELIXIR ORAL ONCE
Refills: 0 | Status: DISCONTINUED | OUTPATIENT
Start: 2025-05-24 | End: 2025-05-24

## 2025-05-24 RX ORDER — DIPHENHYDRAMINE HCL 12.5MG/5ML
50 ELIXIR ORAL ONCE
Refills: 0 | Status: COMPLETED | OUTPATIENT
Start: 2025-05-24 | End: 2025-05-24

## 2025-05-24 RX ADMIN — Medication 50 MILLIGRAM(S): at 18:35

## 2025-05-24 RX ADMIN — Medication 1 MILLIGRAM(S): at 11:16

## 2025-05-24 NOTE — BH SOCIAL WORK INITIAL PSYCHOSOCIAL EVALUATION - NSBHTREATHX_PSY_ALL_CORE
Pt had been referred to [facility] upon d/c from  in 2/25. Patient had since moved to [place] with his mother. No treatment there either and patient was hospitalized in [place]No

## 2025-05-24 NOTE — BH SOCIAL WORK INITIAL PSYCHOSOCIAL EVALUATION - NSBHCHILDEVENTS_PSY_ALL_CORE
Patient reports having a good childhood. He has 2 sisters. He did well in school and graduated from [employer]/Other (specify)

## 2025-05-24 NOTE — BH SOCIAL WORK INITIAL PSYCHOSOCIAL EVALUATION - OTHER PAST PSYCHIATRIC HISTORY (INCLUDE DETAILS REGARDING ONSET, COURSE OF ILLNESS, INPATIENT/OUTPATIENT TREATMENT)
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     Chart review indicates the pt was recently living in [place] for two months with his mother, where he was unable to attain work or set up a life for himself, and subsequently returned to [place]. The pt has reportedly been experiencing severe anxiety with panic attacks, has been more labile, and has had SI without a clear plan. Collateral obtained from father also indicates the pt's been making more impulsive/rash decisions and throwing out items at home.     On this writer's assessment, the pt is A&O x4, presents as anxious and constricted, with no e/o internal preoccupation, and states he came to the hospital seeking admission to restart medication to target his recent anxiety and mood symptoms. He elaborates stating "I've been yelling an screaming", states he's "not able to function", has been engaging in self harm behaviors by hitting himself, and has had recurrent active SI, but no plan/intent. The pt states he was discharged from the hospital last month on Ativan, but currently has no psychiatric care set up in [place] and is interested in inpatient admission. On ROS, the pt denies recent HI, A/VH, PI, aggression, or substance use.

## 2025-05-24 NOTE — BH INPATIENT PSYCHIATRY PROGRESS NOTE - NSBHCHARTREVIEWINVESTIGATE_PSY_A_CORE FT
Ventricular Rate 56 BPM    Atrial Rate 56 BPM    P-R Interval 202 ms    QRS Duration 92 ms    Q-T Interval 392 ms    QTC Calculation(Bazett) 378 ms    P Axis 56 degrees    R Axis 70 degrees    T Axis 73 degrees    Diagnosis Line Sinus bradycardia with sinus arrhythmia  Otherwise normal ECG  When compared with ECG of 20-MAY-2025 16:56,  No significant change was found  Confirmed by Palla MD, [email or web address] ([id number]) on 5/22/2025 8:14:12 AM

## 2025-05-25 PROCEDURE — 99231 SBSQ HOSP IP/OBS SF/LOW 25: CPT

## 2025-05-25 RX ADMIN — Medication 1 MILLIGRAM(S): at 10:20

## 2025-05-25 NOTE — BH INPATIENT PSYCHIATRY PROGRESS NOTE - NSBHCHARTREVIEWINVESTIGATE_PSY_A_CORE FT
Ventricular Rate 56 BPM    Atrial Rate 56 BPM    P-R Interval 202 ms    QRS Duration 92 ms    Q-T Interval 392 ms    QTC Calculation(Bazett) 378 ms    P Axis 56 degrees    R Axis 70 degrees    T Axis 73 degrees    Diagnosis Line Sinus bradycardia with sinus arrhythmia  Otherwise normal ECG  When compared with ECG of 20-MAY-2025 16:56,  No significant change was found  Confirmed by Palla MD, Jacob [id number]) on 5/22/2025 8:14:12 AM

## 2025-05-26 PROCEDURE — 99232 SBSQ HOSP IP/OBS MODERATE 35: CPT

## 2025-05-26 RX ADMIN — Medication 1 MILLIGRAM(S): at 16:28

## 2025-05-26 RX ADMIN — Medication 1 MILLIGRAM(S): at 08:30

## 2025-05-27 PROCEDURE — 99232 SBSQ HOSP IP/OBS MODERATE 35: CPT

## 2025-05-27 RX ORDER — LORAZEPAM 4 MG/ML
1 VIAL (ML) INJECTION
Refills: 0 | Status: DISCONTINUED | OUTPATIENT
Start: 2025-05-27 | End: 2025-05-28

## 2025-05-27 RX ADMIN — Medication 1 MILLIGRAM(S): at 14:40

## 2025-05-27 RX ADMIN — Medication 1 MILLIGRAM(S): at 21:19

## 2025-05-27 NOTE — BH INPATIENT PSYCHIATRY PROGRESS NOTE - OTHER
less affective lability " I feel good." improving variable with approach / avoidance behavior  at times

## 2025-05-28 VITALS — TEMPERATURE: 98 F | RESPIRATION RATE: 18 BRPM | OXYGEN SATURATION: 100 %

## 2025-05-28 PROCEDURE — 99239 HOSP IP/OBS DSCHRG MGMT >30: CPT

## 2025-05-28 RX ORDER — LORAZEPAM 4 MG/ML
1 VIAL (ML) INJECTION
Qty: 60 | Refills: 0
Start: 2025-05-28 | End: 2025-06-26

## 2025-05-28 RX ORDER — LORAZEPAM 4 MG/ML
1 VIAL (ML) INJECTION
Refills: 0 | DISCHARGE

## 2025-05-28 RX ADMIN — Medication 1 MILLIGRAM(S): at 08:42

## 2025-05-28 NOTE — BH INPATIENT PSYCHIATRY PROGRESS NOTE - CURRENT MEDICATION
MEDICATIONS  (STANDING):  LORazepam     Tablet 1 milliGRAM(s) Oral two times a day    MEDICATIONS  (PRN):  acetaminophen     Tablet .. 650 milliGRAM(s) Oral every 6 hours PRN Temp greater or equal to 38C (100.4F), Mild Pain (1 - 3), Moderate Pain (4 - 6)  aluminum hydroxide/magnesium hydroxide/simethicone Suspension 30 milliLiter(s) Oral every 6 hours PRN Dyspepsia  diphenhydrAMINE Injectable 50 milliGRAM(s) IntraMuscular every 6 hours PRN Extrapyramidal prophylaxis  LORazepam     Tablet 1 milliGRAM(s) Oral every 6 hours PRN mild to moderate anxiety due to mood d/o  LORazepam   Injectable 2 milliGRAM(s) IntraMuscular once PRN severe agitation due to affective d/o  magnesium hydroxide Suspension 30 milliLiter(s) Oral daily PRN Constipation  
MEDICATIONS  (STANDING):  cariprazine 1.5 milliGRAM(s) Oral at bedtime    MEDICATIONS  (PRN):  acetaminophen     Tablet .. 650 milliGRAM(s) Oral every 6 hours PRN Temp greater or equal to 38C (100.4F), Mild Pain (1 - 3), Moderate Pain (4 - 6)  aluminum hydroxide/magnesium hydroxide/simethicone Suspension 30 milliLiter(s) Oral every 6 hours PRN Dyspepsia  diphenhydrAMINE Injectable 50 milliGRAM(s) IntraMuscular every 6 hours PRN Extrapyramidal prophylaxis  haloperidol     Tablet 5 milliGRAM(s) Oral every 6 hours PRN mild to moderate agitation due to mood disorder  haloperidol    Injectable 5 milliGRAM(s) IntraMuscular every 6 hours PRN severe agitation due to mood d/o  LORazepam     Tablet 1 milliGRAM(s) Oral every 6 hours PRN mild to moderate anxiety due to mood d/o  LORazepam   Injectable 2 milliGRAM(s) IntraMuscular once PRN severe agitation due to affective d/o  magnesium hydroxide Suspension 30 milliLiter(s) Oral daily PRN Constipation  
MEDICATIONS  (STANDING):  LORazepam     Tablet 1 milliGRAM(s) Oral two times a day    MEDICATIONS  (PRN):  acetaminophen     Tablet .. 650 milliGRAM(s) Oral every 6 hours PRN Temp greater or equal to 38C (100.4F), Mild Pain (1 - 3), Moderate Pain (4 - 6)  aluminum hydroxide/magnesium hydroxide/simethicone Suspension 30 milliLiter(s) Oral every 6 hours PRN Dyspepsia  diphenhydrAMINE Injectable 50 milliGRAM(s) IntraMuscular every 6 hours PRN Extrapyramidal prophylaxis  LORazepam     Tablet 1 milliGRAM(s) Oral every 6 hours PRN mild to moderate anxiety due to mood d/o  LORazepam   Injectable 2 milliGRAM(s) IntraMuscular once PRN severe agitation due to affective d/o  magnesium hydroxide Suspension 30 milliLiter(s) Oral daily PRN Constipation

## 2025-05-28 NOTE — BH INPATIENT PSYCHIATRY PROGRESS NOTE - NSTXOBSCOMINTERMD_PSY_ALL_CORE
1. Ativan prn but also with recommended low dose standing Ativan for pt massive anxiety ( pt not currently accepting of this plan)  2.Informed consent obtained by the pt for a trial of Vraylar to alleviate the pt's severe OCD and comorbid affective d/o   3.Pt encouraged to attend therapy groups as tolerated with focus on CBT interventions to constrain pt OCD rituals( lengthy showers)  4 .Collateral pt clinical history to be gathered with pt consent  to aid in pt diagnosis and treatment/ disposition planning  5.Disposition planning ongoing

## 2025-05-28 NOTE — BH INPATIENT PSYCHIATRY DISCHARGE NOTE - NSBHASSESSSUMMFT_PSY_ALL_CORE
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, but was fired and has been unemployed for several months, denies PMH, with Past PH of OCD, ADHD, Panic Disorder, unspecified mood/psychotic disorder, prior psych admissions, reports he was discharged last month from a hospital in [place] and before that was at  2/7-2/18/25, denies prior SA, +hx of SIB by hitting self(last did a weeks ago), denies hx of overt aggression, but father did have an OOP against him due to throwing out father's belongings, denies active substance use, not currently connected to care or on meds, who self-presents to the ED accompanied by father for recent mood and anxiety symptoms.   Per ED Telepsychiatry evaluation on 5/21/25:   Chart review indicates the pt was recently living in [place] for two months with his mother, where he was unable to attain work or set up a life for himself, and subsequently returned to [place]. The pt has reportedly been experiencing severe anxiety with panic attacks, has been more labile, and has had SI without a clear plan. Collateral obtained from father also indicates the pt's been making more impulsive/rash decisions and throwing out items at home.   On this writer's assessment, the pt is A & O x4, presents as anxious and constricted, with no e/o internal preoccupation, and states he came to the hospital seeking admission to restart medication to target his recent anxiety and mood symptoms. He elaborates stating "I've been yelling an screaming", states he's "not able to function", has been engaging in self harm behaviors by hitting himself, and has had recurrent active SI, but no plan/intent. The pt states he was discharged from the hospital last month on Ativan, but currently has no psychiatric care set up in [place] and is interested in inpatient admission. On ROS, the pt denies recent HI, AH /VH, PI, aggression, or substance use.  [facility] called father for collateral, but was unable to reach him.     The pt was subsequently admitted to [address] inpatient psychiatry on 5/21/25 on a 9.13 voluntary legal status for acute pt safety and for ongoing pt assessment and treatment of his especially functionally impairing OCD. When the pt was seen by this writer on 5N today 5/22/25, the pt continued to appear very tense and brimming with inner turmoil. The pt appeared to struggle with making even the simplest of decisions and later submitted a 72 hr letter requesting his DC from hospital after just having expressed his wish to be readmitted as   above. Informed consent obtained from the pt for a trial of low dose Vraylar 1.5 mg po q am  in an effort to alleviate the pt's longstanding h/o  affective volatility and associated thought disorganization.  This writer discussed plan with the pt to continue prn low dose Ativan for anxiety as the pt chose not to begin a low dose standing Ativan regimen  in light of the pt's significant anxiety. The pt continued to struggle with even the simplest decisions but he was overall able to maintain his emotional equilibrium. He denied any current SI /HI /AH.     5/24/25:  As per sign out patient submitted 72 hours later but he was also educated and explained that his request will not be granted over the weekend.  Patient states that he knows that he will be discharged and he is comfortable with the plan.  He states that for him he does not make any difference as he is voluntary or involuntary because he is doctor plans to discharge him next week anyway.    Patient minimizes recent hospitalization saying he is only anxious and his insomnia otherwise he does not need any help.  No major behavioral issues observed or reported.  No agitation.  Patient is not responding to internal stimuli.  5/25/25:  Patient is adherent to treatment, he takes medication and participates in unit activities.  He denies being depressed but continues to be anxious.  Minimizes his intrusive thoughts and denies hearing voices seeing things.  Patient does not have thoughts of harming self or somebody else.  He has risk factors mitigated to low with inpatient structure and medication as well as supportive psychotherapy.  5/27/25 Pt seen and discussed with treatment team.  The pt continues to clinically improve with monotherapy with low dose Ativan for anxiety/ OCD. The pt continues to decline Vraylar or any other antidepressant or mood stabilizer. The pt's father has been apprised of the pt's aftercare plan and his aftercare treatment with intake to be scheduled at [facility] in [place] for pt referral to ongoing therapy and med management. The pt is sleeping and eating well and he has been visible on the unit and attending therapy groups with staff support and ongoing encouragement. The pt denies any current SI /HI /AH and he presents as more future oriented and ready to return to work.   5/28/25 Pt seen and discussed with treatment team prior to the pt's DC home from hospital on 5/28/25. The pt continues to show modest clinical improvement with now low dose standing Ativan 1 mg po bid which the pt has now agreed to take to alleviate some of the pt's mood and OCD related anxiety. The pt continues to decline any other medication trials of any other medication group including mood stabilizers , antidepressants or antipsychotic medications, all of which , if properly titrated could potentially begin to alleviate the pt's significant and functionally impairing mood d/o and OCD. The pt has been more visible on the unit, still anxious but in less overall overt distress since the pt has agreed to take standing low dose Ativan as above. The pt is sleeping and eating more consistently and he continues to deny any current SI / HI /AH / VH. The pt remains future oriented and he is excited about returning home.     Clinical handoff has been completed by hospital staff and the pt's medications have been e-prescribed to his requested pharmacy [facility] in [place]. The pt has a Telehealth virtual  intake for ongoing therapy and a referral for ongoing med management at the [facility] in [place] on 5/29/25 at 9:15 am  via [employer] with appt sent to the pt's phone.  The pt is aware that he can contact Dr Meng at [facility] at tel [phone] with any further questions or concerns .

## 2025-05-28 NOTE — BH DISCHARGE NOTE NURSING/SOCIAL WORK/PSYCH REHAB - NSCDUDCCRISIS_PSY_A_CORE
[facility] Well  1 [phone]) [facility]-WELL ([phone])  Text "WELL" to 47335  Website: www.Kleo/.[facility] 1 (789) 621-HOPE (3114) Website: www.safehorizon.org/.  [facility] - DASH – Crisis Care for Children, Adults and Families  [address] [place] [address]  Mobile Crisis Hotline – (405) 329-3452/.National Suicide Prevention Lifeline 0 (811) 782-5374/.  [facility]  1 (577) LIFENET (691-5977)/.  [facility]  (936) 957-9140/.  [facility] Behavioral Health Helpline / [facility] Mobile Crisis  (174) 418-AECX (4183)/.  [facility] Response Crisis Hotline  (343) 695-1855  24 hour telephone crisis intervention and suicide prevention hotline concerned with all mental health issues/.  [facility]  [address] [place] [phone]   Hours:  Monday through Friday from 9 AM to 3 PM/Other.../988 Suicide and Crisis Lifeline

## 2025-05-28 NOTE — BH INPATIENT PSYCHIATRY PROGRESS NOTE - NSBHFUPINTERVALCCFT_PSY_A_CORE
Everything is fine.  My God came to visit everything is good.  
 " I feel good. Ready to go home. What time will I be leaving today?"  
" I will take the Vraylar. I didn't get any Ativan today. Oh. I did?  They switched my room twice for no reason."      Of note, the pt. with OCD and a mood d/o along with ADHD and suspected ASD symptoms had himself asked for the numerous room changes in the context of his OCD and anxious mood lability. Pt now in a single room and adjusting w/o further issues.  Pt had required a CODE GRAY emergency assistance with emergency medication including Ativan 2 mg IM after the pt had become severely and suddenly agitated and he had begun banging things around in his room and was unable to be calmed and distracted with staff verbal support and efforts to deescalate the pt. The pt responded well to emergency IM Haldol 5 mg / Ativan 2 mg IM and Benadryl 50 mg IM.    PLEASE NOTE                 72 hr letter submitted by pt on 5/22/25 but pt aware writer will not accept pt DC on 5/25/25 due to pt need for ongoing inpatient treatment.   
 " I feel good. I'm ready to go home . I have a job interview scheduled and my dad said it would be alright ."  
When  can I be discharged.   Per signout from Dr. Meng patient submitted 72 hours later and she had discussion with him and his family explaining that the hip that he is request will not be granted over the weekend.  She also signed out that family and patient is very comfortable with that decision and plan.    
Everything is fine.  My God came to visit everything is good.

## 2025-05-28 NOTE — BH TREATMENT PLAN - NSTXOBSCOMINTERMD_PSY_ALL_CORE
1. Ativan prn but also with recommended low dose standing Ativan for pt massive anxiety ( pt not currently accepting of this plan)  2.Informed consent obtained by the pt for a trial of Vraylar to alleviate the pt's severe OCD and comorbid affective d/o   3.Pt encouraged to attend therapy groups as tolerated with focus on CBT interventions to constrain pt OCD rituals( lengthy showers)  4 .Collateral pt clinical history to be gathered with pt consent  to aid in pt diagnosis and treatment/ disposition planning  5.Disposition planning ongoing
1. Ativan now taken as a recommended low dose standing Ativan  1 mg po bid for pt massive anxiety ( pt not currently accepting of this plan)  2.DC  Vraylar due to pt reported adverse reaction   3.Pt encouraged to attend therapy groups as tolerated  4 .Collateral pt clinical history to be gathered with pt consent  to aid in pt diagnosis and treatment/ disposition planning  5.Disposition planning ongoing with pt for DC home on 5/28/25 with Telehealth intake at [facility] on 5/29/25 at 9:15 am for ongoing pt treatment  6.Safety planning reviewed and pt aware he can contact Dr Meng at [facility] at tel [phone] with any further questions or concerns.

## 2025-05-28 NOTE — BH INPATIENT PSYCHIATRY PROGRESS NOTE - PRN MEDS
MEDICATIONS  (PRN):  acetaminophen     Tablet .. 650 milliGRAM(s) Oral every 6 hours PRN Temp greater or equal to 38C (100.4F), Mild Pain (1 - 3), Moderate Pain (4 - 6)  aluminum hydroxide/magnesium hydroxide/simethicone Suspension 30 milliLiter(s) Oral every 6 hours PRN Dyspepsia  diphenhydrAMINE Injectable 50 milliGRAM(s) IntraMuscular every 6 hours PRN Extrapyramidal prophylaxis  haloperidol     Tablet 5 milliGRAM(s) Oral every 6 hours PRN mild to moderate agitation due to mood disorder  haloperidol    Injectable 5 milliGRAM(s) IntraMuscular every 6 hours PRN severe agitation due to mood d/o  LORazepam     Tablet 1 milliGRAM(s) Oral every 6 hours PRN mild to moderate anxiety due to mood d/o  LORazepam   Injectable 2 milliGRAM(s) IntraMuscular once PRN severe agitation due to affective d/o  magnesium hydroxide Suspension 30 milliLiter(s) Oral daily PRN Constipation  
MEDICATIONS  (PRN):  acetaminophen     Tablet .. 650 milliGRAM(s) Oral every 6 hours PRN Temp greater or equal to 38C (100.4F), Mild Pain (1 - 3), Moderate Pain (4 - 6)  aluminum hydroxide/magnesium hydroxide/simethicone Suspension 30 milliLiter(s) Oral every 6 hours PRN Dyspepsia  diphenhydrAMINE Injectable 50 milliGRAM(s) IntraMuscular every 6 hours PRN Extrapyramidal prophylaxis  LORazepam     Tablet 1 milliGRAM(s) Oral every 6 hours PRN mild to moderate anxiety due to mood d/o  LORazepam   Injectable 2 milliGRAM(s) IntraMuscular once PRN severe agitation due to affective d/o  magnesium hydroxide Suspension 30 milliLiter(s) Oral daily PRN Constipation  
MEDICATIONS  (PRN):  acetaminophen     Tablet .. 650 milliGRAM(s) Oral every 6 hours PRN Temp greater or equal to 38C (100.4F), Mild Pain (1 - 3), Moderate Pain (4 - 6)  aluminum hydroxide/magnesium hydroxide/simethicone Suspension 30 milliLiter(s) Oral every 6 hours PRN Dyspepsia  diphenhydrAMINE Injectable 50 milliGRAM(s) IntraMuscular every 6 hours PRN Extrapyramidal prophylaxis  LORazepam     Tablet 1 milliGRAM(s) Oral every 6 hours PRN mild to moderate anxiety due to mood d/o  LORazepam   Injectable 2 milliGRAM(s) IntraMuscular once PRN severe agitation due to affective d/o  magnesium hydroxide Suspension 30 milliLiter(s) Oral daily PRN Constipation

## 2025-05-28 NOTE — BH TREATMENT PLAN - NSTXANXINTERMD_PSY_ALL_CORE
1. Ativan now taken as a recommended low dose standing Ativan  1 mg po bid for pt massive anxiety ( pt not currently accepting of this plan)  2.DC  Vraylar due to pt reported adverse reaction   3.Pt encouraged to attend therapy groups as tolerated  4 .Collateral pt clinical history to be gathered with pt consent  to aid in pt diagnosis and treatment/ disposition planning  5.Disposition planning ongoing with pt for DC home on 5/28/25 with Telehealth intake at [facility] on 5/29/25 at 9:15 am for ongoing pt treatment  6.Safety planning reviewed and pt aware he can contact Dr Meng at [facility] at tel [phone] with any further questions or concerns.
1. Ativan prn but also with recommended low dose standing Ativan for pt massive anxiety ( pt not currently accepting of this plan)  2.Informed consent obtained by the pt for a trial of Vraylar to alleviate the pt's severe OCD and comorbid affective d/o   3.Pt encouraged to attend therapy groups as tolerated  4 .Collateral pt clinical history to be gathered with pt consent  to aid in pt diagnosis and treatment/ disposition planning  5.Disposition planning ongoing

## 2025-05-28 NOTE — BH DISCHARGE NOTE NURSING/SOCIAL WORK/PSYCH REHAB - NSDCADDINFO1FT_PSY_ALL_CORE
You have a telehealth appt scheduled with Sarah kam [facility] on 5/29/25 at 9:15 AM. The appt will b via Doxy and a link for the appt will be sent to your phone.     Pt to address any issues that arise related to substance abuse in treatment with [facility].     Please contact Dr. Marah Meng for medication or treatment questions, lab results or any other concerns at [phone]. Handoff provided to your outpatient provider, [facility] Javier.  Patient has agreed to receive a copy of their Nursing/Social Work/MD discharge note in the patient portal.  If needed, please contact [facility], 5N, 24/7 days a week and speak with Kimberlee Miramontes RN Nurse manager or any N staff member.  Please return to the ED if symptoms worsen or return. You have a telehealth appt scheduled with Sarah kam [facility] on 5/29/25 at 9:15 AM. The appt will be via [employer] and a link for the appt will be sent to your phone.     Pt to address any issues that arise related to substance abuse in treatment with [facility].     Please contact Dr. Marah Meng for medication or treatment questions, lab results or any other concerns at [phone]. Handoff provided to your outpatient provider, [facility] Javier.  Patient has agreed to receive a copy of their Nursing/Social Work/MD discharge note in the patient portal.  If needed, please contact [facility], 5N, 24/7 days a week and speak with Kimberlee Miramontes RN Nurse manager or any N staff member.  Please return to the ED if symptoms worsen or return.

## 2025-05-28 NOTE — BH INPATIENT PSYCHIATRY PROGRESS NOTE - NSBHASSESSSUMMFT_PSY_ALL_CORE
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   Per ED Telepsychiatry evaluation on 5/21/25:   Chart review indicates the pt was recently living in [place] for two months with his mother, where he was unable to attain work or set up a life for himself, and subsequently returned to [place]. The pt has reportedly been experiencing severe anxiety with panic attacks, has been more labile, and has had SI without a clear plan. Collateral obtained from father also indicates the pt's been making more impulsive/rash decisions and throwing out items at home.   On this writer's assessment, the pt is A & O x4, presents as anxious and constricted, with no e/o internal preoccupation, and states he came to the hospital seeking admission to restart medication to target his recent anxiety and mood symptoms. He elaborates stating "I've been yelling an screaming", states he's "not able to function", has been engaging in self harm behaviors by hitting himself, and has had recurrent active SI, but no plan/intent. The pt states he was discharged from the hospital last month on Ativan, but currently has no psychiatric care set up in [place] and is interested in inpatient admission. On ROS, the pt denies recent HI, AH /VH, PI, aggression, or substance use.  [facility] called father for collateral, but was unable to reach him.     The pt was subsequently admitted to [address] inpatient psychiatry on 5/21/25 on a 9.13 voluntary legal status for acute pt safety and for ongoing pt assessment and treatment of his especially functionally impairing OCD. When the pt was seen by this writer on 5N today 5/22/25, the pt continued to appear very tense and brimming with inner turmoil. The pt appeared to struggle with making even the simplest of decisions and later submitted a 72 hr letter requesting his DC from hospital after just having expressed his wish to be readmitted as   above. Informed consent obtained from the pt for a trial of low dose Vraylar 1.5 mg po q am  in an effort to alleviate the pt's longstanding h/o  affective volatility and associated thought disorganization.  This writer discussed plan with the pt to continue prn low dose Ativan for anxiety as the pt chose not to begin a low dose standing Ativan regimen  in light of the pt's significant anxiety. The pt continued to struggle with even the simplest decisions but he was overall able to maintain his emotional equilibrium. He denied any current SI /HI /AH.       5/24/25:  As per sign out patient submitted 72 hours later but he was also educated and explained that his request will not be granted over the weekend.  Patient states that he knows that he will be discharged and he is comfortable with the plan.  He states that for him he does not make any difference as he is voluntary or involuntary because he is doctor plans to discharge him next week anyway.    Patient minimizes recent hospitalization saying he is only anxious and his insomnia otherwise he does not need any help.    No major behavioral issues observed or reported.  No agitation.  Patient is not responding to internal stimuli.  5/25/25:  Patient is adherent to treatment, he takes medication and participates in unit activities.  He denies being depressed but continues to be anxious.  Minimizes his intrusive thoughts and denies hearing voices seeing things.  Patient does not have thoughts of harming self or somebody else.  He is risk factors mitigated to low with inpatient structure and medication as well as supportive psychotherapy.  
 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   Per ED Telepsychiatry evaluation on 5/21/25:   Chart review indicates the pt was recently living in [place] for two months with his mother, where he was unable to attain work or set up a life for himself, and subsequently returned to [place]. The pt has reportedly been experiencing severe anxiety with panic attacks, has been more labile, and has had SI without a clear plan. Collateral obtained from father also indicates the pt's been making more impulsive/rash decisions and throwing out items at home.   On this writer's assessment, the pt is A & O x4, presents as anxious and constricted, with no e/o internal preoccupation, and states he came to the hospital seeking admission to restart medication to target his recent anxiety and mood symptoms. He elaborates stating "I've been yelling an screaming", states he's "not able to function", has been engaging in self harm behaviors by hitting himself, and has had recurrent active SI, but no plan/intent. The pt states he was discharged from the hospital last month on Ativan, but currently has no psychiatric care set up in [place] and is interested in inpatient admission. On ROS, the pt denies recent HI, AH /VH, PI, aggression, or substance use.  [facility] called father for collateral, but was unable to reach him.     The pt was subsequently admitted to [address] inpatient psychiatry on 5/21/25 on a 9.13 voluntary legal status for acute pt safety and for ongoing pt assessment and treatment of his especially functionally impairing OCD. When the pt was seen by this writer on 5N today 5/22/25, the pt continued to appear very tense and brimming with inner turmoil. The pt appeared to struggle with making even the simplest of decisions and later submitted a 72 hr letter requesting his DC from hospital after just having expressed his wish to be readmitted as   above. Informed consent obtained from the pt for a trial of low dose Vraylar 1.5 mg po q am  in an effort to alleviate the pt's longstanding h/o  affective volatility and associated thought disorganization.  This writer discussed plan with the pt to continue prn low dose Ativan for anxiety as the pt chose not to begin a low dose standing Ativan regimen  in light of the pt's significant anxiety. The pt continued to struggle with even the simplest decisions but he was overall able to maintain his emotional equilibrium. He denied any current SI /HI /AH. 
 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   Per ED Telepsychiatry evaluation on 5/21/25:   Chart review indicates the pt was recently living in [place] for two months with his mother, where he was unable to attain work or set up a life for himself, and subsequently returned to [place]. The pt has reportedly been experiencing severe anxiety with panic attacks, has been more labile, and has had SI without a clear plan. Collateral obtained from father also indicates the pt's been making more impulsive/rash decisions and throwing out items at home.   On this writer's assessment, the pt is A & O x4, presents as anxious and constricted, with no e/o internal preoccupation, and states he came to the hospital seeking admission to restart medication to target his recent anxiety and mood symptoms. He elaborates stating "I've been yelling an screaming", states he's "not able to function", has been engaging in self harm behaviors by hitting himself, and has had recurrent active SI, but no plan/intent. The pt states he was discharged from the hospital last month on Ativan, but currently has no psychiatric care set up in [place] and is interested in inpatient admission. On ROS, the pt denies recent HI, AH /VH, PI, aggression, or substance use.  [facility] called father for collateral, but was unable to reach him.     The pt was subsequently admitted to [address] inpatient psychiatry on 5/21/25 on a 9.13 voluntary legal status for acute pt safety and for ongoing pt assessment and treatment of his especially functionally impairing OCD. When the pt was seen by this writer on 5N today 5/22/25, the pt continued to appear very tense and brimming with inner turmoil. The pt appeared to struggle with making even the simplest of decisions and later submitted a 72 hr letter requesting his DC from hospital after just having expressed his wish to be readmitted as   above. Informed consent obtained from the pt for a trial of low dose Vraylar 1.5 mg po q am  in an effort to alleviate the pt's longstanding h/o  affective volatility and associated thought disorganization.  This writer discussed plan with the pt to continue prn low dose Ativan for anxiety as the pt chose not to begin a low dose standing Ativan regimen  in light of the pt's significant anxiety. The pt continued to struggle with even the simplest decisions but he was overall able to maintain his emotional equilibrium. He denied any current SI /HI /AH.       5/24/25:  As per sign out patient submitted 72 hours later but he was also educated and explained that his request will not be granted over the weekend.  Patient states that he knows that he will be discharged and he is comfortable with the plan.  He states that for him he does not make any difference as he is voluntary or involuntary because he is doctor plans to discharge him next week anyway.    Patient minimizes recent hospitalization saying he is only anxious and his insomnia otherwise he does not need any help.    No major behavioral issues observed or reported.  No agitation.  Patient is not responding to internal stimuli.    
 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   Per ED Telepsychiatry evaluation on 5/21/25:   Chart review indicates the pt was recently living in [place] for two months with his mother, where he was unable to attain work or set up a life for himself, and subsequently returned to [place]. The pt has reportedly been experiencing severe anxiety with panic attacks, has been more labile, and has had SI without a clear plan. Collateral obtained from father also indicates the pt's been making more impulsive/rash decisions and throwing out items at home.   On this writer's assessment, the pt is A & O x4, presents as anxious and constricted, with no e/o internal preoccupation, and states he came to the hospital seeking admission to restart medication to target his recent anxiety and mood symptoms. He elaborates stating "I've been yelling an screaming", states he's "not able to function", has been engaging in self harm behaviors by hitting himself, and has had recurrent active SI, but no plan/intent. The pt states he was discharged from the hospital last month on Ativan, but currently has no psychiatric care set up in [place] and is interested in inpatient admission. On ROS, the pt denies recent HI, AH /VH, PI, aggression, or substance use.  [facility] called father for collateral, but was unable to reach him.     The pt was subsequently admitted to [address] inpatient psychiatry on 5/21/25 on a 9.13 voluntary legal status for acute pt safety and for ongoing pt assessment and treatment of his especially functionally impairing OCD. When the pt was seen by this writer on 5N today 5/22/25, the pt continued to appear very tense and brimming with inner turmoil. The pt appeared to struggle with making even the simplest of decisions and later submitted a 72 hr letter requesting his DC from hospital after just having expressed his wish to be readmitted as   above. Informed consent obtained from the pt for a trial of low dose Vraylar 1.5 mg po q am  in an effort to alleviate the pt's longstanding h/o  affective volatility and associated thought disorganization.  This writer discussed plan with the pt to continue prn low dose Ativan for anxiety as the pt chose not to begin a low dose standing Ativan regimen  in light of the pt's significant anxiety. The pt continued to struggle with even the simplest decisions but he was overall able to maintain his emotional equilibrium. He denied any current SI /HI /AH.       5/24/25:  As per sign out patient submitted 72 hours later but he was also educated and explained that his request will not be granted over the weekend.  Patient states that he knows that he will be discharged and he is comfortable with the plan.  He states that for him he does not make any difference as he is voluntary or involuntary because he is doctor plans to discharge him next week anyway.    Patient minimizes recent hospitalization saying he is only anxious and his insomnia otherwise he does not need any help.  No major behavioral issues observed or reported.  No agitation.  Patient is not responding to internal stimuli.  5/25/25:  Patient is adherent to treatment, he takes medication and participates in unit activities.  He denies being depressed but continues to be anxious.  Minimizes his intrusive thoughts and denies hearing voices seeing things.  Patient does not have thoughts of harming self or somebody else.  He has risk factors mitigated to low with inpatient structure and medication as well as supportive psychotherapy.  5/27/25 Pt seen and discussed with treatment team.  The pt continues to clinically improve with monotherapy with low dose Ativan for anxiety/ OCD. The pt continues to decline Vraylar or any other antidepressant or mood stabilizer. The pt's father has been apprised of the pt's aftercare plan and his aftercare treatment with intake to be scheduled at [facility] [place] for pt referral to ongoing therapy and med management. The pt is sleeping and eating well and he has been visible on the unit and attending therapy groups with staff support and ongoing encouragement. The pt denies any current SI /HI /AH and he presents as more future oriented and ready to return to work.   
 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   Per ED Telepsychiatry evaluation on 5/21/25:   Chart review indicates the pt was recently living in [place] for two months with his mother, where he was unable to attain work or set up a life for himself, and subsequently returned to [place]. The pt has reportedly been experiencing severe anxiety with panic attacks, has been more labile, and has had SI without a clear plan. Collateral obtained from father also indicates the pt's been making more impulsive/rash decisions and throwing out items at home.   On this writer's assessment, the pt is A & O x4, presents as anxious and constricted, with no e/o internal preoccupation, and states he came to the hospital seeking admission to restart medication to target his recent anxiety and mood symptoms. He elaborates stating "I've been yelling an screaming", states he's "not able to function", has been engaging in self harm behaviors by hitting himself, and has had recurrent active SI, but no plan/intent. The pt states he was discharged from the hospital last month on Ativan, but currently has no psychiatric care set up in [place] and is interested in inpatient admission. On ROS, the pt denies recent HI, AH /VH, PI, aggression, or substance use.  [facility] called father for collateral, but was unable to reach him.     The pt was subsequently admitted to [address] inpatient psychiatry on 5/21/25 on a 9.13 voluntary legal status for acute pt safety and for ongoing pt assessment and treatment of his especially functionally impairing OCD. When the pt was seen by this writer on 5N today 5/22/25, the pt continued to appear very tense and brimming with inner turmoil. The pt appeared to struggle with making even the simplest of decisions and later submitted a 72 hr letter requesting his DC from hospital after just having expressed his wish to be readmitted as   above. Informed consent obtained from the pt for a trial of low dose Vraylar 1.5 mg po q am  in an effort to alleviate the pt's longstanding h/o  affective volatility and associated thought disorganization.  This writer discussed plan with the pt to continue prn low dose Ativan for anxiety as the pt chose not to begin a low dose standing Ativan regimen  in light of the pt's significant anxiety. The pt continued to struggle with even the simplest decisions but he was overall able to maintain his emotional equilibrium. He denied any current SI /HI /AH.       5/24/25:  As per sign out patient submitted 72 hours later but he was also educated and explained that his request will not be granted over the weekend.  Patient states that he knows that he will be discharged and he is comfortable with the plan.  He states that for him he does not make any difference as he is voluntary or involuntary because he is doctor plans to discharge him next week anyway.    Patient minimizes recent hospitalization saying he is only anxious and his insomnia otherwise he does not need any help.    No major behavioral issues observed or reported.  No agitation.  Patient is not responding to internal stimuli.  5/25/25:  Patient is adherent to treatment, he takes medication and participates in unit activities.  He denies being depressed but continues to be anxious.  Minimizes his intrusive thoughts and denies hearing voices seeing things.  Patient does not have thoughts of harming self or somebody else.  He is risk factors mitigated to low with inpatient structure and medication as well as supportive psychotherapy.  
 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   Per ED Telepsychiatry evaluation on 5/21/25:   Chart review indicates the pt was recently living in [place] for two months with his mother, where he was unable to attain work or set up a life for himself, and subsequently returned to [place]. The pt has reportedly been experiencing severe anxiety with panic attacks, has been more labile, and has had SI without a clear plan. Collateral obtained from father also indicates the pt's been making more impulsive/rash decisions and throwing out items at home.   On this writer's assessment, the pt is A & O x4, presents as anxious and constricted, with no e/o internal preoccupation, and states he came to the hospital seeking admission to restart medication to target his recent anxiety and mood symptoms. He elaborates stating "I've been yelling an screaming", states he's "not able to function", has been engaging in self harm behaviors by hitting himself, and has had recurrent active SI, but no plan/intent. The pt states he was discharged from the hospital last month on Ativan, but currently has no psychiatric care set up in [place] and is interested in inpatient admission. On ROS, the pt denies recent HI, AH /VH, PI, aggression, or substance use.  [facility] called father for collateral, but was unable to reach him.     The pt was subsequently admitted to [address] inpatient psychiatry on 5/21/25 on a 9.13 voluntary legal status for acute pt safety and for ongoing pt assessment and treatment of his especially functionally impairing OCD. When the pt was seen by this writer on 5N today 5/22/25, the pt continued to appear very tense and brimming with inner turmoil. The pt appeared to struggle with making even the simplest of decisions and later submitted a 72 hr letter requesting his DC from hospital after just having expressed his wish to be readmitted as   above. Informed consent obtained from the pt for a trial of low dose Vraylar 1.5 mg po q am  in an effort to alleviate the pt's longstanding h/o  affective volatility and associated thought disorganization.  This writer discussed plan with the pt to continue prn low dose Ativan for anxiety as the pt chose not to begin a low dose standing Ativan regimen  in light of the pt's significant anxiety. The pt continued to struggle with even the simplest decisions but he was overall able to maintain his emotional equilibrium. He denied any current SI /HI /AH.     5/24/25:  As per sign out patient submitted 72 hours later but he was also educated and explained that his request will not be granted over the weekend.  Patient states that he knows that he will be discharged and he is comfortable with the plan.  He states that for him he does not make any difference as he is voluntary or involuntary because he is doctor plans to discharge him next week anyway.    Patient minimizes recent hospitalization saying he is only anxious and his insomnia otherwise he does not need any help.  No major behavioral issues observed or reported.  No agitation.  Patient is not responding to internal stimuli.  5/25/25:  Patient is adherent to treatment, he takes medication and participates in unit activities.  He denies being depressed but continues to be anxious.  Minimizes his intrusive thoughts and denies hearing voices seeing things.  Patient does not have thoughts of harming self or somebody else.  He has risk factors mitigated to low with inpatient structure and medication as well as supportive psychotherapy.  5/27/25 Pt seen and discussed with treatment team.  The pt continues to clinically improve with monotherapy with low dose Ativan for anxiety/ OCD. The pt continues to decline Vraylar or any other antidepressant or mood stabilizer. The pt's father has been apprised of the pt's aftercare plan and his aftercare treatment with intake to be scheduled at the [facility] in [place] for pt referral to ongoing therapy and med management. The pt is sleeping and eating well and he has been visible on the unit and attending therapy groups with staff support and ongoing encouragement. The pt denies any current SI /HI /AH and he presents as more future oriented and ready to return to work.   5/28/25 Pt seen and discussed with treatment team prior to the pt's DC home from hospital on 5/28/25. The pt continues to show modest clinical improvement with now low dose standing Ativan 1 mg po bid which the pt has now agreed to take to alleviate some of the pt's mood and OCD related anxiety. The pt continues to decline any other medication trials of any other medication group including mood stabilizers , antidepressants or antipsychotic medications, all of which , if properly titrated could potentially begin to alleviate the pt's significant and functionally impairing mood d/o and OCD. The pt has been more visible on the unit, still anxious but in less overall overt distress since the pt has agreed to take standing low dose Ativan as above. The pt is sleeping and eating more consistently and he continues to deny any current SI / HI /AH / VH. The pt remains future oriented and he is excited about returning home.     Clinical handoff has been completed by hospital staff and the pt's medications have been e-prescribed to his requested pharmacy [facility] in [place]. The pt has a Telehealth virtual  intake for ongoing therapy and a referral for ongoing med management at the [facility] in [place] on 5/29/25 at 9:15 am  via Doxy with appt sent to the pt's phone.  The pt is aware that he can contact Dr Meng at [facility] at tel [phone] with any further questions or concerns .

## 2025-05-28 NOTE — BH INPATIENT PSYCHIATRY PROGRESS NOTE - NSTXDEPRESINTERMD_PSY_ALL_CORE
1. Ativan prn but also with recommended low dose standing Ativan for pt massive anxiety ( pt not currently accepting of this plan)  2.Informed consent obtained by the pt for a trial of Vraylar to alleviate the pt's severe OCD and comorbid affective d/o   3.Pt encouraged to attend therapy groups as tolerated  4 .Collateral pt clinical history to be gathered with pt consent  to aid in pt diagnosis and treatment/ disposition planning  5.Disposition planning ongoing

## 2025-05-28 NOTE — BH INPATIENT PSYCHIATRY PROGRESS NOTE - NSTXDCOTHRGOAL_PSY_ALL_CORE
Pt will have social work assessment of clinical and social needs for discharge planning.Patient will have safe placement upon d/c from  and he will have an appt for continuing in the appropriate level of psychiatric o/p treatment and if indicated, AUD/LOBITO treatment within 5 business days of d/c.Patient will have resources/referrals to address any SDOH needs identified during this admission.
Pt will have social work assessment of clinical and social needs for discharge planning.  Patient will have safe placement upon d/c from  and he will have an appt for continuing in the appropriate level of psychiatric o/p treatment and if indicated, AUD/LOBITO treatment within 5 business days of d/c.  Patient will have resources/referrals to address any SDOH needs identified during this admission.

## 2025-05-28 NOTE — BH INPATIENT PSYCHIATRY PROGRESS NOTE - NSBHMSEKNOWHOW_PSY_ALL_CORE
Current Events/Educational attainment/Vocabulary

## 2025-05-28 NOTE — BH INPATIENT PSYCHIATRY DISCHARGE NOTE - NSBHFUPINTERVALHXFT_PSY_A_CORE
05/26/2025: Patient was seen today AM in his room, prefers to sty indoors and limited cooperation with peers. He refused to take Vraylar 1.5 mg daily as he feels that he had a reaction e.g. Tongue tied and for this reason he stopped the Vraylar for past 2 days. He was given earlier a dose of Benadryl 50 mg x 1 dose PO. He refused to rescind the 3-D letter for discharge as he feels that he would do better with Out-patient f/u on discharge.    Patient is visible in the unit, dressed in a hospital gown.  He walks up and down floors.  Patient continued to ask about discharge planning and wants to know if he can be discharged next week.    Patient reports compliance with medical treatment and he takes medication.  He reports feeling better.  He is still mildly anxious but denies being depressed.  He minimizes intrusive thoughts and states that relationship with father is very good so he can go back home when he is ready.    Patient denies hearing voices seeing things and he does not have any thoughts of harming self or somebody else.

## 2025-05-28 NOTE — BH INPATIENT PSYCHIATRY DISCHARGE NOTE - NSBHDCRISKMITIGATEFT_PSY_ALL_CORE
Clinical handoff has been completed by hospital staff and the pt's medications have been e-prescribed to his requested pharmacy [facility] in [place]. The pt has a Telehealth virtual  intake for ongoing therapy and a referral for ongoing med management at the [facility] in [place] on 5/29/25 at 9:15 am  via Doxy with appt sent to the pt's phone.  The pt is aware that he can contact Dr Meng at [facility] at tel [phone] with any further questions or concerns .

## 2025-05-28 NOTE — BH INPATIENT PSYCHIATRY PROGRESS NOTE - NSBHMETABOLIC_PSY_ALL_CORE_FT
BMI: BMI (kg/m2): 20.2 (05-21-25 @ 17:21)  HbA1c: A1C with Estimated Average Glucose Result: 5.2 % (05-21-25 @ 11:10)    Glucose:   BP: --Vital Signs Last 24 Hrs  T(C): 36.5 (05-26-25 @ 08:09), Max: 36.5 (05-26-25 @ 08:09)  T(F): 97.7 (05-26-25 @ 08:09), Max: 97.7 (05-26-25 @ 08:09)  HR: --  BP: --  BP(mean): --  RR: 18 (05-26-25 @ 08:09) (18 - 18)  SpO2: 100% (05-26-25 @ 08:09) (100% - 100%)    Orthostatic VS  05-26-25 @ 08:09  Lying BP: --/-- HR: --  Sitting BP: 131/73 HR: 70  Standing BP: 126/79 HR: 78  Site: upper right arm  Mode: electronic  Orthostatic VS  05-25-25 @ 08:36  Lying BP: --/-- HR: --  Sitting BP: 128/67 HR: 89  Standing BP: 111/60 HR: 107  Site: --  Mode: --    Lipid Panel: Date/Time: 05-22-25 @ 09:26  Cholesterol, Serum: 195  LDL Cholesterol Calculated: 63  HDL Cholesterol, Serum: 119  Total Cholesterol/HDL Ration Measurement: --  Triglycerides, Serum: 67  
BMI: BMI (kg/m2): 20.2 (05-21-25 @ 17:21)  HbA1c: A1C with Estimated Average Glucose Result: 5.2 % (05-21-25 @ 11:10)    Glucose:   BP: --Vital Signs Last 24 Hrs  T(C): 36.8 (05-27-25 @ 06:50), Max: 36.8 (05-27-25 @ 06:50)  T(F): 98.3 (05-27-25 @ 06:50), Max: 98.3 (05-27-25 @ 06:50)  HR: --  BP: --  BP(mean): --  RR: 16 (05-27-25 @ 06:50) (16 - 16)  SpO2: 100% (05-27-25 @ 06:50) (100% - 100%)    Orthostatic VS  05-27-25 @ 06:50  Lying BP: --/-- HR: --  Sitting BP: 126/51 HR: 93  Standing BP: 112/70 HR: 96  Site: upper right arm  Mode: electronic  Orthostatic VS  05-26-25 @ 08:09  Lying BP: --/-- HR: --  Sitting BP: 131/73 HR: 70  Standing BP: 126/79 HR: 78  Site: upper right arm  Mode: electronic    Lipid Panel: Date/Time: 05-22-25 @ 09:26  Cholesterol, Serum: 195  LDL Cholesterol Calculated: 63  HDL Cholesterol, Serum: 119  Total Cholesterol/HDL Ration Measurement: --  Triglycerides, Serum: 67  
BMI: BMI (kg/m2): 20.2 (05-21-25 @ 17:21)  HbA1c: A1C with Estimated Average Glucose Result: 5.2 % (05-21-25 @ 11:10)    Glucose:   BP: 132/78 (05-21-25 @ 15:36) (107/68 - 132/78)Vital Signs Last 24 Hrs  T(C): 36.6 (05-23-25 @ 07:48), Max: 36.6 (05-23-25 @ 07:48)  T(F): 97.9 (05-23-25 @ 07:48), Max: 97.9 (05-23-25 @ 07:48)  HR: --  BP: --  BP(mean): --  RR: 18 (05-23-25 @ 07:48) (18 - 18)  SpO2: 100% (05-23-25 @ 07:48) (100% - 100%)    Orthostatic VS  05-23-25 @ 07:48  Lying BP: --/-- HR: --  Sitting BP: 125/72 HR: 87  Standing BP: 111/72 HR: 102  Site: upper right arm  Mode: electronic  Orthostatic VS  05-22-25 @ 07:40  Lying BP: --/-- HR: --  Sitting BP: 114/72 HR: 78  Standing BP: 116/71 HR: 75  Site: upper right arm  Mode: electronic    Lipid Panel: Date/Time: 05-22-25 @ 09:26  Cholesterol, Serum: 195  LDL Cholesterol Calculated: 63  HDL Cholesterol, Serum: 119  Total Cholesterol/HDL Ration Measurement: --  Triglycerides, Serum: 67  
BMI: BMI (kg/m2): 20.2 (05-21-25 @ 17:21)  HbA1c: A1C with Estimated Average Glucose Result: 5.2 % (05-21-25 @ 11:10)    Glucose:   BP: --Vital Signs Last 24 Hrs  T(C): 36.7 (05-28-25 @ 07:28), Max: 36.7 (05-28-25 @ 07:28)  T(F): 98.1 (05-28-25 @ 07:28), Max: 98.1 (05-28-25 @ 07:28)  HR: --  BP: --  BP(mean): --  RR: 18 (05-28-25 @ 07:28) (18 - 18)  SpO2: 100% (05-28-25 @ 07:28) (100% - 100%)    Orthostatic VS  05-28-25 @ 07:28  Lying BP: --/-- HR: --  Sitting BP: 124/77 HR: 79  Standing BP: 115/67 HR: 91  Site: upper left arm  Mode: electronic  Orthostatic VS  05-27-25 @ 06:50  Lying BP: --/-- HR: --  Sitting BP: 126/51 HR: 93  Standing BP: 112/70 HR: 96  Site: upper right arm  Mode: electronic    Lipid Panel: Date/Time: 05-22-25 @ 09:26  Cholesterol, Serum: 195  LDL Cholesterol Calculated: 63  HDL Cholesterol, Serum: 119  Total Cholesterol/HDL Ration Measurement: --  Triglycerides, Serum: 67  
BMI: BMI (kg/m2): 20.2 (05-21-25 @ 17:21)  HbA1c: A1C with Estimated Average Glucose Result: 5.2 % (05-21-25 @ 11:10)    Glucose:   BP: 132/78 (05-21-25 @ 15:36) (132/78 - 132/78)Vital Signs Last 24 Hrs  T(C): 36.6 (05-24-25 @ 07:33), Max: 36.6 (05-24-25 @ 07:33)  T(F): 97.9 (05-24-25 @ 07:33), Max: 97.9 (05-24-25 @ 07:33)  HR: --  BP: --  BP(mean): --  RR: 18 (05-24-25 @ 07:33) (18 - 18)  SpO2: 100% (05-24-25 @ 07:33) (100% - 100%)    Orthostatic VS  05-24-25 @ 07:33  Lying BP: --/-- HR: --  Sitting BP: 109/58 HR: 97  Standing BP: 115/70 HR: 102  Site: upper right arm  Mode: auscultated w/ stethoscope  Orthostatic VS  05-23-25 @ 07:48  Lying BP: --/-- HR: --  Sitting BP: 125/72 HR: 87  Standing BP: 111/72 HR: 102  Site: upper right arm  Mode: electronic    Lipid Panel: Date/Time: 05-22-25 @ 09:26  Cholesterol, Serum: 195  LDL Cholesterol Calculated: 63  HDL Cholesterol, Serum: 119  Total Cholesterol/HDL Ration Measurement: --  Triglycerides, Serum: 67  
BMI: BMI (kg/m2): 20.2 (05-21-25 @ 17:21)  HbA1c: A1C with Estimated Average Glucose Result: 5.2 % (05-21-25 @ 11:10)    Glucose:   BP: --Vital Signs Last 24 Hrs  T(C): 36.6 (05-25-25 @ 08:36), Max: 36.6 (05-25-25 @ 08:36)  T(F): 97.9 (05-25-25 @ 08:36), Max: 97.9 (05-25-25 @ 08:36)  HR: --  BP: --  BP(mean): --  RR: 18 (05-25-25 @ 08:36) (18 - 18)  SpO2: 100% (05-25-25 @ 08:36) (100% - 100%)    Orthostatic VS  05-25-25 @ 08:36  Lying BP: --/-- HR: --  Sitting BP: 128/67 HR: 89  Standing BP: 111/60 HR: 107  Site: --  Mode: --  Orthostatic VS  05-24-25 @ 07:33  Lying BP: --/-- HR: --  Sitting BP: 109/58 HR: 97  Standing BP: 115/70 HR: 102  Site: upper right arm  Mode: auscultated w/ stethoscope    Lipid Panel: Date/Time: 05-22-25 @ 09:26  Cholesterol, Serum: 195  LDL Cholesterol Calculated: 63  HDL Cholesterol, Serum: 119  Total Cholesterol/HDL Ration Measurement: --  Triglycerides, Serum: 67

## 2025-05-28 NOTE — BH TREATMENT PLAN - NSDCCRITERIA_PSY_ALL_CORE
acute resolution of the pt's severe affective lability  and OCD symptoms
acute resolution of the pt's severe affective lability  and OCD symptoms
9

## 2025-05-28 NOTE — BH INPATIENT PSYCHIATRY PROGRESS NOTE - OTHER
" I feel good." variable with approach / avoidance behavior  at times improving less affective lability

## 2025-05-28 NOTE — BH INPATIENT PSYCHIATRY PROGRESS NOTE - NSTXOBSCOMGOAL_PSY_ALL_CORE
Be able to perform most ADLs and self-care despite obsessions and compulsions

## 2025-05-28 NOTE — BH INPATIENT PSYCHIATRY PROGRESS NOTE - NSBHMSEBODY_PSY_A_CORE
Average build/Underweight

## 2025-05-28 NOTE — BH INPATIENT PSYCHIATRY DISCHARGE NOTE - NSDCPROCEDURESFT_PSY_ALL_CORE
TSH = 1.33  Fasting lipids  Cholesterol = 195  TG= 67  HgA1C= 5.2  SARS CO V 2  / RSV / INFLUENZA ALL NOT DETECTED ON 5/20/25  Urine tox screen Negative for any substances of potential abuse  BAL<10  EKG  VR= 76  QT /QTc = 344/387  BMI = 20.2  UA + ketones  BUN = 31  No studies are pending

## 2025-05-28 NOTE — BH DISCHARGE NOTE NURSING/SOCIAL WORK/PSYCH REHAB - PATIENT PORTAL LINK FT
You can access the FollowMyHealth Patient Portal offered by [facility] by registering at the following website: [email or web address]. By joining [facility]’s FollowMyHealth portal, you will also be able to view your health information using other applications (apps) compatible with our system.

## 2025-05-28 NOTE — BH INPATIENT PSYCHIATRY DISCHARGE NOTE - NSBHMETABOLIC_PSY_ALL_CORE_FT
BMI: BMI (kg/m2): 20.2 (05-21-25 @ 17:21)  HbA1c: A1C with Estimated Average Glucose Result: 5.2 % (05-21-25 @ 11:10)    Glucose:   BP: --Vital Signs Last 24 Hrs  T(C): 36.7 (05-28-25 @ 07:28), Max: 36.7 (05-28-25 @ 07:28)  T(F): 98.1 (05-28-25 @ 07:28), Max: 98.1 (05-28-25 @ 07:28)  HR: --  BP: --  BP(mean): --  RR: 18 (05-28-25 @ 07:28) (18 - 18)  SpO2: 100% (05-28-25 @ 07:28) (100% - 100%)    Orthostatic VS  05-28-25 @ 07:28  Lying BP: --/-- HR: --  Sitting BP: 124/77 HR: 79  Standing BP: 115/67 HR: 91  Site: upper left arm  Mode: electronic  Orthostatic VS  05-27-25 @ 06:50  Lying BP: --/-- HR: --  Sitting BP: 126/51 HR: 93  Standing BP: 112/70 HR: 96  Site: upper right arm  Mode: electronic    Lipid Panel: Date/Time: 05-22-25 @ 09:26  Cholesterol, Serum: 195  LDL Cholesterol Calculated: 63  HDL Cholesterol, Serum: 119  Total Cholesterol/HDL Ration Measurement: --  Triglycerides, Serum: 67

## 2025-05-28 NOTE — BH INPATIENT PSYCHIATRY DISCHARGE NOTE - OTHER PAST PSYCHIATRIC HISTORY (INCLUDE DETAILS REGARDING ONSET, COURSE OF ILLNESS, INPATIENT/OUTPATIENT TREATMENT)
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     Chart review indicates the pt was recently living in [place] for two months with his mother, where he was unable to attain work or set up a life for himself, and subsequently returned to [place]. The pt has reportedly been experiencing severe anxiety with panic attacks, has been more labile, and has had SI without a clear plan. Collateral obtained from father also indicates the pt's been making more impulsive/rash decisions and throwing out items at home.     On this writer's assessment, the pt is A&O x4, presents as anxious and constricted, with no e/o internal preoccupation, and states he came to the hospital seeking admission to restart medication to target his recent anxiety and mood symptoms. He elaborates stating "I've been yelling an screaming", states he's "not able to function", has been engaging in self harm behaviors by hitting himself, and has had recurrent active SI, but no plan/intent. The pt states he was discharged from the hospital last month on Ativan, but currently has no psychiatric care set up in [place] and is interested in inpatient admission. On ROS, the pt denies recent HI, A/VH, PI, aggression, or substance use.

## 2025-05-28 NOTE — BH TREATMENT PLAN - NSTXDCOTHRINTERSW_PSY_ALL_CORE
SW received voicemail from [facility] who provided pt with a telehealth aftercare appt on 5/29/25 at 9:15 AM with Sarah. Per the voicemail from [facility], pt's appt with take place via [facility] and a link will be sent to the pt's phone for the appt. Pt and pt's father to be updated.

## 2025-05-28 NOTE — BH TREATMENT PLAN - NSTXDEPRESINTERMD_PSY_ALL_CORE
1. Ativan now taken as a recommended low dose standing Ativan  1 mg po bid for pt massive anxiety ( pt not currently accepting of this plan)  2.DC  Vraylar due to pt reported adverse reaction   3.Pt encouraged to attend therapy groups as tolerated  4 .Collateral pt clinical history to be gathered with pt consent  to aid in pt diagnosis and treatment/ disposition planning  5.Disposition planning ongoing with pt for DC home on 5/28/25 with Telehealth intake at [facility] on 5/29/25 at 9:15 am for ongoing pt treatment  6.Safety planning reviewed and pt aware he can contact Dr Meng at [facility] at tel [phone] with any further questions or concerns.
1. Ativan prn but also with recommended low dose standing Ativan for pt massive anxiety ( pt not currently accepting of this plan)  2.Informed consent obtained by the pt for a trial of Vraylar to alleviate the pt's severe OCD and comorbid affective d/o   3.Pt encouraged to attend therapy groups as tolerated  4 .Collateral pt clinical history to be gathered with pt consent  to aid in pt diagnosis and treatment/ disposition planning  5.Disposition planning ongoing

## 2025-05-28 NOTE — BH TREATMENT PLAN - NSPTSTATEDGOAL_PSY_ALL_CORE
" I think I need to leave here. I don't know. Maybe I can stay but I don't know for how long."
I need to be able to function

## 2025-05-28 NOTE — BH TREATMENT PLAN - ANXIETY/PANIC/FEAR NURSING INTERVENTIONS/RECOMMENDATIONS
Nurse will educate the patient on the importance of performing ADLs  Nurse will encourage patient to continue medication regimen as prescribed.
Nurse will educate the patient on the importance of performing ADLs  Nurse will encourage patient to continue medication regimen as prescribed.  Teach patient relaxation exercises  Decrease stimuli as needed  Encourage participation in unit activites with emphasis on relaxation and meditation groups  Offer PRN medication when appropriate and monitor effects

## 2025-05-28 NOTE — BH INPATIENT PSYCHIATRY PROGRESS NOTE - NSICDXBHPRIMARYDX_PSY_ALL_CORE
Mood disorder   F39  
Mood disorder   F39  
Affective disorder   F39  
Mood disorder   F39  

## 2025-05-28 NOTE — BH INPATIENT PSYCHIATRY PROGRESS NOTE - NSICDXBHSECONDARYDX_PSY_ALL_CORE
OCD (obsessive compulsive disorder)   F42.9  

## 2025-05-28 NOTE — BH INPATIENT PSYCHIATRY PROGRESS NOTE - NSBHMSETHTCONTENT_PSY_A_CORE
Obsessions/Preoccupations/Ruminations

## 2025-05-28 NOTE — BH INPATIENT PSYCHIATRY PROGRESS NOTE - NSBHATTESTBILLING_PSY_A_CORE
33510-Tkzexhtbsa OBS or IP - moderate complexity OR 35-49 mins
23399-Ppdkeivymn OBS or IP - moderate complexity OR 35-49 mins
48702-Pcayucjurb OBS or IP - moderate complexity OR 35-49 mins
53957-Alavlkiusz OBS or IP - low complexity OR 25-34 mins
04529-Jpimxzspoh OBS or IP - low complexity OR 25-34 mins
23864-Myhcrumrpo OBS or IP - low complexity OR 25-34 mins

## 2025-05-28 NOTE — BH INPATIENT PSYCHIATRY DISCHARGE NOTE - HPI (INCLUDE ILLNESS QUALITY, SEVERITY, DURATION, TIMING, CONTEXT, MODIFYING FACTORS, ASSOCIATED SIGNS AND SYMPTOMS)
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   Per ED Telepsychiatry evaluation on 5/21/25:   Chart review indicates the pt was recently living in [place] for two months with his mother, where he was unable to attain work or set up a life for himself, and subsequently returned to [place]. The pt has reportedly been experiencing severe anxiety with panic attacks, has been more labile, and has had SI without a clear plan. Collateral obtained from father also indicates the pt's been making more impulsive/rash decisions and throwing out items at home.   On this writer's assessment, the pt is A & O x4, presents as anxious and constricted, with no e/o internal preoccupation, and states he came to the hospital seeking admission to restart medication to target his recent anxiety and mood symptoms. He elaborates stating "I've been yelling an screaming", states he's "not able to function", has been engaging in self harm behaviors by hitting himself, and has had recurrent active SI, but no plan/intent. The pt states he was discharged from the hospital last month on Ativan, but currently has no psychiatric care set up in [place] and is interested in inpatient admission. On ROS, the pt denies recent HI, AH /VH, PI, aggression, or substance use.  [facility] called father for collateral, but was unable to reach him.     The pt was subsequently admitted to [address] inpatient psychiatry on 5/21/25 on a 9.13 voluntary legal status for acute pt safety and for ongoing pt assessment and treatment of his especially functionally impairing OCD. When the pt was seen by this writer on 5N today 5/22/25, the pt continued to appear very tense and brimming with inner turmoil. The pt appeared to struggle with making even the simplest of decisions and later submitted a 72 hr letter requesting his DC from hospital after just having expressed his wish to be readmitted as   above. Informed consent obtained from the pt for a trial of low dose Vraylar 1.5 mg po q am  in an effort to alleviate the pt's longstanding h/o  affective volatility and associated thought disorganization.  This writer discussed plan with the pt to continue prn low dose Ativan for anxiety as the pt chose not to begin a low dose standing Ativan regimen  in light of the pt's significant anxiety. The pt continued to struggle with even the simplest decisions but he was overall able to maintain his emotional equilibrium. He denied any current SI /HI /AH.

## 2025-05-28 NOTE — BH TREATMENT PLAN - NSTXDCOTHRGOAL_PSY_ALL_CORE
Pt will have social work assessment of clinical and social needs for discharge planning.Patient will have safe placement upon d/c from  and he will have an appt for continuing in the appropriate level of psychiatric o/p treatment and if indicated, AUD/LOBITO treatment within 5 business days of d/c.Patient will have resources/referrals to address any SDOH needs identified during this admission.

## 2025-05-28 NOTE — BH INPATIENT PSYCHIATRY DISCHARGE NOTE - NSBHDCHANDOFFFT_PSY_ALL_CORE
Clinical handoff has been completed by hospital staff and the pt's medications have been e-prescribed to his requested pharmacy [facility] in [place]. The pt has a Telehealth virtual  intake for ongoing therapy and a referral for ongoing med management at the [facility] in [place] on 5/29/25 at 9:15 am  via Doxy with appt sent to the pt's phone.  The pt is aware that he can contact Dr Meng at [facility] at tel [phone] with any further questions or concerns .

## 2025-05-28 NOTE — BH INPATIENT PSYCHIATRY PROGRESS NOTE - NSDCCRITERIA_PSY_ALL_CORE
acute resolution of the pt's severe affective lability  and OCD symptoms

## 2025-05-28 NOTE — BH INPATIENT PSYCHIATRY DISCHARGE NOTE - OTHER
less affective lability improving " I feel good." variable with approach / avoidance behavior  at times

## 2025-05-28 NOTE — BH TREATMENT PLAN - NSTXCOPEINTERPR_PSY_ALL_CORE
Pt encouraged to attend 1-2 therapy groups per day to develop and improve coping skills.
Pt scheduled  for discharge today

## 2025-05-28 NOTE — BH INPATIENT PSYCHIATRY PROGRESS NOTE - NSBHFUPINTERVALHXFT_PSY_A_CORE
Patient is visible in the unit, dressed in a hospital gown.  He walks up and down floors.  Patient continued to ask about discharge planning and wants to know if he can be discharged next week.    Patient reports compliance with medical treatment and he takes medication.  He reports feeling better.  He is still mildly anxious but denies being depressed.  He minimizes intrusive thoughts and states that relationship with father is very good so he can go back home when he is ready.    Patient denies hearing voices seeing things and he does not have any thoughts of harming self or somebody else.
05/26/2025: Patient was seen today AM in his room, prefers to sty indoors and limited cooperation with peers. He refused to take Vraylar 1.5 mg daily as he feels that he had a reaction e.g. Tongue tied and for this reason he stopped the Vraylar for past 2 days. He was given earlier a dose of Benadryl 50 mg x 1 dose PO. He refused to rescind the 3-D letter for discharge as he feels that he would do better with Out-patient f/u on discharge.    Patient is visible in the unit, dressed in a hospital gown.  He walks up and down floors.  Patient continued to ask about discharge planning and wants to know if he can be discharged next week.    Patient reports compliance with medical treatment and he takes medication.  He reports feeling better.  He is still mildly anxious but denies being depressed.  He minimizes intrusive thoughts and states that relationship with father is very good so he can go back home when he is ready.    Patient denies hearing voices seeing things and he does not have any thoughts of harming self or somebody else.
  Patient is visible in this morning asking to be discharged.  When I explained that sign out from his psychiatrist is that he is requested to be discharged will not be granted over the weekend patient replied that he is okay with that and he understands.    After education that he can retract his cervical gallbladder will be retained against his will he says that it does not matter because he thinks he will be discharged next week anyway.    Patient minimizes recent hospitalization saying he is only anxious and get panic attacks and has problems sleeping.  He also said he gets sometimes obsessive thoughts but denies thoughts of harming himself, denies being depressed.    He also stated he went [place] which is the reason why he rubs this time and he ended up being hospitalized again.  He continues to reside his father and stated that there are no issues between 2 of them.    Patient denies hearing voices or seeing things.            
05/26/2025: Patient was seen today AM in his room, prefers to sty indoors and limited cooperation with peers. He refused to take Vraylar 1.5 mg daily as he feels that he had a reaction e.g. Tongue tied and for this reason he stopped the Vraylar for past 2 days. He was given earlier a dose of Benadryl 50 mg x 1 dose PO. He refused to rescind the 3-D letter for discharge as he feels that he would do better with Out-patient f/u on discharge.    Patient is visible in the unit, dressed in a hospital gown.  He walks up and down floors.  Patient continued to ask about discharge planning and wants to know if he can be discharged next week.    Patient reports compliance with medical treatment and he takes medication.  He reports feeling better.  He is still mildly anxious but denies being depressed.  He minimizes intrusive thoughts and states that relationship with father is very good so he can go back home when he is ready.    Patient denies hearing voices seeing things and he does not have any thoughts of harming self or somebody else.
05/26/2025: Patient was seen today AM in his room, prefers to sty indoors and limited cooperation with peers. He refused to take Vraylar 1.5 mg daily as he feels that he had a reaction e.g. Tongue tied and for this reason he stopped the Vraylar for past 2 days. He was given earlier a dose of Benadryl 50 mg x 1 dose PO. He refused to rescind the 3-D letter for discharge as he feels that he would do better with Out-patient f/u on discharge.    Patient is visible in the unit, dressed in a hospital gown.  He walks up and down floors.  Patient continued to ask about discharge planning and wants to know if he can be discharged next week.    Patient reports compliance with medical treatment and he takes medication.  He reports feeling better.  He is still mildly anxious but denies being depressed.  He minimizes intrusive thoughts and states that relationship with father is very good so he can go back home when he is ready.    Patient denies hearing voices seeing things and he does not have any thoughts of harming self or somebody else.
5/23/25 Pt seen alone and later with his father in the day room. As above,  the pt. with OCD and a mood d/o along with ADHD and suspected ASD symptoms had himself asked for the numerous room changes in the context of his OCD and anxious mood lability. Pt now in a single room and adjusting w/o further issues.  Pt had required a CODE GRAY emergency assistance with emergency medication including Ativan 2 mg IM after the pt had become severely and suddenly agitated and he had begun banging things around in his room and was unable to be calmed and distracted with staff verbal support and efforts to deescalate the pt. The pt responded well to emergency IM Haldol 5 mg / Ativan 2 mg IM and Benadryl 50 mg IM.       Writer discussed with the pt and his father the pt's presumptive diagnoses of pt affective d/o with marked mood volatility, ADHD, OCD  and suspected ASD features and the plan to address these disorders with combination medication ( Vraylar 1.5 mg po qhs to begin tonight and recommended low dose Ativan standing ( pt currently undecided) to alleviate the pt's severe and functionally impairing anxious distress exacerbated by his OCD. The pt will be encouraged to attend therapy groups as tolerated and CBT techniques ( e.g. limiting pt shower time to under 1 hr) to be reviewed as well to further aid the pt's progress. The pt currently denies any SI / HI /AH /VH.  The pt submitted a 72 hr letter on 5/22/25 but writer informed staff and the pt and pt's  father that this letter will not be accepted at this time due to the functionally impairing nature of the pt's mood and anxiety symptoms.

## 2025-05-28 NOTE — BH TREATMENT PLAN - NSCMSPTSTRENGTHS_PSY_ALL_CORE
Assertive/Courageous/Expressive of emotions/Future/goal oriented/Intelligence/Motivated/Physically healthy/Resourceful/Self-reliant/Strong support system/Supportive family

## 2025-05-28 NOTE — BH DISCHARGE NOTE NURSING/SOCIAL WORK/PSYCH REHAB - FINANCIAL ASSISTANCE
[facility] provides services at a reduced cost to those who are determined to be eligible through [facility]’s financial assistance program. Information regarding [facility]’s financial assistance program can be found by going to [email or web address] or by calling [phone].

## 2025-05-28 NOTE — BH TREATMENT PLAN - NSTXPLANTHERAPYSESSIONSFT_PSY_ALL_CORE
05-26-25  Type of therapy: Creative arts therapy, Music therapy, Group Games   Type of session: Group  Level of patient participation: Participates  Duration of participation: 60 minutes  Therapy conducted by: Psych rehab  Therapy Summary: Attended all group programming today.    05-27-25  --  --  --  --  --  Therapy Summary: Pt attending select groups, visible on the unit    05-27-25  Type of therapy: Safety planning  Type of session: Individual  Level of patient participation: Participates  Duration of participation: 30 minutes  Therapy conducted by: Psych rehab  Therapy Summary: Pt met to complete safety plan. Pt able to identify warning signs, triggers, coping strategies, and supports.

## 2025-05-28 NOTE — BH INPATIENT PSYCHIATRY DISCHARGE NOTE - REASON FOR ADMISSION
worsening anxiety and affective volatility at home in the context of his OCD and his comorbid mood d/o

## 2025-05-28 NOTE — BH INPATIENT PSYCHIATRY PROGRESS NOTE - NSBHCHARTREVIEWVS_PSY_A_CORE FT
Vital Signs Last 24 Hrs  T(C): 36.6 (05-23-25 @ 07:48), Max: 36.6 (05-23-25 @ 07:48)  T(F): 97.9 (05-23-25 @ 07:48), Max: 97.9 (05-23-25 @ 07:48)  HR: --  BP: --  BP(mean): --  RR: 18 (05-23-25 @ 07:48) (18 - 18)  SpO2: 100% (05-23-25 @ 07:48) (100% - 100%)    Orthostatic VS  05-23-25 @ 07:48  Lying BP: --/-- HR: --  Sitting BP: 125/72 HR: 87  Standing BP: 111/72 HR: 102  Site: upper right arm  Mode: electronic  Orthostatic VS  05-22-25 @ 07:40  Lying BP: --/-- HR: --  Sitting BP: 114/72 HR: 78  Standing BP: 116/71 HR: 75  Site: upper right arm  Mode: electronic  
Vital Signs Last 24 Hrs  T(C): 36.6 (05-25-25 @ 08:36), Max: 36.6 (05-25-25 @ 08:36)  T(F): 97.9 (05-25-25 @ 08:36), Max: 97.9 (05-25-25 @ 08:36)  HR: --  BP: --  BP(mean): --  RR: 18 (05-25-25 @ 08:36) (18 - 18)  SpO2: 100% (05-25-25 @ 08:36) (100% - 100%)    Orthostatic VS  05-25-25 @ 08:36  Lying BP: --/-- HR: --  Sitting BP: 128/67 HR: 89  Standing BP: 111/60 HR: 107  Site: --  Mode: --  Orthostatic VS  05-24-25 @ 07:33  Lying BP: --/-- HR: --  Sitting BP: 109/58 HR: 97  Standing BP: 115/70 HR: 102  Site: upper right arm  Mode: auscultated w/ stethoscope  
Vital Signs Last 24 Hrs  T(C): 36.6 (05-24-25 @ 07:33), Max: 36.6 (05-24-25 @ 07:33)  T(F): 97.9 (05-24-25 @ 07:33), Max: 97.9 (05-24-25 @ 07:33)  HR: --  BP: --  BP(mean): --  RR: 18 (05-24-25 @ 07:33) (18 - 18)  SpO2: 100% (05-24-25 @ 07:33) (100% - 100%)    Orthostatic VS  05-24-25 @ 07:33  Lying BP: --/-- HR: --  Sitting BP: 109/58 HR: 97  Standing BP: 115/70 HR: 102  Site: upper right arm  Mode: auscultated w/ stethoscope  Orthostatic VS  05-23-25 @ 07:48  Lying BP: --/-- HR: --  Sitting BP: 125/72 HR: 87  Standing BP: 111/72 HR: 102  Site: upper right arm  Mode: electronic  
Vital Signs Last 24 Hrs  T(C): 36.5 (05-26-25 @ 08:09), Max: 36.5 (05-26-25 @ 08:09)  T(F): 97.7 (05-26-25 @ 08:09), Max: 97.7 (05-26-25 @ 08:09)  HR: --  BP: --  BP(mean): --  RR: 18 (05-26-25 @ 08:09) (18 - 18)  SpO2: 100% (05-26-25 @ 08:09) (100% - 100%)    Orthostatic VS  05-26-25 @ 08:09  Lying BP: --/-- HR: --  Sitting BP: 131/73 HR: 70  Standing BP: 126/79 HR: 78  Site: upper right arm  Mode: electronic  Orthostatic VS  05-25-25 @ 08:36  Lying BP: --/-- HR: --  Sitting BP: 128/67 HR: 89  Standing BP: 111/60 HR: 107  Site: --  Mode: --  
Vital Signs Last 24 Hrs  T(C): 36.7 (05-28-25 @ 07:28), Max: 36.7 (05-28-25 @ 07:28)  T(F): 98.1 (05-28-25 @ 07:28), Max: 98.1 (05-28-25 @ 07:28)  HR: --  BP: --  BP(mean): --  RR: 18 (05-28-25 @ 07:28) (18 - 18)  SpO2: 100% (05-28-25 @ 07:28) (100% - 100%)    Orthostatic VS  05-28-25 @ 07:28  Lying BP: --/-- HR: --  Sitting BP: 124/77 HR: 79  Standing BP: 115/67 HR: 91  Site: upper left arm  Mode: electronic  Orthostatic VS  05-27-25 @ 06:50  Lying BP: --/-- HR: --  Sitting BP: 126/51 HR: 93  Standing BP: 112/70 HR: 96  Site: upper right arm  Mode: electronic  
Vital Signs Last 24 Hrs  T(C): 36.8 (05-27-25 @ 06:50), Max: 36.8 (05-27-25 @ 06:50)  T(F): 98.3 (05-27-25 @ 06:50), Max: 98.3 (05-27-25 @ 06:50)  HR: --  BP: --  BP(mean): --  RR: 16 (05-27-25 @ 06:50) (16 - 16)  SpO2: 100% (05-27-25 @ 06:50) (100% - 100%)    Orthostatic VS  05-27-25 @ 06:50  Lying BP: --/-- HR: --  Sitting BP: 126/51 HR: 93  Standing BP: 112/70 HR: 96  Site: upper right arm  Mode: electronic  Orthostatic VS  05-26-25 @ 08:09  Lying BP: --/-- HR: --  Sitting BP: 131/73 HR: 70  Standing BP: 126/79 HR: 78  Site: upper right arm  Mode: electronic

## 2025-05-28 NOTE — BH INPATIENT PSYCHIATRY DISCHARGE NOTE - NSDCCPCAREPLAN_GEN_ALL_CORE_FT
PRINCIPAL DISCHARGE DIAGNOSIS  Diagnosis: Affective disorder  Assessment and Plan of Treatment:       SECONDARY DISCHARGE DIAGNOSES  Diagnosis: OCD (obsessive compulsive disorder)  Assessment and Plan of Treatment:

## 2025-05-28 NOTE — BH INPATIENT PSYCHIATRY DISCHARGE NOTE - HOSPITAL COURSE
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 and has been unemployed for several months, denies PMH, with Past PH of OCD, ADHD, Panic Disorder, unspecified mood/psychotic disorder, prior psych admissions, reports he was discharged last month from a hospital in [place] and before that was at  2/7-2/18/25, denies prior SA, +hx of SIB by hitting self(last did a weeks ago), denies hx of overt aggression, but father did have an OOP against him due to throwing out father's belongings, denies active substance use, not currently connected to care or on meds, who self-presents to the ED accompanied by father for recent mood and anxiety symptoms.   Per ED Telepsychiatry evaluation on 5/21/25:   Chart review indicates the pt was recently living in [place] for two months with his mother, where he was unable to attain work or set up a life for himself, and subsequently returned to [place]. The pt has reportedly been experiencing severe anxiety with panic attacks, has been more labile, and has had SI without a clear plan. Collateral obtained from father also indicates the pt's been making more impulsive/rash decisions and throwing out items at home.   On this writer's assessment, the pt is A & O x4, presents as anxious and constricted, with no e/o internal preoccupation, and states he came to the hospital seeking admission to restart medication to target his recent anxiety and mood symptoms. He elaborates stating "I've been yelling an screaming", states he's "not able to function", has been engaging in self harm behaviors by hitting himself, and has had recurrent active SI, but no plan/intent. The pt states he was discharged from the hospital last month on Ativan, but currently has no psychiatric care set up in [place] and is interested in inpatient admission. On ROS, the pt denies recent HI, AH /VH, PI, aggression, or substance use.  [facility] called father for collateral, but was unable to reach him.     The pt was subsequently admitted to [address] inpatient psychiatry on 5/21/25 on a 9.13 voluntary legal status for acute pt safety and for ongoing pt assessment and treatment of his especially functionally impairing OCD. When the pt was seen by this writer on 5N today 5/22/25, the pt continued to appear very tense and brimming with inner turmoil. The pt appeared to struggle with making even the simplest of decisions and later submitted a 72 hr letter requesting his DC from hospital after just having expressed his wish to be readmitted as   above. Informed consent obtained from the pt for a trial of low dose Vraylar 1.5 mg po q am  in an effort to alleviate the pt's longstanding h/o  affective volatility and associated thought disorganization.  This writer discussed plan with the pt to continue prn low dose Ativan for anxiety as the pt chose not to begin a low dose standing Ativan regimen  in light of the pt's significant anxiety. The pt continued to struggle with even the simplest decisions but he was overall able to maintain his emotional equilibrium. He denied any current SI /HI /AH.     5/24/25:  As per sign out patient submitted 72 hours later but he was also educated and explained that his request will not be granted over the weekend.  Patient states that he knows that he will be discharged and he is comfortable with the plan.  He states that for him he does not make any difference as he is voluntary or involuntary because he is doctor plans to discharge him next week anyway.    Patient minimizes recent hospitalization saying he is only anxious and his insomnia otherwise he does not need any help.  No major behavioral issues observed or reported.  No agitation.  Patient is not responding to internal stimuli.  5/25/25:  Patient is adherent to treatment, he takes medication and participates in unit activities.  He denies being depressed but continues to be anxious.  Minimizes his intrusive thoughts and denies hearing voices seeing things.  Patient does not have thoughts of harming self or somebody else.  He has risk factors mitigated to low with inpatient structure and medication as well as supportive psychotherapy.  5/27/25 Pt seen and discussed with treatment team.  The pt continues to clinically improve with monotherapy with low dose Ativan for anxiety/ OCD. The pt continues to decline Vraylar or any other antidepressant or mood stabilizer. The pt's father has been apprised of the pt's aftercare plan and his aftercare treatment with intake to be scheduled at [facility] in [place] for pt referral to ongoing therapy and med management. The pt is sleeping and eating well and he has been visible on the unit and attending therapy groups with staff support and ongoing encouragement. The pt denies any current SI /HI /AH and he presents as more future oriented and ready to return to work.   5/28/25 Pt seen and discussed with treatment team prior to the pt's DC home from hospital on 5/28/25. The pt continues to show modest clinical improvement with now low dose standing Ativan 1 mg po bid which the pt has now agreed to take to alleviate some of the pt's mood and OCD related anxiety. The pt continues to decline any other medication trials of any other medication group including mood stabilizers , antidepressants or antipsychotic medications, all of which , if properly titrated could potentially begin to alleviate the pt's significant and functionally impairing mood d/o and OCD. The pt has been more visible on the unit, still anxious but in less overall overt distress since the pt has agreed to take standing low dose Ativan as above. The pt is sleeping and eating more consistently and he continues to deny any current SI / HI /AH / VH. The pt remains future oriented and he is excited about returning home.     Clinical handoff has been completed by hospital staff and the pt's medications have been e-prescribed to his requested pharmacy [facility] in [place]. The pt has a Telehealth virtual  intake for ongoing therapy and a referral for ongoing med management at the [facility] in [place] on 5/29/25 at 9:15 am  via Doxy with appt sent to the pt's phone.  The pt is aware that he can contact Dr Meng at [facility] at tel [phone] with any further questions or concerns .

## 2025-05-28 NOTE — BH INPATIENT PSYCHIATRY PROGRESS NOTE - NSBHMSEMOOD_PSY_A_CORE
Anxious
Other
Anxious
Other
Modify Regimen: pharmacy compounding accessory \\nSig: please compound 2%5FU In PG solution. Apply to affected areas (chest) twice daily x 2 weeks use as needed
Detail Level: Simple

## 2025-05-31 NOTE — CHART NOTE - NSCHARTNOTEFT_GEN_A_CORE
Attempted outreach to follow up with patient after discharge. No answer and voicemail box not set up yet.
Follow up call made at [phone]. Pt answered the phone and shared that he was doing well and made it home safe. He took down the unit telephone number and was encouraged to call if he has any questions or concerns.
MARY received call from Sarah of [facility] indicating that she was unable to connect with pt for his appt today and she wanted to confirm pt's phone number. SW called Sarah and left a message with pt's correct number. Sarah later called back indicating that she spoke w/ pt. Per Sarah, pt he indicated that he was not interested in services with [facility] and planned to see a private provider. SW then reached out to pt. Pt endorsed that he was not interested in [facility] and planned to find private provider. Per pt, he is looking into [facility]. SW also discussed [facility] as an alternative. SW offered to make pt an alternate referral, but pt indicated he wanted to follow up on his own. SW encouraged pt to continue w/ [facility] until he could link to an alternate provider. Pt was not receptive. Pt denied SI/HI and reported he was doing well and is taking his medication. SW discussed w/ pt that in the event of worsening symptoms including SI/HI pt should return to the hospital and call 911 for any safety concerns. Pt verbalized understanding. After speaking with pt, MARY discussed case with psychiatrist who did not indicate that any additional follow up was needed and indicated [facility] case can be closed.
Pt discharged to home yesterday with aftercare scheduled at [facility]. Pt's father provided transport to ensure safe arrival home. Pt's DC summary faxed to [facility] for continuity of care.
Follow up call attempt made at [phone]. Call was sent to voicemail and voicemail was left encouraging to call back at unit phone number.

## 2025-05-31 NOTE — CHART NOTE - NSCHARTNOTESELECT_GEN_ALL_CORE
Follow Up Call/Event Note
Event Note
Follow Up Call/Event Note
Follow Up/Event Note
Post-Discharge Note

## 2025-06-04 DIAGNOSIS — Z56.0 UNEMPLOYMENT, UNSPECIFIED: ICD-10-CM

## 2025-06-04 DIAGNOSIS — F29 UNSPECIFIED PSYCHOSIS NOT DUE TO A SUBSTANCE OR KNOWN PHYSIOLOGICAL CONDITION: ICD-10-CM

## 2025-06-04 DIAGNOSIS — F39 UNSPECIFIED MOOD [AFFECTIVE] DISORDER: ICD-10-CM

## 2025-06-04 DIAGNOSIS — F42.9 OBSESSIVE-COMPULSIVE DISORDER, UNSPECIFIED: ICD-10-CM

## 2025-06-04 DIAGNOSIS — R45.851 SUICIDAL IDEATIONS: ICD-10-CM

## 2025-06-04 DIAGNOSIS — R00.1 BRADYCARDIA, UNSPECIFIED: ICD-10-CM

## 2025-06-04 SDOH — ECONOMIC STABILITY - INCOME SECURITY: UNEMPLOYMENT, UNSPECIFIED: Z56.0

## 2025-06-09 PROCEDURE — 90792 PSYCH DIAG EVAL W/MED SRVCS: CPT | Mod: 95

## 2025-06-13 ENCOUNTER — EMERGENCY (EMERGENCY)
Facility: HOSPITAL | Age: 30
LOS: 0 days | Discharge: OTHER INSTITUTION | End: 2025-06-13
Attending: STUDENT IN AN ORGANIZED HEALTH CARE EDUCATION/TRAINING PROGRAM
Payer: MEDICAID

## 2025-06-13 VITALS
TEMPERATURE: 99 F | DIASTOLIC BLOOD PRESSURE: 63 MMHG | HEIGHT: 68 IN | OXYGEN SATURATION: 98 % | RESPIRATION RATE: 18 BRPM | SYSTOLIC BLOOD PRESSURE: 108 MMHG | HEART RATE: 57 BPM | WEIGHT: 149.91 LBS

## 2025-06-13 VITALS
SYSTOLIC BLOOD PRESSURE: 116 MMHG | RESPIRATION RATE: 17 BRPM | DIASTOLIC BLOOD PRESSURE: 63 MMHG | HEART RATE: 81 BPM | OXYGEN SATURATION: 95 % | TEMPERATURE: 98 F

## 2025-06-13 DIAGNOSIS — F32.A DEPRESSION, UNSPECIFIED: ICD-10-CM

## 2025-06-13 DIAGNOSIS — F42.9 OBSESSIVE-COMPULSIVE DISORDER, UNSPECIFIED: ICD-10-CM

## 2025-06-13 DIAGNOSIS — F90.9 ATTENTION-DEFICIT HYPERACTIVITY DISORDER, UNSPECIFIED TYPE: ICD-10-CM

## 2025-06-13 DIAGNOSIS — F41.0 PANIC DISORDER [EPISODIC PAROXYSMAL ANXIETY]: ICD-10-CM

## 2025-06-13 LAB
ADD ON TEST-SPECIMEN IN LAB: SIGNIFICANT CHANGE UP
ALBUMIN SERPL ELPH-MCNC: 4.1 G/DL — SIGNIFICANT CHANGE UP (ref 3.3–5)
ALP SERPL-CCNC: 50 U/L — SIGNIFICANT CHANGE UP (ref 40–120)
ALT FLD-CCNC: 24 U/L — SIGNIFICANT CHANGE UP (ref 12–78)
AMPHET UR-MCNC: NEGATIVE — SIGNIFICANT CHANGE UP
ANION GAP SERPL CALC-SCNC: 7 MMOL/L — SIGNIFICANT CHANGE UP (ref 5–17)
APAP SERPL-MCNC: <2 UG/ML — LOW (ref 10–30)
APPEARANCE UR: CLEAR — SIGNIFICANT CHANGE UP
AST SERPL-CCNC: 21 U/L — SIGNIFICANT CHANGE UP (ref 15–37)
BARBITURATES UR SCN-MCNC: NEGATIVE — SIGNIFICANT CHANGE UP
BASOPHILS # BLD AUTO: 0.04 K/UL — SIGNIFICANT CHANGE UP (ref 0–0.2)
BASOPHILS NFR BLD AUTO: 0.6 % — SIGNIFICANT CHANGE UP (ref 0–2)
BENZODIAZ UR-MCNC: NEGATIVE — SIGNIFICANT CHANGE UP
BILIRUB DIRECT SERPL-MCNC: <0.1 MG/DL — SIGNIFICANT CHANGE UP (ref 0–0.3)
BILIRUB INDIRECT FLD-MCNC: >0.3 MG/DL — SIGNIFICANT CHANGE UP (ref 0.2–1)
BILIRUB SERPL-MCNC: 0.4 MG/DL — SIGNIFICANT CHANGE UP (ref 0.2–1.2)
BILIRUB UR-MCNC: NEGATIVE — SIGNIFICANT CHANGE UP
BUN SERPL-MCNC: 19 MG/DL — SIGNIFICANT CHANGE UP (ref 7–23)
CALCIUM SERPL-MCNC: 9.5 MG/DL — SIGNIFICANT CHANGE UP (ref 8.5–10.1)
CHLORIDE SERPL-SCNC: 106 MMOL/L — SIGNIFICANT CHANGE UP (ref 96–108)
CO2 SERPL-SCNC: 25 MMOL/L — SIGNIFICANT CHANGE UP (ref 22–31)
COCAINE METAB.OTHER UR-MCNC: NEGATIVE — SIGNIFICANT CHANGE UP
COLOR SPEC: YELLOW — SIGNIFICANT CHANGE UP
CREAT SERPL-MCNC: 1.04 MG/DL — SIGNIFICANT CHANGE UP (ref 0.5–1.3)
DIFF PNL FLD: NEGATIVE — SIGNIFICANT CHANGE UP
EGFR: 99 ML/MIN/1.73M2 — SIGNIFICANT CHANGE UP
EGFR: 99 ML/MIN/1.73M2 — SIGNIFICANT CHANGE UP
EOSINOPHIL # BLD AUTO: 0.02 K/UL — SIGNIFICANT CHANGE UP (ref 0–0.5)
EOSINOPHIL NFR BLD AUTO: 0.3 % — SIGNIFICANT CHANGE UP (ref 0–6)
ETHANOL SERPL-MCNC: <10 MG/DL — SIGNIFICANT CHANGE UP (ref 0–10)
FENTANYL UR QL SCN: NEGATIVE — SIGNIFICANT CHANGE UP
FLUAV AG NPH QL: SIGNIFICANT CHANGE UP
FLUBV AG NPH QL: SIGNIFICANT CHANGE UP
GLUCOSE SERPL-MCNC: 83 MG/DL — SIGNIFICANT CHANGE UP (ref 70–99)
GLUCOSE UR QL: NEGATIVE MG/DL — SIGNIFICANT CHANGE UP
HCT VFR BLD CALC: 45.6 % — SIGNIFICANT CHANGE UP (ref 39–50)
HGB BLD-MCNC: 15.2 G/DL — SIGNIFICANT CHANGE UP (ref 13–17)
IMM GRANULOCYTES # BLD AUTO: 0.02 K/UL — SIGNIFICANT CHANGE UP (ref 0–0.07)
IMM GRANULOCYTES NFR BLD AUTO: 0.3 % — SIGNIFICANT CHANGE UP (ref 0–0.9)
KETONES UR QL: ABNORMAL MG/DL
LEUKOCYTE ESTERASE UR-ACNC: NEGATIVE — SIGNIFICANT CHANGE UP
LYMPHOCYTES # BLD AUTO: 1.39 K/UL — SIGNIFICANT CHANGE UP (ref 1–3.3)
LYMPHOCYTES NFR BLD AUTO: 20 % — SIGNIFICANT CHANGE UP (ref 13–44)
MCHC RBC-ENTMCNC: 29.8 PG — SIGNIFICANT CHANGE UP (ref 27–34)
MCHC RBC-ENTMCNC: 33.3 G/DL — SIGNIFICANT CHANGE UP (ref 32–36)
MCV RBC AUTO: 89.4 FL — SIGNIFICANT CHANGE UP (ref 80–100)
METHADONE UR-MCNC: NEGATIVE — SIGNIFICANT CHANGE UP
MONOCYTES # BLD AUTO: 0.4 K/UL — SIGNIFICANT CHANGE UP (ref 0–0.9)
MONOCYTES NFR BLD AUTO: 5.7 % — SIGNIFICANT CHANGE UP (ref 2–14)
NEUTROPHILS # BLD AUTO: 5.09 K/UL — SIGNIFICANT CHANGE UP (ref 1.8–7.4)
NEUTROPHILS NFR BLD AUTO: 73.1 % — SIGNIFICANT CHANGE UP (ref 43–77)
NITRITE UR-MCNC: NEGATIVE — SIGNIFICANT CHANGE UP
NRBC # BLD AUTO: 0 K/UL — SIGNIFICANT CHANGE UP (ref 0–0)
NRBC # FLD: 0 K/UL — SIGNIFICANT CHANGE UP (ref 0–0)
NRBC BLD AUTO-RTO: 0 /100 WBCS — SIGNIFICANT CHANGE UP (ref 0–0)
OPIATES UR-MCNC: NEGATIVE — SIGNIFICANT CHANGE UP
PCP SPEC-MCNC: SIGNIFICANT CHANGE UP
PCP UR-MCNC: NEGATIVE — SIGNIFICANT CHANGE UP
PH UR: 6.5 — SIGNIFICANT CHANGE UP (ref 5–8)
PLATELET # BLD AUTO: 247 K/UL — SIGNIFICANT CHANGE UP (ref 150–400)
PMV BLD: 9.1 FL — SIGNIFICANT CHANGE UP (ref 7–13)
POTASSIUM SERPL-MCNC: 3.9 MMOL/L — SIGNIFICANT CHANGE UP (ref 3.5–5.3)
POTASSIUM SERPL-SCNC: 3.9 MMOL/L — SIGNIFICANT CHANGE UP (ref 3.5–5.3)
PROT SERPL-MCNC: 7 GM/DL — SIGNIFICANT CHANGE UP (ref 6–8.3)
PROT UR-MCNC: NEGATIVE MG/DL — SIGNIFICANT CHANGE UP
RBC # BLD: 5.1 M/UL — SIGNIFICANT CHANGE UP (ref 4.2–5.8)
RBC # FLD: 12.1 % — SIGNIFICANT CHANGE UP (ref 10.3–14.5)
RSV RNA NPH QL NAA+NON-PROBE: SIGNIFICANT CHANGE UP
SALICYLATES SERPL-MCNC: <1.7 MG/DL — LOW (ref 2.8–20)
SARS-COV-2 RNA SPEC QL NAA+PROBE: SIGNIFICANT CHANGE UP
SODIUM SERPL-SCNC: 138 MMOL/L — SIGNIFICANT CHANGE UP (ref 135–145)
SOURCE RESPIRATORY: SIGNIFICANT CHANGE UP
SP GR SPEC: 1.02 — SIGNIFICANT CHANGE UP (ref 1–1.03)
THC UR QL: NEGATIVE — SIGNIFICANT CHANGE UP
UROBILINOGEN FLD QL: 1 MG/DL — SIGNIFICANT CHANGE UP (ref 0.2–1)
WBC # BLD: 6.96 K/UL — SIGNIFICANT CHANGE UP (ref 3.8–10.5)
WBC # FLD AUTO: 6.96 K/UL — SIGNIFICANT CHANGE UP (ref 3.8–10.5)

## 2025-06-13 PROCEDURE — 80076 HEPATIC FUNCTION PANEL: CPT

## 2025-06-13 PROCEDURE — 85025 COMPLETE CBC W/AUTO DIFF WBC: CPT

## 2025-06-13 PROCEDURE — 93005 ELECTROCARDIOGRAM TRACING: CPT

## 2025-06-13 PROCEDURE — 80048 BASIC METABOLIC PNL TOTAL CA: CPT

## 2025-06-13 PROCEDURE — 81003 URINALYSIS AUTO W/O SCOPE: CPT

## 2025-06-13 PROCEDURE — 99284 EMERGENCY DEPT VISIT MOD MDM: CPT | Mod: 25

## 2025-06-13 PROCEDURE — 93010 ELECTROCARDIOGRAM REPORT: CPT

## 2025-06-13 PROCEDURE — 99285 EMERGENCY DEPT VISIT HI MDM: CPT

## 2025-06-13 PROCEDURE — 36415 COLL VENOUS BLD VENIPUNCTURE: CPT

## 2025-06-13 PROCEDURE — 80307 DRUG TEST PRSMV CHEM ANLYZR: CPT

## 2025-06-13 PROCEDURE — 0241U: CPT

## 2025-06-13 NOTE — ED ADULT NURSE NOTE - NSFALLUNIVINTERV_ED_ALL_ED
Bed/Stretcher in lowest position, wheels locked, appropriate side rails in place/Call bell, personal items and telephone in reach/Instruct patient to call for assistance before getting out of bed/chair/stretcher/Non-slip footwear applied when patient is off stretcher/[place] to call system/Physically safe environment - no spills, clutter or unnecessary equipment/Purposeful proactive rounding/Room/bathroom lighting operational, light cord in reach

## 2025-06-13 NOTE — ED BEHAVIORAL HEALTH ASSESSMENT NOTE - PATIENT'S CHIEF COMPLAINT
"I've been having panic attacks on my own in my house and I don't feel content or able to function when I do."

## 2025-06-13 NOTE — ED PROVIDER NOTE - PHYSICAL EXAMINATION
Constitutional: well appearing, NAD AAOx3  Eyes: EOMI, PERRL  Head: Normocephalic atraumatic  Mouth: no airway obstruction, posterior oropharynx clear without erythema or exudate  Neck: supple  Cardiac: regular rate and rhythm, no MRG  Resp: Lungs CTAB  GI: Abd s/nt/nd  Neuro: CN2-12 intact, strength 5/5x4, sensation grossly intact  Skin: No rashes  PSYCH: calm, cooperative, no si hi ah vh

## 2025-06-13 NOTE — ED ADULT NURSE REASSESSMENT NOTE - NS ED NURSE REASSESS COMMENT FT1
Plan of care reviewed, pt going to [facility], pt placed on 1:1. Belongings secured, medication to pharmacy, metal scan complete.

## 2025-06-13 NOTE — ED ADULT TRIAGE NOTE - CHIEF COMPLAINT QUOTE
Pt bibems from home c/o panic attack. Took prescribed PO Ativan. Denies SI/HI/ AH/VH. Pt A&ox4, ambulatory, no s/s of distress.

## 2025-06-13 NOTE — ED BEHAVIORAL HEALTH ASSESSMENT NOTE - DETAILS
as per HPI afterhours and detailed documentation from admission available via shared EMR  5/21-5/28/2025 self referred Pt reports a dystonic reaction w/ Vraylar & dependence w/ Adderall Deferred

## 2025-06-13 NOTE — ED BEHAVIORAL HEALTH ASSESSMENT NOTE - NSACTIVEVENT_PSY_ALL_CORE
recent loss of employment/Triggering events leading to humiliation, shame, and/or despair (e.g., Loss of relationship, financial or health status) (real or anticipated)

## 2025-06-13 NOTE — ED ADULT TRIAGE NOTE - WEIGHT IN KG
68 Thalidomide Pregnancy And Lactation Text: This medication is Pregnancy Category X and is absolutely contraindicated during pregnancy. It is unknown if it is excreted in breast milk.

## 2025-06-13 NOTE — ED PROVIDER NOTE - CLINICAL SUMMARY MEDICAL DECISION MAKING FREE TEXT BOX
Presentation consistent with anxiety, panic attacks. Plan for medical clearance, psych consult Presentation consistent with anxiety, panic attacks. Plan for medical clearance, psych consult  Pt medically cleared, pending psych consult Presentation consistent with anxiety, panic attacks. Plan for medical clearance, psych consult  Pt medically cleared, pending psych consult. Pt evaluated by telepsych attending Dr. Goddard who recommends voluntary admission to [facility], accepted by Dr. Smalls

## 2025-06-13 NOTE — ED PROVIDER NOTE - OBJECTIVE STATEMENT
29yoM PMH anxiety, depression, ADHD with recent admission to  presents with panic attacks and requesting admission. Pt reports he has frequent panic attacks and cannot function at home. Declined appt with [facility] as he did not like them, was unable to get private outpatient psych appt. No SI HI AH VH. No medical complaints

## 2025-06-13 NOTE — ED BEHAVIORAL HEALTH ASSESSMENT NOTE - HPI (INCLUDE ILLNESS QUALITY, SEVERITY, DURATION, TIMING, CONTEXT, MODIFYING FACTORS, ASSOCIATED SIGNS AND SYMPTOMS)
This is a 29 year old single male, non-caregiver, domiciled with his father, with past psychiatric history of panic disorder, OCD, bipolar vs other affective disorder, ADHD and stimulant dependence (in remission; hx of induced psychosis), not currently in outpatient psychiatric care but prescribed Ativan 1 mg BID from his recent  admission, multiple past psychiatric admissions (last known at  from 5/21-5/28/2025), history of non-suicidal self injury (hitting self when anxious), no known history of suicide attempts or physical aggression, OOP legal hx (previously filed by pt's father iso pt throwing away father's belongings), and no pertinent past medical history otherwise who presents to the ED BIB self-activated EMS s/p panic attacks today endorsing ongoing and worsening anxiety since his last admission, requesting readmission for medication management. Psychiatry consulted for evaluation.     On assessment, patient reports coming into the ED for readmission because "I've been having panic attacks on my own in my house and I don't feel content or able to function when I do." He tells me of his recent admission and being prescribed Ativan which "helps but only temporary." He conveys taking the medication up to twice a day most days as prescribed but also notes taking it "sometimes 3 or 4 times when I'm really feeling anxious so I have gone over the daily number but that's rare." He also reports rare occasions where he doesn't require it at all. 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 He reports having tried Prozac for about a year in the past which didn't help, then more recently during his February  IPP, Lexapro which "helped a lot but" he didn't continue because he wasn't feeling "the gains" as much as he anticipated then he allowed [place]. He conveys having been on starting doses of both of these medications and would be open to trial of either at higher doses or alternative medications if recommended.     On ROS, he reports his "mood has been a little volatile," expressing anxious distress that leads to mood lability. He also tells me that during his IPP "I requested an early discharge and I think that might've been a mistake." He reports "ok" energy, "somewhat low when I have my lows and I'm a little anxious," otherwise fair. He conveys "mostly normal" sleep with a few nights sporadically where sleep is interrupted with difficulty falling back asleep. He reports "normal" appetite. He describes his focus "problematic" which he attributes to anxiety and panic attacks, conveying a sense of dysfunction in his life and difficulty thinking when anxious. He conveys near daily and sometimes multiple times a day panic attacks which he describes as "just like an uncontrollable feeling where I can't think straight" and "I just feel so out of control with my own life." He is immediately grossly anxious and tearful in response to describing a typical panic attack, also conveying "I feel weak and unable to address the things that I need to address" elaborating on sentiments of worthlessness, helplessness and guilt that "I keep digging myself into a hole even more." He affirms obsessive, intrusive thinking in addition to compulsive habits, conveying a obsessive need for a "clean and orderly house," to the extent where "I throw things away then I regret throwing things away." He denies any death wishes, SI, HI, AVH, paranoia or symptoms of antonia. He denies substance use, conveying rare alcohol use at times and remote dependence on Adderall when previously prescribed to him.     Patient is otherwise fixated on psychiatric readmission for medication management, conveying a desperation for management of his symptoms and remorse at ending his most recent admission prematurely. He expresses a plan to return if a bed an [facility] is not available but also conveys openness to alternative inpatient psychiatry locations if there is an available bed elsewhere.

## 2025-06-13 NOTE — ED ADULT NURSE NOTE - IMPULSE CONTROL
After Visit Summary   7/13/2018    Santiago Sales    MRN: [id number]           Patient Information     Date Of Birth          1979        Visit Information        Provider Department      7/13/2018 4:00 PM Timothy Lindsey MD [facility]        Today's Diagnoses     Cellulitis and abscess of leg, except foot        MRSA infection          Care Instructions    1. MODIFIED FAST 250 CALORIES TWICE DAILY FEMALES  300 CALORIES TWICE DAILY FOR MALES   OATMEAL AND COTTAGE CHEESE WORK NICELY   COPIOUS WATER BETWEEN   5/2 PLAN DR JUÁREZ [facility]  NORMAL DIET 5 DAYS PER WEEK  SEMIFAST 2 DAYS PER WEEK  LOOK UP 5-2 PLAN ON THE INTERNET    Weight Loss Tips  1. Do not eat after 6 hrs before your expected bedtime  2. Have your heaviest meal for breakfast, a slightly lighter meal at lunch and a snack 6 hrs before bed  3. No sugar/calorie drinks except milk ie no fruit juice, pop, alcohol.  4. Drink milk OR WATER  30min before meals to decrease your hunger. Also it is excellent as part of your last meal of the day snack  5. Drink lots of water  6. Increase fiber in diet: all bran cereal, salads, popcorn etc  7. Have only one small serving of fruit a day about 1/2 cup (as this is high in sugar)  8. EXERCISE is the bottom line. Without it, you will gain weight even on a low calorie diet. Best if done 2-3X a day as can    2. The only way known to prevent diabetes or keep it from getting worse is exercise, 20-40 minutes 3 times a day around the time of meals      SLEEP 8 HOURS PER DAY    BLACK AND BLUEBERRIES EVERY DAY ANTINFLAMMATORY BENEFIT     PLAIN YOGURT SEVERAL TIMES DAILY AS TOLERATED IF NOT ALLERGIC     AVOID HIGH FRUCTOSE SYRUP AND OLENE IN YOUR DIET     GREEN TEA EXTRACT    PROBIOTIC CAPSULE DAILY  HIGH QUALITY     DON'T EAT LATE AT NIGHT    CARALLUMA FIMBRIATA HELPS WITH APPETITE    KEEP A BLEDBL AcquisitionINGS JOURNAL    888 BREATHER WHEN STRESSED OR COUNT BACK FROM 100  WITH SLOW BREATHING    POSITIVE AFFIRMATIONS         FIT BIT OR PEDOMETER 10,000 STEPS PER DAY     FIT BIT FRIEDAICH RECOMMENDED      (L03.119,  L02.419) Cellulitis and abscess of leg, except foot  Comment: adonay MRSA INFECTION  Plan: clindamycin (CLEOCIN) 300 MG capsule             (A49.02) MRSA infection  Comment:    Plan: mupirocin (BACTROBAN) 2 % ointment, clindamycin        (CLEOCIN) 300 MG capsule                                 Follow-ups after your visit        Your next 10 appointments already scheduled     Aug 29, 2018  3:00 PM CDT   RETURN RETINA with Kenyetta Lerner MD   Eye Clinic ([facility])    [facility] [address] [facility] [address] [place] [id number] [phone]              Who to contact     If you have questions or need follow up information about today's clinic visit or your schedule please contact [facility] directly at [phone].  Normal or non-critical lab and imaging results will be communicated to you by MyChart, letter or phone within 4 business days after the clinic has received the results. If you do not hear from us within 7 days, please contact the clinic through MyChart or phone. If you have a critical or abnormal lab result, we will notify you by phone as soon as possible.  Submit refill requests through [employer] or call your pharmacy and they will forward the refill request to us. Please allow 3 business days for your refill to be completed.          Additional Information About Your Visit        Care EveryWhere ID     This is your Care EveryWhere ID. This could be used by other organizations to access your [facility] medical records  [id number]        Your Vitals Were     Pulse Temperature Respirations Pulse Oximetry BMI (Body Mass Index)       71 97.5  F (36.4  C) 16 100% 40.75 kg/m2        Blood Pressure from Last 3 Encounters:   07/13/18 112/76   06/22/18 128/76   05/22/18 114/82    Weight from  Last 3 Encounters:   07/13/18 284 lb (128.8 kg)   06/22/18 288 lb (130.6 kg)   05/22/18 285 lb (129.3 kg)              Today, you had the following     No orders found for display         Today's Medication Changes          These changes are accurate as of 7/13/18  4:26 PM.  If you have any questions, ask your nurse or doctor.               Start taking these medicines.        Dose/Directions    clindamycin 300 MG capsule   Commonly known as:  CLEOCIN   Used for:  Cellulitis and abscess of leg, except foot, MRSA infection   Started by:  Timothy Lindsey MD        Dose:  300 mg   Take 1 capsule (300 mg) by mouth 4 times daily   Quantity:  40 capsule   Refills:  0       mupirocin 2 % ointment   Commonly known as:  BACTROBAN   Used for:  MRSA infection   Started by:  Timothy Lindsey MD        Apply topically 3 times daily .prof   Quantity:  22 g   Refills:  1            Where to get your medicines      These medications were sent to [address] [address]     Phone:  [phone]     clindamycin 300 MG capsule    mupirocin 2 % ointment                Primary Care Provider Office Phone # Fax #    Timothy Lindsey [phone] [address] [facility] [address]        Equal Access to Services     DARIO LONG AH: Hadii ra ku hadasho Soomaali, waaxda luqadaha, qaybta kaalmada adeegyada, josefina moore. So [facility] [phone].    ATENCIÓN: Si habla español, tiene a cloud disposición servicios gratuitos de asistencia lingüística. Llame al [phone].    We comply with applicable federal civil rights laws and [place] laws. We do not discriminate on the basis of race, color, national origin, age, disability, sex, sexual orientation, or gender identity.            Thank you!     Thank you for choosing [facility]  for your care.  Our goal is always to provide you with excellent care. Hearing back from our patients is one way we can continue to improve our services. Please take a few minutes to complete the written survey that you may receive in the mail after your visit with us. Thank you!             Your Updated Medication List - Protect others around you: Learn how to safely use, store and throw away your medicines at www.disposemymeds.org.          This list is accurate as of 7/13/18  4:26 PM.  Always use your most recent med list.                   Brand Name Dispense Instructions for use Diagnosis    ACIDOPHILUS PROBIOTIC BLEND Caps     60 capsule    Take 3 capsules by mouth 2 times daily    Stomach pain       cholecalciferol 2000 units Caps    [employer] VITAMIN D    30 capsule    Take 1 capsule by mouth daily as needed    Morbid obesity (H)       clindamycin 300 MG capsule    CLEOCIN    40 capsule    Take 1 capsule (300 mg) by mouth 4 times daily    Cellulitis and abscess of leg, except foot, MRSA infection       DEPO-TESTOSTERONE IM      Inject  into the muscle.        desmopressin 0.01 % Soln spray    DDAVP    20 mL    Spray 1 spray (10 mcg) in nostril 4 times daily as needed    Panhypopituitarism (H), Diabetes insipidus (H)       EPINEPHrine 0.3 MG/0.3ML injection 2-pack    EPIPEN 2-SUZAN    0.6 mL    Inject 0.3 mLs (0.3 mg) into the muscle as needed for anaphylaxis    Hives, Facial swelling       ibuprofen 400 MG tablet    ADVIL/MOTRIN    60 tablet    Take 2 tablets (800 mg) by mouth every 8 hours as needed for moderate pain    Chronic midline low back pain without sciatica, Neck pain       ketoconazole 2 % cream    NIZORAL    15 g    Apply topically 2 times daily    Tinea cruris       levothyroxine 150 MCG tablet    SYNTHROID/LEVOTHROID     Take 1 tablet by mouth daily.        Lidocaine 4 % Patch    ASPERCREME LIDOCAINE    15 patch    Place 1 patch onto the skin every 24 hours    DDD (degenerative disc disease), lumbar        methocarbamol 500 MG tablet    ROBAXIN    60 tablet    Take 2 tablets (1,000 mg) by mouth 3 times daily as needed for muscle spasms    Lesion of right sciatic nerve       miconazole 2 % powder    LOTRIMIN AF    43 g    Apply topically as needed for itching or other    Tinea cruris       MULTI-VITAMIN PO      Take 1 tablet by mouth daily.        mupirocin 2 % ointment    BACTROBAN    22 g    Apply topically 3 times daily .prof    MRSA infection       polyethylene glycol powder    MIRALAX    510 g    Take 17 g (1 capful) by mouth daily    Abdominal pain, generalized       triamcinolone 0.1 % cream    KENALOG    30 g    Apply sparingly to affected area three times daily as needed    Dermatitis          Normal

## 2025-06-13 NOTE — ED ADULT NURSE NOTE - OBJECTIVE STATEMENT
29y male presented to the ED with complaints of panic attack. Pt states he had an episode where he got angry and had a lot of yelling. Pt Denies SI/HI. Pt took 1mg ativan PTA. Pt calm and co-operative.

## 2025-06-13 NOTE — ED BEHAVIORAL HEALTH ASSESSMENT NOTE - SUMMARY
This is a 29 year old single male, non-caregiver, domiciled with his father, with past psychiatric history of panic disorder, OCD, bipolar vs other affective disorder, ADHD and stimulant dependence (in remission; hx of induced psychosis), not currently in outpatient psychiatric care but prescribed Ativan 1 mg BID from his recent  admission, multiple past psychiatric admissions (last known at  from 5/21-5/28/2025), history of non-suicidal self injury (hitting self when anxious), no known history of suicide attempts or physical aggression, OOP legal hx (previously filed by pt's father iso pt throwing away father's belongings), and no pertinent past medical history otherwise who presents to the ED BIB self-activated EMS s/p panic attacks today endorsing ongoing and worsening anxiety since his last admission, requesting readmission for medication management. Psychiatry consulted for evaluation.     His psychiatric assessment is consistent with panic disorder with severe panic attacks and obsessive compulsive disorder that are functionally debilitating. He does not evidence any active suicidality, homicidality, antonia or psychosis and does not meet criteria for involuntary psychiatric hospitalization but presents with a symptom severity of anxiety & OCD spectrum illness and with medication management needs that could be benefitted by inpatient psychiatric admission. Review of his most recent hospital course also supports the notion of ongoing inpatient care needs at time the time of his discharge as patient requested discharge (signed a 72-hr letter) while continuing to experience symptoms, had limited engagement with/benefit from medication management up to that point, but did not meet criteria for involuntary hospitalization at that time. He is currently only on as needed benzodiazepine medication, could benefit from high dose antidepressant medication to better target daily OCD and panic disorder symptoms, is additionally at risk of benzodiazepine dependence with his current regimen/use pattern and expressed dissatisfaction with outpatient care scheduled on his behalf during his last admission; all limiting the likelihood of effective outpatient treatment planning at this time. As such, voluntary inpatient admission is recommended.

## 2025-06-13 NOTE — ED BEHAVIORAL HEALTH ASSESSMENT NOTE - DESCRIPTION
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 Took prescribed PO Ativan. Denies SI/HI/ AH/VH. Pt A&ox4, ambulatory, no s/s of distress. Pt reports he has frequent panic attacks and cannot function at home. Declined appt with [facility] as he did not like them, was unable to get private outpatient psych appt. No medical complaints

## 2025-06-13 NOTE — ED ADULT NURSE NOTE - NS ED NURSE LEVEL OF CONSCIOUSNESS ORIENTATION
Astigmatism-Discussed diagnosis with patient. Updated spec Rx given. Recommend lens that will provide comfort as well as protect safety and health of eyes. Oriented - self; Oriented - place; Oriented - time

## 2025-06-13 NOTE — ED STATDOCS - PROGRESS NOTE DETAILS
Adilene Ignacio for attending Dr. Webster  Pt having anxiety panic attack. Pt was recently discharged from  and believes he needs to be readmitted. Will send to the main for psych evaluation.

## 2025-06-13 NOTE — ED BEHAVIORAL HEALTH ASSESSMENT NOTE - PSYCHIATRIC ISSUES AND PLAN (INCLUDE STANDING AND PRN MEDICATION)
Consider Lexapro w/ dose escalation to maximum dose to better target anxiety & OCD sx during IPP course. Resume Ativan 1 mg PO BID PRN severe anxiety (recommend identifying alternative anxiolytics during hospital course in light of pt's substance dependence hx & report of excess dosing on some days).

## 2025-06-19 ENCOUNTER — EMERGENCY (EMERGENCY)
Facility: HOSPITAL | Age: 30
LOS: 1 days | End: 2025-06-19
Attending: STUDENT IN AN ORGANIZED HEALTH CARE EDUCATION/TRAINING PROGRAM
Payer: MEDICAID

## 2025-06-19 VITALS
HEART RATE: 88 BPM | SYSTOLIC BLOOD PRESSURE: 125 MMHG | TEMPERATURE: 98 F | DIASTOLIC BLOOD PRESSURE: 96 MMHG | RESPIRATION RATE: 17 BRPM | HEIGHT: 68 IN | WEIGHT: 132.5 LBS | OXYGEN SATURATION: 100 %

## 2025-06-19 DIAGNOSIS — F41.0 PANIC DISORDER [EPISODIC PAROXYSMAL ANXIETY]: ICD-10-CM

## 2025-06-19 DIAGNOSIS — F41.9 ANXIETY DISORDER, UNSPECIFIED: ICD-10-CM

## 2025-06-19 DIAGNOSIS — F42.9 OBSESSIVE-COMPULSIVE DISORDER, UNSPECIFIED: ICD-10-CM

## 2025-06-19 DIAGNOSIS — F32.A DEPRESSION, UNSPECIFIED: ICD-10-CM

## 2025-06-19 DIAGNOSIS — F90.9 ATTENTION-DEFICIT HYPERACTIVITY DISORDER, UNSPECIFIED TYPE: ICD-10-CM

## 2025-06-19 LAB
ALBUMIN SERPL ELPH-MCNC: 4 G/DL — SIGNIFICANT CHANGE UP (ref 3.3–5)
ALP SERPL-CCNC: 48 U/L — SIGNIFICANT CHANGE UP (ref 40–120)
ALT FLD-CCNC: 28 U/L — SIGNIFICANT CHANGE UP (ref 12–78)
AMPHET UR-MCNC: POSITIVE — SIGNIFICANT CHANGE UP
ANION GAP SERPL CALC-SCNC: 5 MMOL/L — SIGNIFICANT CHANGE UP (ref 5–17)
APAP SERPL-MCNC: < 2 UG/ML (ref 10–30)
APPEARANCE UR: CLEAR — SIGNIFICANT CHANGE UP
AST SERPL-CCNC: 12 U/L — LOW (ref 15–37)
BARBITURATES UR SCN-MCNC: NEGATIVE — SIGNIFICANT CHANGE UP
BASOPHILS # BLD AUTO: 0.05 K/UL — SIGNIFICANT CHANGE UP (ref 0–0.2)
BASOPHILS NFR BLD AUTO: 0.8 % — SIGNIFICANT CHANGE UP (ref 0–2)
BENZODIAZ UR-MCNC: NEGATIVE — SIGNIFICANT CHANGE UP
BILIRUB SERPL-MCNC: 0.4 MG/DL — SIGNIFICANT CHANGE UP (ref 0.2–1.2)
BILIRUB UR-MCNC: NEGATIVE — SIGNIFICANT CHANGE UP
BUN SERPL-MCNC: 24 MG/DL — HIGH (ref 7–23)
CALCIUM SERPL-MCNC: 9.3 MG/DL — SIGNIFICANT CHANGE UP (ref 8.5–10.1)
CHLORIDE SERPL-SCNC: 106 MMOL/L — SIGNIFICANT CHANGE UP (ref 96–108)
CO2 SERPL-SCNC: 25 MMOL/L — SIGNIFICANT CHANGE UP (ref 22–31)
COCAINE METAB.OTHER UR-MCNC: NEGATIVE — SIGNIFICANT CHANGE UP
COLOR SPEC: YELLOW — SIGNIFICANT CHANGE UP
CREAT SERPL-MCNC: 1.04 MG/DL — SIGNIFICANT CHANGE UP (ref 0.5–1.3)
DIFF PNL FLD: NEGATIVE — SIGNIFICANT CHANGE UP
EGFR: 99 ML/MIN/1.73M2 — SIGNIFICANT CHANGE UP
EGFR: 99 ML/MIN/1.73M2 — SIGNIFICANT CHANGE UP
EOSINOPHIL # BLD AUTO: 0.02 K/UL — SIGNIFICANT CHANGE UP (ref 0–0.5)
EOSINOPHIL NFR BLD AUTO: 0.3 % — SIGNIFICANT CHANGE UP (ref 0–6)
ETHANOL SERPL-MCNC: <10 MG/DL — SIGNIFICANT CHANGE UP (ref 0–10)
FENTANYL UR QL SCN: NEGATIVE — SIGNIFICANT CHANGE UP
FLUAV AG NPH QL: SIGNIFICANT CHANGE UP
FLUBV AG NPH QL: SIGNIFICANT CHANGE UP
GLUCOSE SERPL-MCNC: 84 MG/DL — SIGNIFICANT CHANGE UP (ref 70–99)
GLUCOSE UR QL: NEGATIVE MG/DL — SIGNIFICANT CHANGE UP
HCT VFR BLD CALC: 44.6 % — SIGNIFICANT CHANGE UP (ref 39–50)
HGB BLD-MCNC: 14.8 G/DL — SIGNIFICANT CHANGE UP (ref 13–17)
IMM GRANULOCYTES # BLD AUTO: 0.01 K/UL — SIGNIFICANT CHANGE UP (ref 0–0.07)
IMM GRANULOCYTES NFR BLD AUTO: 0.2 % — SIGNIFICANT CHANGE UP (ref 0–0.9)
KETONES UR QL: NEGATIVE MG/DL — SIGNIFICANT CHANGE UP
LEUKOCYTE ESTERASE UR-ACNC: NEGATIVE — SIGNIFICANT CHANGE UP
LYMPHOCYTES # BLD AUTO: 1.29 K/UL — SIGNIFICANT CHANGE UP (ref 1–3.3)
LYMPHOCYTES NFR BLD AUTO: 20.5 % — SIGNIFICANT CHANGE UP (ref 13–44)
MCHC RBC-ENTMCNC: 29.7 PG — SIGNIFICANT CHANGE UP (ref 27–34)
MCHC RBC-ENTMCNC: 33.2 G/DL — SIGNIFICANT CHANGE UP (ref 32–36)
MCV RBC AUTO: 89.6 FL — SIGNIFICANT CHANGE UP (ref 80–100)
METHADONE UR-MCNC: NEGATIVE — SIGNIFICANT CHANGE UP
MONOCYTES # BLD AUTO: 0.3 K/UL — SIGNIFICANT CHANGE UP (ref 0–0.9)
MONOCYTES NFR BLD AUTO: 4.8 % — SIGNIFICANT CHANGE UP (ref 2–14)
NEUTROPHILS # BLD AUTO: 4.63 K/UL — SIGNIFICANT CHANGE UP (ref 1.8–7.4)
NEUTROPHILS NFR BLD AUTO: 73.4 % — SIGNIFICANT CHANGE UP (ref 43–77)
NITRITE UR-MCNC: NEGATIVE — SIGNIFICANT CHANGE UP
NRBC # BLD AUTO: 0 K/UL — SIGNIFICANT CHANGE UP (ref 0–0)
NRBC # FLD: 0 K/UL — SIGNIFICANT CHANGE UP (ref 0–0)
NRBC BLD AUTO-RTO: 0 /100 WBCS — SIGNIFICANT CHANGE UP (ref 0–0)
OPIATES UR-MCNC: NEGATIVE — SIGNIFICANT CHANGE UP
PCP SPEC-MCNC: SIGNIFICANT CHANGE UP
PCP UR-MCNC: NEGATIVE — SIGNIFICANT CHANGE UP
PH UR: 6 — SIGNIFICANT CHANGE UP (ref 5–8)
PLATELET # BLD AUTO: 254 K/UL — SIGNIFICANT CHANGE UP (ref 150–400)
PMV BLD: 9.5 FL — SIGNIFICANT CHANGE UP (ref 7–13)
POTASSIUM SERPL-MCNC: 3.7 MMOL/L — SIGNIFICANT CHANGE UP (ref 3.5–5.3)
POTASSIUM SERPL-SCNC: 3.7 MMOL/L — SIGNIFICANT CHANGE UP (ref 3.5–5.3)
PROT SERPL-MCNC: 7.1 GM/DL — SIGNIFICANT CHANGE UP (ref 6–8.3)
PROT UR-MCNC: NEGATIVE MG/DL — SIGNIFICANT CHANGE UP
RBC # BLD: 4.98 M/UL — SIGNIFICANT CHANGE UP (ref 4.2–5.8)
RBC # FLD: 12.2 % — SIGNIFICANT CHANGE UP (ref 10.3–14.5)
RSV RNA NPH QL NAA+NON-PROBE: SIGNIFICANT CHANGE UP
SALICYLATES SERPL-MCNC: <1.7 MG/DL — LOW (ref 2.8–20)
SARS-COV-2 RNA SPEC QL NAA+PROBE: SIGNIFICANT CHANGE UP
SODIUM SERPL-SCNC: 136 MMOL/L — SIGNIFICANT CHANGE UP (ref 135–145)
SOURCE RESPIRATORY: SIGNIFICANT CHANGE UP
SP GR SPEC: 1.02 — SIGNIFICANT CHANGE UP (ref 1–1.03)
THC UR QL: NEGATIVE — SIGNIFICANT CHANGE UP
TSH SERPL-MCNC: 1.35 UU/ML — SIGNIFICANT CHANGE UP (ref 0.34–4.82)
UROBILINOGEN FLD QL: 0.2 MG/DL — SIGNIFICANT CHANGE UP (ref 0.2–1)
WBC # BLD: 6.3 K/UL — SIGNIFICANT CHANGE UP (ref 3.8–10.5)
WBC # FLD AUTO: 6.3 K/UL — SIGNIFICANT CHANGE UP (ref 3.8–10.5)

## 2025-06-19 PROCEDURE — 80307 DRUG TEST PRSMV CHEM ANLYZR: CPT

## 2025-06-19 PROCEDURE — 99284 EMERGENCY DEPT VISIT MOD MDM: CPT | Mod: 25

## 2025-06-19 PROCEDURE — 93010 ELECTROCARDIOGRAM REPORT: CPT

## 2025-06-19 PROCEDURE — 36415 COLL VENOUS BLD VENIPUNCTURE: CPT

## 2025-06-19 PROCEDURE — 93005 ELECTROCARDIOGRAM TRACING: CPT

## 2025-06-19 PROCEDURE — 99285 EMERGENCY DEPT VISIT HI MDM: CPT

## 2025-06-19 PROCEDURE — 81003 URINALYSIS AUTO W/O SCOPE: CPT

## 2025-06-19 PROCEDURE — 90792 PSYCH DIAG EVAL W/MED SRVCS: CPT | Mod: 95

## 2025-06-19 PROCEDURE — 0241U: CPT

## 2025-06-19 PROCEDURE — 80053 COMPREHEN METABOLIC PANEL: CPT

## 2025-06-19 PROCEDURE — 85025 COMPLETE CBC W/AUTO DIFF WBC: CPT

## 2025-06-19 PROCEDURE — 84443 ASSAY THYROID STIM HORMONE: CPT

## 2025-06-19 RX ORDER — LORAZEPAM 4 MG/ML
1 VIAL (ML) INJECTION ONCE
Refills: 0 | Status: DISCONTINUED | OUTPATIENT
Start: 2025-06-19 | End: 2025-06-19

## 2025-06-19 RX ORDER — CLONAZEPAM 0.5 MG/1
1 TABLET ORAL DAILY
Refills: 0 | Status: COMPLETED | OUTPATIENT
Start: 2025-06-20 | End: 2025-06-27

## 2025-06-19 RX ORDER — CLONAZEPAM 0.5 MG/1
1 TABLET ORAL AT BEDTIME
Refills: 0 | Status: COMPLETED | OUTPATIENT
Start: 2025-06-19 | End: 2025-06-26

## 2025-06-19 RX ORDER — FLUOXETINE HYDROCHLORIDE 20 MG/1
20 CAPSULE ORAL DAILY
Refills: 0 | Status: DISCONTINUED | OUTPATIENT
Start: 2025-06-20 | End: 2025-06-23

## 2025-06-19 RX ADMIN — Medication 1 MILLIGRAM(S): at 15:30

## 2025-06-19 RX ADMIN — CLONAZEPAM 1 MILLIGRAM(S): 0.5 TABLET ORAL at 21:40

## 2025-06-19 NOTE — ED BEHAVIORAL HEALTH ASSESSMENT NOTE - DETAILS
as above Deferred after hours and detailed documentation from recent admissions available  5/21-5/28/2025 then [facility] 6/14-6/17/2025 self referred as per HPI Pt reports a dystonic reaction w/ Vraylar & dependence w/ Adderall

## 2025-06-19 NOTE — ED BEHAVIORAL HEALTH ASSESSMENT NOTE - LEGAL HISTORY
Per chart: Pt's father had limited OOP against pt due to him throwing out his father's belongings to restrict pt from discarding his belongings in the future

## 2025-06-19 NOTE — ED PROVIDER NOTE - PROGRESS NOTE DETAILS
Spoke with psychiatry attending, recommends giving patient Klonopin tonight and tomorrow morning as well as his Prozac.  Orders placed as recommended.  Psychiatry recommended patient be hold till the morning for in person reassessment.  Patient to be signed out to Dr. Becerra for overnight hold. pt reevalauted by psych and states pt cleared for d/c home with f/u

## 2025-06-19 NOTE — ED PROVIDER NOTE - PHYSICAL EXAMINATION
GENERAL: A&Ox4, non-toxic appearing, no acute distress  HEENT: NCAT, EOMI, oral mucosa moist, normal conjunctiva  RESP: no respiratory distress, CTAB, no wheezes/rhonchi/rales, speaking in full sentences  CV: RRR, no murmurs/rubs/gallops  ABDOMEN: soft, non-tender, non-distended, no guarding, no rebound tenderness  MSK: no visible deformities  NEURO: no focal sensory or motor deficits, CN 2-12 grossly intact  SKIN: warm, normal color, well perfused, no rash  PSYCH: Anxious appearing, pressured speech, no SI/HI

## 2025-06-19 NOTE — ED BEHAVIORAL HEALTH ASSESSMENT NOTE - DESCRIPTION
As above. Pt also reported to the ED provider taking a dose of Adderall not currently prescribed to him. 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

## 2025-06-19 NOTE — ED BEHAVIORAL HEALTH ASSESSMENT NOTE - OTHER
mostly fair w/ intermittent intense/wide-eyed contact intermittent anxious inattentiveness on observation Records checked/sent to [facility] – with data:  [facility], [facility], [facility], [place].

## 2025-06-19 NOTE — ED PROVIDER NOTE - NSFOLLOWUPINSTRUCTIONS_ED_ALL_ED_FT
please follow up with your doctors as directed.   continue with your medications as prescribed.   drink plenty of fluids.   return to ED for any concerns

## 2025-06-19 NOTE — ED BEHAVIORAL HEALTH ASSESSMENT NOTE - PAST PSYCHOTROPIC MEDICATION
Ativan, Prozac (x1 year in the past), Lexapro (x 1 month following 2/2025  IPP), Adderall x8 years (dependence), Buspar & Vraylar

## 2025-06-19 NOTE — ED BEHAVIORAL HEALTH ASSESSMENT NOTE - PSYCHIATRIC ISSUES AND PLAN (INCLUDE STANDING AND PRN MEDICATION)
Recommend resuming Prozac 20 mg daily (next dose tomorrow morning) and conversion from Ativan (previously taking 1 mg BID) to Klonopin for short term use as such; Klonopin 1 mg PO HS x1 tonight at 10pm & Klonopin 0.5 mg PO daily x1 tomorrow morning at 9. Pt should be reassessed thereafter for outpatient medication management (has [facility] appt at 2pm tomorrow) vs voluntary readmission at  IPP.

## 2025-06-19 NOTE — ED ADULT TRIAGE NOTE - CHIEF COMPLAINT QUOTE
pt brought it by EMS from home c/o anxiety. reports he had one 4 hours prior to arrival that last 30 minutes then had another one beginning 1 hour prior to arrival. reports he has been unable to get his ativan prescription from pharmacy which usually helps. pt denies SI/HI/hallucinations.

## 2025-06-19 NOTE — ED PROVIDER NOTE - OBJECTIVE STATEMENT
29-year-old male PMH anxiety, depression, ADHD, recently admitted to [facility] and prescribed Prozac (has not yet picked up prescription) presents the emergency department for panic attack.  Patient states he has racing thoughts, feels like he cannot calm down.  Was previously on Ativan, has not taken it today.  Patient requesting psychiatric admission.  He denies SI/HI, no AVH.  He states he took a Adderall that is not prescribed to him, otherwise denies recreational drug or alcohol use.

## 2025-06-19 NOTE — ED BEHAVIORAL HEALTH ASSESSMENT NOTE - NSBHMSETHTCONTENT_PSY_A_CORE
anxious preoccupations on panic symptoms, recent admissions and medication management/Obsessions/Preoccupations/Ruminations

## 2025-06-19 NOTE — ED BEHAVIORAL HEALTH ASSESSMENT NOTE - REASON
recent recurrent ED visits and psychiatric admissions; active medication management needs; no  beds available at this time & patient does not meet criteria for admission elsewhere.

## 2025-06-19 NOTE — ED ADULT NURSE NOTE - OBJECTIVE STATEMENT
pt bibems c/o panic attack. Patient states he has racing thoughts, feels like he cannot calm down. Patient is requesting psychiatric admission. denies SI/HI. pt states he took a Adderall that is not prescribed to him, otherwise denies recreational drug or alcohol use. pmh anxiety, depression, ADHD, recently admitted to [facility] and prescribed Prozac (has not yet picked up prescription)

## 2025-06-19 NOTE — ED PROVIDER NOTE - PATIENT PORTAL LINK FT
You can access the FollowMyHealth Patient Portal offered by [facility] by registering at the following website: [email or web address]. By joining [employer]’s FollowMyHealth portal, you will also be able to view your health information using other applications (apps) compatible with our system.

## 2025-06-19 NOTE — ED BEHAVIORAL HEALTH ASSESSMENT NOTE - HPI (INCLUDE ILLNESS QUALITY, SEVERITY, DURATION, TIMING, CONTEXT, MODIFYING FACTORS, ASSOCIATED SIGNS AND SYMPTOMS)
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), non-caregiver, domiciled with his father, with past psychiatric history of panic disorder, OCD, bipolar vs other affective disorder, ADHD and stimulant dependence (in remission; previously prescribed Adderall w/ hx of induced psychosis), not currently in outpatient psychiatric care but prescribed Ativan 1 mg BID from his recent  admission then Prozac 20 mg form [facility] admission after, awaiting psychiatric appointment at [facility] 9/20 at 2pm, multiple past psychiatric admissions (last known at [facility] 6/14-6/17 & before that at  from 5/21-5/28/2025; signed 72 hour later both IPPs), history of non-suicidal self injury (hitting self when anxious), no known history of suicide attempts or physical aggression, limited OOP legal hx (previously filed by pt's father iso pt throwing away father's belongings), and no pertinent past medical history otherwise who presents to the ED BIB self-activated EMS s/p panic attacks today endorsing ongoing and worsening anxiety since his last 2 admissions, requesting readmission for medication management. Psychiatry consulted for evaluation.     Patient is known to this writer from 6/13 ED BH evaluation prior to his [facility] admission. On assessment, patient states "hello again" then recaps the events of his recent admission, conveying that [facility] IPP "was not good for me," that "they stopped the Ativan just because of something that I said" elaborating on taking more than 2 doses a day on days when having more panic attacks. He also notes "I was not comfortable there," then their plan was to stop the Ativan "abruptly" rather than gradually and "I did withdraw." He also notes "I requested discharge from [facility]" and "I do not want to go back there," conveying that they still prescribed him Ativan up until his day of discharge but then instructed him to dispose of the Ativan at home which he did. He reports they did prescribe Prozac which was good but "I haven't had the wherewithal to go an pick it up from the pharmacy" since leaving [facility] on Tuesday because he hasn't been "well enough" to leave his home due to anxiety. He explains further that in agreeing to take the Prozac, he had desired to be tapered off the Ativan gradually after the Prozac became effective rather than it being stopped. He reports ongoing anxiety upon his return home, taking a dose of Ativan from his previous supply yesterday, but subsequently flushing the remaining tablets away in an attempt to heed the instructions form his admission. He conveys that he then woke up today to recurrent symptoms of panic, feeling worse that usual because he had no Ativan to alleviate the symptoms and ultimately presenting to the ED for management. He conveys his hopes in returning is for readmission to [facility]'s psychiatric unit, perseverating on sentiments of trusting his care at [facility], feeling his initial treatment from his May admission was incomplete and the care at [facility] being inadequate in comparison. He also conveys having an appointment with a psychiatric provider tomorrow at 2pm at [facility], arranged during his SOH, but feeling he should cancel it in exchange for care at [facility] instead. On ROS, he conveys his symptoms are unchanged from previous, stating it's "the same anxiety" which he feels "snowballed today without the Ativan." He describes feeling distressed when having panic attacks, conveying "I woke up this morning and I just could not function." He denies any death wishes, SI or HI, AVH, paranoia, conveying "I'm able to manage myself before that extreme" yet reiterating distress when he is in panic. He reports that in the past he has experienced suicidal thinking when acutely anxious, but never to the extent of planning or intent and also "not recently." He engages in detailed treatment discussion subsequently, agreeable to plan for ED hold, trial of Klonopin as a short term anxiolytic while resuming Prozac. He is also educated robustly on limitations of inpatient management for his symptoms given limited efficacy and premature discharge at his request after 2 recent admissions.

## 2025-06-19 NOTE — ED PROVIDER NOTE - CLINICAL SUMMARY MEDICAL DECISION MAKING FREE TEXT BOX
29-year-old male presenting with acute panic attack, recently admitted for similar to [facility] but has not started Prozac yet.  Patient appears anxious, pressured speech.  Will obtain medical screening, give Ativan for symptomatic treatment, psych consult.

## 2025-06-19 NOTE — ED BEHAVIORAL HEALTH ASSESSMENT NOTE - SUMMARY
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), non-caregiver, domiciled with his father, with past psychiatric history of panic disorder, OCD, bipolar vs other affective disorder, ADHD and stimulant dependence (in remission; previously prescribed Adderall w/ hx of induced psychosis), not currently in outpatient psychiatric care but prescribed Ativan 1 mg BID from his recent  admission then Prozac 20 mg form [facility] admission after, awaiting psychiatric appointment at [facility] 9/20 at 2pm, multiple past psychiatric admissions (last known at [facility] 6/14-6/17 & before that at  from 5/21-5/28/2025; signed 72 hour later both IPPs), history of non-suicidal self injury (hitting self when anxious), no known history of suicide attempts or physical aggression, limited OOP legal hx (previously filed by pt's father iso pt throwing away father's belongings), and no pertinent past medical history otherwise who presents to the ED BIB self-activated EMS s/p panic attacks today endorsing ongoing and worsening anxiety since his last 2 admissions, requesting readmission for medication management. Psychiatry consulted for evaluation.     Patient is known to this writer from 6/13 ED BH evaluation prior to his [facility] admission and presents as less labile affectively but otherwise clinically unchanged from previous. His psychiatric assessment remains consistent with panic disorder with severe panic attacks and obsessive compulsive disorder that are functionally debilitating. He does not evidence any active suicidality, homicidality, antonia or psychosis and does not meet criteria for involuntary psychiatric hospitalization but presents with a symptom severity of anxiety & OCD spectrum illness, and with medication management needs that could be benefitted by inpatient psychiatric admission. However, review of his most recent hospital courses both supports the notion of ongoing inpatient care needs at time the time of his discharges and the notion that voluntary inpatient admissions are not sufficient  to stabilize his symptoms; as patient requested discharge & signed a 72-hr letters on both occasions while continuing to experience symptoms. He has also failed on both occasions to comply with aftercare recommendations and now presents requesting readmission when already scheduled for an outpatient appointment tomorrow. It is likely more clinically appropriate to redirect patient towards establishing outpatient care for longitudinal management of his symptoms, but his prognosis is guarded if discharged at this juncture in light of uncertainty about his medications and his frequent ED visits. Long term anxiolysis with an SSRI is recommended so would suggest resuming Prozac which patient failed to refill after SOH discharge 2 days ago; with plan to titrate to higher doses on an outpatient basis to better target anxiety and OCD spectrum symptoms. He likely would also benefit from short term benzodiazepine anxiolysis as he awaits SSRI efficacy, but has a risk of dependency particularly with short acting benzodiazepines and his recent use pattern (some days taking more than the prescribed 2 doses a day). As such, would suggest short term course of a longer acting benzodiazepine and consider supervised administration or limited duration fills (i.e q1-2 weeks rather than 30 day supply). Patient can be tried on medication overnight and in the morning while in the ED, then voluntary admission can be considered for further medication management if clinically appropriate upon reassessment by in person  psychiatric providers tomorrow. Otherwise, patient can be transitioned seamlessly to his outpatient care appointment directly from the ED tomorrow afternoon, and if so, would recommend handing off to accepting outpatient provider to facilitate continuity of care.

## 2025-06-20 VITALS
DIASTOLIC BLOOD PRESSURE: 70 MMHG | TEMPERATURE: 98 F | HEART RATE: 74 BPM | OXYGEN SATURATION: 100 % | RESPIRATION RATE: 17 BRPM | SYSTOLIC BLOOD PRESSURE: 129 MMHG

## 2025-06-20 PROCEDURE — 99283 EMERGENCY DEPT VISIT LOW MDM: CPT

## 2025-06-20 RX ADMIN — CLONAZEPAM 1 MILLIGRAM(S): 0.5 TABLET ORAL at 07:47

## 2025-06-20 RX ADMIN — FLUOXETINE HYDROCHLORIDE 20 MILLIGRAM(S): 20 CAPSULE ORAL at 07:48

## 2025-06-20 NOTE — ED BEHAVIORAL HEALTH PROGRESS NOTE - NSBHMSEKNOW_PSY_A_CORE
72F Hx HTN, T2DM, CVAs x 2 (2017 and 2021), HFrEF 59%, BPH p/w ED Generalized Weakness, SOB and WOB started on AFMO2 escalated to BiPAP Support with CXR c/w Lt Retrocardiac Infiltrates R/O Aspiration.   Assessment  DMT2: 72y Male with DM T2 with hyperglycemia, A1C 9.1% (2022) rpt pending, was on oral meds and [place] home, now on basal bolus insulin with coverage, blood sugars running high.  Shortness of breath: on medications, BIPAP support, monitored.  HTN: on antihypertensive medications, monitored, asymptomatic.  BEAR/CKD: Monitor labs/BMP, renal fu.       Discussed plan and management wit Dr Clarisse Robb MD  Cell: [phone]  Office: [phone]             Normal

## 2025-06-20 NOTE — ED BEHAVIORAL HEALTH PROGRESS NOTE - RISK ASSESSMENT
Patient is not at imminent risk to harm self and he is not grossly psychotic not depressed.  His chief complaint is "can you give me Ativan".  He is benzo seeking.  He is acute risk is low.  Patient is very depressed nor psychotic or manic.  He experiences anxiety as his baseline.  He refuses SSRIs any other medication but wants only benzos.  pt has edith with [facility] appt at 2pm today.   Patient does not Tania for psychiatry admission and he should follow-up with his outpatient provider and keep his appointment today.  This is valeriano the patient's.  Patient denies any thoughts of harming self or anybody else, he denies any voices or paranoid thinking.  Patient knows how to return to ED and he knows how to seek help.  He takes walks into his father is his coping skills.  Patient is safe to be discharged from our emergency room and advised to follow-up with [facility] as advised on discharge is out of hospital.

## 2025-06-20 NOTE — ED ADULT NURSE REASSESSMENT NOTE - NS ED NURSE REASSESS COMMENT FT1
Received report from previous RN Alaina. Pt lying in stretcher with unlabored respirations and denies any pain at this time. 1:1 maintained for safety.

## 2025-06-20 NOTE — ED BEHAVIORAL HEALTH PROGRESS NOTE - NS ED BHA PLAN TR BH CONTACTED FT
Patient makes visits to [facility] emergency room and had few hospitalizations on 5 N.  There was no need to contact [facility] as our team knows him well.

## 2025-06-20 NOTE — ED BEHAVIORAL HEALTH PROGRESS NOTE - OTHER
intermittent anxious inattentiveness on observation mostly fair w/ intermittent intense/wide-eyed contact

## 2025-06-20 NOTE — ED BEHAVIORAL HEALTH PROGRESS NOTE - SUMMARY
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), non-caregiver, domiciled with his father, with past psychiatric history of panic disorder, OCD, bipolar vs other affective disorder, ADHD and stimulant dependence (in remission; previously prescribed Adderall w/ hx of induced psychosis), not currently in outpatient psychiatric care but prescribed Ativan 1 mg BID from his recent  admission then Prozac 20 mg form [facility] admission after, awaiting psychiatric appointment at [facility] 9/20 at 2pm, multiple past psychiatric admissions (last known at [facility] 6/14-6/17 & before that at  from 5/21-5/28/2025; signed 72 hour later both IPPs), history of non-suicidal self injury (hitting self when anxious), no known history of suicide attempts or physical aggression, limited OOP legal hx (previously filed by pt's father iso pt throwing away father's belongings), and no pertinent past medical history otherwise who presents to the ED BIB self-activated EMS s/p panic attacks today endorsing ongoing and worsening anxiety since his last 2 admissions, requesting readmission for medication management. Psychiatry consulted for evaluation.     Patient is known to this writer from 6/13 ED BH evaluation prior to his [facility] admission and presents as less labile affectively but otherwise clinically unchanged from previous. His psychiatric assessment remains consistent with panic disorder with severe panic attacks and obsessive compulsive disorder that are functionally debilitating. He does not evidence any active suicidality, homicidality, antonia or psychosis and does not meet criteria for involuntary psychiatric hospitalization but presents with a symptom severity of anxiety & OCD spectrum illness, and with medication management needs that could be benefitted by inpatient psychiatric admission. However, review of his most recent hospital courses both supports the notion of ongoing inpatient care needs at time the time of his discharges and the notion that voluntary inpatient admissions are not sufficient  to stabilize his symptoms; as patient requested discharge & signed a 72-hr letters on both occasions while continuing to experience symptoms. He has also failed on both occasions to comply with aftercare recommendations and now presents requesting readmission when already scheduled for an outpatient appointment tomorrow. It is likely more clinically appropriate to redirect patient towards establishing outpatient care for longitudinal management of his symptoms, but his prognosis is guarded if discharged at this juncture in light of uncertainty about his medica. tions and his frequent ED visits. Long term anxiolysis with an SSRI is recommended so would suggest resuming Prozac which patient failed to refill after [facility] discharge 2 days ago; with plan to titrate to higher doses on an outpatient basis to better target anxiety and OCD spectrum symptoms. He likely would also benefit from short term benzodiazepine anxiolysis as he awaits SSRI efficacy, but has a risk of dependency particularly with short acting benzodiazepines and his recent use pattern (some days taking more than the prescribed 2 doses a day). As such, would suggest short term course of a longer acting benzodiazepine and consider supervised administration or limited duration fills (i.e q1-2 weeks rather than 30 day supply). Patient can be tried on medication overnight and in the morning while in the ED, then voluntary admission can be considered for further medication management if clinically appropriate upon reassessment by in person  psychiatric providers tomorrow. Otherwise, patient can be transitioned seamlessly to his outpatient care appointment directly from the ED tomorrow afternoon, and if so, would recommend handing off to accepting outpatient provider to facilitate continuity of care.

## 2025-06-20 NOTE — ED BEHAVIORAL HEALTH PROGRESS NOTE - DETAILS
pt Patient knows how to return to ED or call 9 1.  He talked to his father is his main support and take walks.

## 2025-06-26 ENCOUNTER — EMERGENCY (EMERGENCY)
Facility: HOSPITAL | Age: 30
LOS: 1 days | End: 2025-06-26
Attending: STUDENT IN AN ORGANIZED HEALTH CARE EDUCATION/TRAINING PROGRAM
Payer: MEDICAID

## 2025-06-26 VITALS
DIASTOLIC BLOOD PRESSURE: 83 MMHG | HEART RATE: 65 BPM | OXYGEN SATURATION: 100 % | SYSTOLIC BLOOD PRESSURE: 120 MMHG | RESPIRATION RATE: 17 BRPM | TEMPERATURE: 98 F

## 2025-06-26 VITALS — HEIGHT: 68 IN

## 2025-06-26 DIAGNOSIS — F41.9 ANXIETY DISORDER, UNSPECIFIED: ICD-10-CM

## 2025-06-26 DIAGNOSIS — F90.9 ATTENTION-DEFICIT HYPERACTIVITY DISORDER, UNSPECIFIED TYPE: ICD-10-CM

## 2025-06-26 DIAGNOSIS — F41.0 PANIC DISORDER [EPISODIC PAROXYSMAL ANXIETY]: ICD-10-CM

## 2025-06-26 DIAGNOSIS — F42.9 OBSESSIVE-COMPULSIVE DISORDER, UNSPECIFIED: ICD-10-CM

## 2025-06-26 DIAGNOSIS — F32.A DEPRESSION, UNSPECIFIED: ICD-10-CM

## 2025-06-26 LAB
ALBUMIN SERPL ELPH-MCNC: 4 G/DL — SIGNIFICANT CHANGE UP (ref 3.3–5)
ALP SERPL-CCNC: 46 U/L — SIGNIFICANT CHANGE UP (ref 40–120)
ALT FLD-CCNC: 33 U/L — SIGNIFICANT CHANGE UP (ref 12–78)
AMPHET UR-MCNC: NEGATIVE — SIGNIFICANT CHANGE UP
ANION GAP SERPL CALC-SCNC: 4 MMOL/L — LOW (ref 5–17)
APAP SERPL-MCNC: <2 UG/ML — LOW (ref 10–30)
APPEARANCE UR: CLEAR — SIGNIFICANT CHANGE UP
AST SERPL-CCNC: 14 U/L — LOW (ref 15–37)
BARBITURATES UR SCN-MCNC: NEGATIVE — SIGNIFICANT CHANGE UP
BASOPHILS # BLD AUTO: 0.03 K/UL — SIGNIFICANT CHANGE UP (ref 0–0.2)
BASOPHILS NFR BLD AUTO: 0.5 % — SIGNIFICANT CHANGE UP (ref 0–2)
BENZODIAZ UR-MCNC: NEGATIVE — SIGNIFICANT CHANGE UP
BILIRUB SERPL-MCNC: 0.5 MG/DL — SIGNIFICANT CHANGE UP (ref 0.2–1.2)
BILIRUB UR-MCNC: NEGATIVE — SIGNIFICANT CHANGE UP
BUN SERPL-MCNC: 24 MG/DL — HIGH (ref 7–23)
CALCIUM SERPL-MCNC: 9.3 MG/DL — SIGNIFICANT CHANGE UP (ref 8.5–10.1)
CHLORIDE SERPL-SCNC: 109 MMOL/L — HIGH (ref 96–108)
CO2 SERPL-SCNC: 25 MMOL/L — SIGNIFICANT CHANGE UP (ref 22–31)
COCAINE METAB.OTHER UR-MCNC: NEGATIVE — SIGNIFICANT CHANGE UP
COLOR SPEC: YELLOW — SIGNIFICANT CHANGE UP
CREAT SERPL-MCNC: 1.1 MG/DL — SIGNIFICANT CHANGE UP (ref 0.5–1.3)
DIFF PNL FLD: NEGATIVE — SIGNIFICANT CHANGE UP
EGFR: 93 ML/MIN/1.73M2 — SIGNIFICANT CHANGE UP
EGFR: 93 ML/MIN/1.73M2 — SIGNIFICANT CHANGE UP
EOSINOPHIL # BLD AUTO: 0.01 K/UL — SIGNIFICANT CHANGE UP (ref 0–0.5)
EOSINOPHIL NFR BLD AUTO: 0.2 % — SIGNIFICANT CHANGE UP (ref 0–6)
ETHANOL SERPL-MCNC: <10 MG/DL — SIGNIFICANT CHANGE UP (ref 0–10)
FENTANYL UR QL SCN: NEGATIVE — SIGNIFICANT CHANGE UP
GLUCOSE SERPL-MCNC: 94 MG/DL — SIGNIFICANT CHANGE UP (ref 70–99)
GLUCOSE UR QL: NEGATIVE MG/DL — SIGNIFICANT CHANGE UP
HCT VFR BLD CALC: 43.9 % — SIGNIFICANT CHANGE UP (ref 39–50)
HGB BLD-MCNC: 14.5 G/DL — SIGNIFICANT CHANGE UP (ref 13–17)
IMM GRANULOCYTES # BLD AUTO: 0.01 K/UL — SIGNIFICANT CHANGE UP (ref 0–0.07)
IMM GRANULOCYTES NFR BLD AUTO: 0.2 % — SIGNIFICANT CHANGE UP (ref 0–0.9)
KETONES UR QL: NEGATIVE MG/DL — SIGNIFICANT CHANGE UP
LEUKOCYTE ESTERASE UR-ACNC: NEGATIVE — SIGNIFICANT CHANGE UP
LYMPHOCYTES # BLD AUTO: 1.08 K/UL — SIGNIFICANT CHANGE UP (ref 1–3.3)
LYMPHOCYTES NFR BLD AUTO: 18.8 % — SIGNIFICANT CHANGE UP (ref 13–44)
MCHC RBC-ENTMCNC: 29.5 PG — SIGNIFICANT CHANGE UP (ref 27–34)
MCHC RBC-ENTMCNC: 33 G/DL — SIGNIFICANT CHANGE UP (ref 32–36)
MCV RBC AUTO: 89.2 FL — SIGNIFICANT CHANGE UP (ref 80–100)
METHADONE UR-MCNC: NEGATIVE — SIGNIFICANT CHANGE UP
MONOCYTES # BLD AUTO: 0.31 K/UL — SIGNIFICANT CHANGE UP (ref 0–0.9)
MONOCYTES NFR BLD AUTO: 5.4 % — SIGNIFICANT CHANGE UP (ref 2–14)
NEUTROPHILS # BLD AUTO: 4.29 K/UL — SIGNIFICANT CHANGE UP (ref 1.8–7.4)
NEUTROPHILS NFR BLD AUTO: 74.9 % — SIGNIFICANT CHANGE UP (ref 43–77)
NITRITE UR-MCNC: NEGATIVE — SIGNIFICANT CHANGE UP
NRBC # BLD AUTO: 0 K/UL — SIGNIFICANT CHANGE UP (ref 0–0)
NRBC # FLD: 0 K/UL — SIGNIFICANT CHANGE UP (ref 0–0)
NRBC BLD AUTO-RTO: 0 /100 WBCS — SIGNIFICANT CHANGE UP (ref 0–0)
OPIATES UR-MCNC: NEGATIVE — SIGNIFICANT CHANGE UP
PCP SPEC-MCNC: SIGNIFICANT CHANGE UP
PCP UR-MCNC: NEGATIVE — SIGNIFICANT CHANGE UP
PH UR: 7 — SIGNIFICANT CHANGE UP (ref 5–8)
PLATELET # BLD AUTO: 242 K/UL — SIGNIFICANT CHANGE UP (ref 150–400)
PMV BLD: 9.7 FL — SIGNIFICANT CHANGE UP (ref 7–13)
POTASSIUM SERPL-MCNC: 4 MMOL/L — SIGNIFICANT CHANGE UP (ref 3.5–5.3)
POTASSIUM SERPL-SCNC: 4 MMOL/L — SIGNIFICANT CHANGE UP (ref 3.5–5.3)
PROT SERPL-MCNC: 6.7 GM/DL — SIGNIFICANT CHANGE UP (ref 6–8.3)
PROT UR-MCNC: NEGATIVE MG/DL — SIGNIFICANT CHANGE UP
RBC # BLD: 4.92 M/UL — SIGNIFICANT CHANGE UP (ref 4.2–5.8)
RBC # FLD: 12.2 % — SIGNIFICANT CHANGE UP (ref 10.3–14.5)
SALICYLATES SERPL-MCNC: <1.7 MG/DL — LOW (ref 2.8–20)
SODIUM SERPL-SCNC: 138 MMOL/L — SIGNIFICANT CHANGE UP (ref 135–145)
SP GR SPEC: 1.01 — SIGNIFICANT CHANGE UP (ref 1–1.03)
THC UR QL: NEGATIVE — SIGNIFICANT CHANGE UP
UROBILINOGEN FLD QL: 0.2 MG/DL — SIGNIFICANT CHANGE UP (ref 0.2–1)
WBC # BLD: 5.73 K/UL — SIGNIFICANT CHANGE UP (ref 3.8–10.5)
WBC # FLD AUTO: 5.73 K/UL — SIGNIFICANT CHANGE UP (ref 3.8–10.5)

## 2025-06-26 PROCEDURE — 85025 COMPLETE CBC W/AUTO DIFF WBC: CPT

## 2025-06-26 PROCEDURE — 80053 COMPREHEN METABOLIC PANEL: CPT

## 2025-06-26 PROCEDURE — 36415 COLL VENOUS BLD VENIPUNCTURE: CPT

## 2025-06-26 PROCEDURE — 80307 DRUG TEST PRSMV CHEM ANLYZR: CPT

## 2025-06-26 PROCEDURE — 93005 ELECTROCARDIOGRAM TRACING: CPT

## 2025-06-26 PROCEDURE — 99285 EMERGENCY DEPT VISIT HI MDM: CPT

## 2025-06-26 PROCEDURE — 81003 URINALYSIS AUTO W/O SCOPE: CPT

## 2025-06-26 PROCEDURE — 99284 EMERGENCY DEPT VISIT MOD MDM: CPT | Mod: 25

## 2025-06-26 PROCEDURE — 93010 ELECTROCARDIOGRAM REPORT: CPT

## 2025-06-26 PROCEDURE — 90792 PSYCH DIAG EVAL W/MED SRVCS: CPT | Mod: 95

## 2025-06-26 RX ORDER — LORAZEPAM 4 MG/ML
1 VIAL (ML) INJECTION ONCE
Refills: 0 | Status: DISCONTINUED | OUTPATIENT
Start: 2025-06-26 | End: 2025-06-26

## 2025-06-26 RX ADMIN — Medication 1 MILLIGRAM(S): at 15:01

## 2025-06-26 NOTE — ED PROVIDER NOTE - NSFOLLOWUPCLINICS_GEN_ALL_ED_FT
[facility] Dept of Substance Abuse  Psychiatry Substance Abuse  [address] [place] [address]  Phone: [phone]  Fax:   Follow Up Time: 1-3 Days    [facility]  Psychiatry  [address] [place] [address]  Phone: [phone]  Fax:   Follow Up Time: 1-3 Days    [facility]  [place] [address]  Phone: [phone]  Fax:   Follow Up Time: 1-3 Days

## 2025-06-26 NOTE — ED BEHAVIORAL HEALTH ASSESSMENT NOTE - NSBHATTESTBILLING_PSY_A_CORE
<--- Click to Launch ICDx for PreOp, PostOp and Procedure [id number] diagnostic evaluation with medical services

## 2025-06-26 NOTE — ED BEHAVIORAL HEALTH ASSESSMENT NOTE - DETAILS
after hours and detailed documentation from recent admissions available  5/21-5/28/2025 then [facility] 6/14-6/17/2025 Pt reports a dystonic reaction w/ Vraylar & dependence w/ Adderall as per HPI [facility]erred denies SI, committed to seeking help in hospital if ever in crisis denies self referred

## 2025-06-26 NOTE — ED ADULT TRIAGE NOTE - CHIEF COMPLAINT QUOTE
pt presents to the ED for panic attack and evaluation pt states feels like hes not comfortable and not able to manage affairs. pt denies SI / HI. NKDA. hx of panic attacks. no other complaints pt is calm and cooperative

## 2025-06-26 NOTE — ED PROVIDER NOTE - NSFOLLOWUPINSTRUCTIONS_ED_ALL_ED_FT
Please follow up with you PCP in the next 2 days  Please follow up with psych within the next 2 days    Return to the Emergency Department for worsening or persistent symptoms, and/or ANY NEW OR CONCERNING SYMPTOMS. If you have issues obtaining follow up, please call: [phone] or [phone]  to obtain a doctor or specialist who takes your insurance in your area.    Please return to the ED for any new or worsening problems including but not limited to: fever, chills, headache, chest pain, shortness of breath, palpitations, abdominal pain, nausea, vomiting, diarrhea, numbness or weakness.

## 2025-06-26 NOTE — ED PROVIDER NOTE - OBJECTIVE STATEMENT
29-year-old male with past medical history of anxiety, depression, ADHD, on fluoxetine, with complaint of worsening anxiety.  States he usually takes 1 mg of Ativan p.o. for this.  Has outpatient counselor.  However he thinks that he needs inpatient 5 N. admission due to the severity of his anxiety.  He is not able to function at home.  Denies suicidal thoughts or homicidal thoughts, denies AVH.  Denies illicit drug use.  Denies alcohol.

## 2025-06-26 NOTE — ED BEHAVIORAL HEALTH ASSESSMENT NOTE - NSTXRELFACTOR_PSY_ALL_CORE
Non-compliant or not receiving treatment/Recent inpatient discharge Recent inpatient discharge/Hopeless about or dissatisfied with provider or treatment

## 2025-06-26 NOTE — ED PROVIDER NOTE - PROGRESS NOTE DETAILS
I, Dr. Tidwell, received this patient in sign out at 1600 from Dr. Hardy pending psych eval per patient request. is not on a 1:1 Spoke with telepsych, patient is cleared for discharge.  Patient does not endorse SI or HI.  Does not recommend giving patient any more benzodiazepines.  Patient was medically stable.  Will discharge patient home with follow-up and return precautions.

## 2025-06-26 NOTE — ED PROVIDER NOTE - PATIENT PORTAL LINK FT
You can access the FollowMyHealth Patient Portal offered by [facility] by registering at the following website: [email or web address]. By joining [employer]’s FollowMyHealth portal, you will also be able to view your health information using other applications (apps) compatible with our system.

## 2025-06-26 NOTE — ED PROVIDER NOTE - PHYSICAL EXAMINATION
Constitutional: anxious AOx3  Eyes: PERRL EOMI  Head: Normocephalic atraumatic  Mouth: MMM  Cardiac: regular rate and rhythm  Resp: Lungs CTAB  GI: Abd s/nd/nt  Neuro: CN2-12 grossly intact, NICKERSON x 4  Skin: No visible rashes

## 2025-06-26 NOTE — ED PROVIDER NOTE - CLINICAL SUMMARY MEDICAL DECISION MAKING FREE TEXT BOX
Plan for medical clearance for psych evaluation.  Patient does appear very anxious.  Will give 1 mg Ativan p.o. Plan for medical clearance for psych evaluation.  Patient does appear very anxious.  Will give 1 mg Ativan p.o.    4pm: d/w dr oro. plan for telepsych eval. s/o to dr badillo pending psych eval.

## 2025-06-26 NOTE — ED ADULT NURSE NOTE - OBJECTIVE STATEMENT
Pt presents to ED w/ panic attacks. States he has been having worsening anxiety, started on prozac on 6/16. Wants admission to manage his anxiety, states he has been unable to function at home. Denies SI/HI/AVH.

## 2025-06-26 NOTE — ED BEHAVIORAL HEALTH ASSESSMENT NOTE - NSBHMSETHTCONTENT_PSY_A_CORE
anxious preoccupations on panic symptoms, recent admissions and medication management/Obsessions/Preoccupations/Ruminations Preoccupations/Ruminations

## 2025-06-26 NOTE — ED BEHAVIORAL HEALTH ASSESSMENT NOTE - RISK ASSESSMENT
low risk as denying SI, no prior history of suicidal behavior  chronic risk factor of poor tolerance to anxiety symptoms, hyperalertness/hypersensitivity and symptom preoccupation

## 2025-06-26 NOTE — ED BEHAVIORAL HEALTH ASSESSMENT NOTE - SUMMARY
28 yo male with anxiety w/ panic attacks, OCD, ADHD, multiple recent psychiatric admissions though most recently signed 72h letter right away, no previous suicidal behavior, hx of benzo seeking behavior, presenting with request for outpatient Ativan prescription, denying any SI. Based on review of recent notes detailing multiple evaluations, patient appears to be at his baseline. On MSE he is calm, in no acute distress, though he was assessed after administration of Ativan. He continues to exhibit fixation on occasional panic attacks and idea of helplessness and dependence on Ativan, but does not present with any factors indicating any increased risk of danger. He was provided with additional psychoeducation on remaining consistent w/ outpatient f/u and on approach to managing anxiety without benzos. Pt does not meet criteria for psychiatric hospitalization at this time.

## 2025-06-26 NOTE — ED BEHAVIORAL HEALTH ASSESSMENT NOTE - HPI (INCLUDE ILLNESS QUALITY, SEVERITY, DURATION, TIMING, CONTEXT, MODIFYING FACTORS, ASSOCIATED SIGNS AND SYMPTOMS)
28 yo male, domiciled with father, unemployed, with history of anxiety, panic disorder, OCD, bipolar vs other affective disorder, ADHD, hx of stimulant induced psychotic disorder, two recent psychiatric hospitalizations including d/c from [facility] 6/17, referred for outpatient care at [facility] where he had initial appointment 6/20, presenting to the ER again requesting Ativan for anxiety.    Patient is well-known to the  team, was evaluated one week ago for the same issues and found to be at baseline and discharged. Pt reports he attended his outpatient appointment but they have a policy against prescribing controlled substances. He reports hoping for an outpatient prescription of Ativan from the ER. Writer informed patient this cannot be done, and patient simply says he will have to find another outpatient provider eventually. He describes symptoms very vaguely, saying he's had panic attacks that make it difficult to "function normally," reports having a "mindset lockup." Reports one trigger was having a job interview that he felt didn't go well, and perseverating on it. Reports that led to a panic attack which prompted him to come to the ER. Despite saying the panic attacks are extremely impairing, he reports having them only about once a week. He reports noticing some benefit on Prozac which he says he has been taking consistently. Patient also reports feeling significantly better after being given Ativan in the ER and does not have any objection to returning home. He denies any SI and reports if ever in distress he cannot cope with, he would come to the hospital. Psychoeducation was provided around need for consistent outpatient treatment, and ultimate goal of removing benzo dependence. Pt expressed understanding.

## 2025-06-26 NOTE — ED PROVIDER NOTE - NSFOLLOWUPCLINICSTOKEN_GEN_ALL_ED_FT
206969:1-3 Days|| ||00\01||False;407226:1-3 Days|| ||00\01||False;571182:1-3 Days|| ||00\01||False; none

## 2025-06-26 NOTE — ED BEHAVIORAL HEALTH ASSESSMENT NOTE - OTHER
mostly fair w/ intermittent intense/wide-eyed contact intermittent anxious inattentiveness on observation "better"

## 2025-06-26 NOTE — ED ADULT NURSE NOTE - ORIENTED TO TIME
68y female POD#2 s/p LAR with diverting loop ileostomy, R salpingoophorectomy, D&C, stable and doing well postoperatively.     - No GYN concerns, as per GYN  - Diet: Tolerating clears, will ADAT   - Venodynes, Lovenox for DVT ppx   - OOB, ambulation   - Will f/u AM labs  - Will f/u PT recs for outpatient recs     [id number]     68y female POD#2 s/p LAR with diverting loop ileostomy, R salpingoophorectomy, D&C, stable and doing well postoperatively.     - No GYN concerns, as per GYN  - Diet: Will advance to regular diet today  - Venodynes, Lovenox for DVT ppx   - OOB, ambulation   - Will f/u AM labs  - Will f/u PT recs for outpatient recs     [id number]     Yes

## 2025-06-26 NOTE — ED BEHAVIORAL HEALTH ASSESSMENT NOTE - DESCRIPTION
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

## 2025-06-26 NOTE — ED ADULT NURSE REASSESSMENT NOTE - NS ED NURSE REASSESS COMMENT FT1
received pt from day RN Abhishek Veloz. pt resting comfortably at this time. no signs of distress noted. no further complaints or discomforts reported at this time. safety and comfort maintained.

## 2025-06-28 ENCOUNTER — INPATIENT (INPATIENT)
Facility: HOSPITAL | Age: 30
LOS: 3 days | Discharge: HOME | End: 2025-07-02
Attending: PSYCHIATRY & NEUROLOGY | Admitting: PSYCHIATRY & NEUROLOGY
Payer: MEDICAID

## 2025-06-28 VITALS
HEIGHT: 68 IN | WEIGHT: 130.07 LBS | TEMPERATURE: 98 F | DIASTOLIC BLOOD PRESSURE: 76 MMHG | HEART RATE: 61 BPM | SYSTOLIC BLOOD PRESSURE: 118 MMHG | OXYGEN SATURATION: 98 % | RESPIRATION RATE: 16 BRPM

## 2025-06-28 DIAGNOSIS — F41.9 ANXIETY DISORDER, UNSPECIFIED: ICD-10-CM

## 2025-06-28 LAB
ALBUMIN SERPL ELPH-MCNC: 4.7 G/DL — SIGNIFICANT CHANGE UP (ref 3.3–5)
ALP SERPL-CCNC: 49 U/L — SIGNIFICANT CHANGE UP (ref 40–120)
ALT FLD-CCNC: 19 U/L — SIGNIFICANT CHANGE UP (ref 4–41)
AMPHET UR-MCNC: POSITIVE
ANION GAP SERPL CALC-SCNC: 14 MMOL/L — SIGNIFICANT CHANGE UP (ref 7–14)
APAP SERPL-MCNC: <10 UG/ML — LOW (ref 15–25)
APPEARANCE UR: CLEAR — SIGNIFICANT CHANGE UP
AST SERPL-CCNC: 15 U/L — SIGNIFICANT CHANGE UP (ref 4–40)
BARBITURATES UR SCN-MCNC: NEGATIVE — SIGNIFICANT CHANGE UP
BASOPHILS # BLD AUTO: 0.04 K/UL — SIGNIFICANT CHANGE UP (ref 0–0.2)
BASOPHILS NFR BLD AUTO: 0.6 % — SIGNIFICANT CHANGE UP (ref 0–2)
BENZODIAZ UR-MCNC: NEGATIVE — SIGNIFICANT CHANGE UP
BILIRUB SERPL-MCNC: 0.4 MG/DL — SIGNIFICANT CHANGE UP (ref 0.2–1.2)
BILIRUB UR-MCNC: NEGATIVE — SIGNIFICANT CHANGE UP
BUN SERPL-MCNC: 30 MG/DL — HIGH (ref 7–23)
CALCIUM SERPL-MCNC: 9.7 MG/DL — SIGNIFICANT CHANGE UP (ref 8.4–10.5)
CHLORIDE SERPL-SCNC: 103 MMOL/L — SIGNIFICANT CHANGE UP (ref 98–107)
CO2 SERPL-SCNC: 22 MMOL/L — SIGNIFICANT CHANGE UP (ref 22–31)
COCAINE METAB.OTHER UR-MCNC: NEGATIVE — SIGNIFICANT CHANGE UP
COLOR SPEC: YELLOW — SIGNIFICANT CHANGE UP
CREAT SERPL-MCNC: 1.14 MG/DL — SIGNIFICANT CHANGE UP (ref 0.5–1.3)
CREATININE URINE RESULT, DAU: 130 MG/DL — SIGNIFICANT CHANGE UP
DIFF PNL FLD: NEGATIVE — SIGNIFICANT CHANGE UP
EGFR: 89 ML/MIN/1.73M2 — SIGNIFICANT CHANGE UP
EGFR: 89 ML/MIN/1.73M2 — SIGNIFICANT CHANGE UP
EOSINOPHIL # BLD AUTO: 0.05 K/UL — SIGNIFICANT CHANGE UP (ref 0–0.5)
EOSINOPHIL NFR BLD AUTO: 0.7 % — SIGNIFICANT CHANGE UP (ref 0–6)
ETHANOL SERPL-MCNC: <10 MG/DL — SIGNIFICANT CHANGE UP
FENTANYL UR QL SCN: NEGATIVE — SIGNIFICANT CHANGE UP
GLUCOSE SERPL-MCNC: 90 MG/DL — SIGNIFICANT CHANGE UP (ref 70–99)
GLUCOSE UR QL: NEGATIVE MG/DL — SIGNIFICANT CHANGE UP
HCT VFR BLD CALC: 45.3 % — SIGNIFICANT CHANGE UP (ref 39–50)
HGB BLD-MCNC: 15 G/DL — SIGNIFICANT CHANGE UP (ref 13–17)
IMM GRANULOCYTES # BLD AUTO: 0.02 K/UL — SIGNIFICANT CHANGE UP (ref 0–0.07)
IMM GRANULOCYTES NFR BLD AUTO: 0.3 % — SIGNIFICANT CHANGE UP (ref 0–0.9)
KETONES UR QL: NEGATIVE MG/DL — SIGNIFICANT CHANGE UP
LEUKOCYTE ESTERASE UR-ACNC: NEGATIVE — SIGNIFICANT CHANGE UP
LYMPHOCYTES # BLD AUTO: 1.7 K/UL — SIGNIFICANT CHANGE UP (ref 1–3.3)
LYMPHOCYTES NFR BLD AUTO: 25 % — SIGNIFICANT CHANGE UP (ref 13–44)
MCHC RBC-ENTMCNC: 29.5 PG — SIGNIFICANT CHANGE UP (ref 27–34)
MCHC RBC-ENTMCNC: 33.1 G/DL — SIGNIFICANT CHANGE UP (ref 32–36)
MCV RBC AUTO: 89.2 FL — SIGNIFICANT CHANGE UP (ref 80–100)
METHADONE UR-MCNC: NEGATIVE — SIGNIFICANT CHANGE UP
MONOCYTES # BLD AUTO: 0.44 K/UL — SIGNIFICANT CHANGE UP (ref 0–0.9)
MONOCYTES NFR BLD AUTO: 6.5 % — SIGNIFICANT CHANGE UP (ref 2–14)
NEUTROPHILS # BLD AUTO: 4.55 K/UL — SIGNIFICANT CHANGE UP (ref 1.8–7.4)
NEUTROPHILS NFR BLD AUTO: 66.9 % — SIGNIFICANT CHANGE UP (ref 43–77)
NITRITE UR-MCNC: NEGATIVE — SIGNIFICANT CHANGE UP
NRBC # BLD AUTO: 0 K/UL — SIGNIFICANT CHANGE UP (ref 0–0)
NRBC # FLD: 0 K/UL — SIGNIFICANT CHANGE UP (ref 0–0)
NRBC BLD AUTO-RTO: 0 /100 WBCS — SIGNIFICANT CHANGE UP (ref 0–0)
OPIATES UR-MCNC: NEGATIVE — SIGNIFICANT CHANGE UP
OXYCODONE UR-MCNC: NEGATIVE — SIGNIFICANT CHANGE UP
PCP SPEC-MCNC: SIGNIFICANT CHANGE UP
PCP UR-MCNC: NEGATIVE — SIGNIFICANT CHANGE UP
PH UR: 6 — SIGNIFICANT CHANGE UP (ref 5–8)
PLATELET # BLD AUTO: 264 K/UL — SIGNIFICANT CHANGE UP (ref 150–400)
PMV BLD: 9.9 FL — SIGNIFICANT CHANGE UP (ref 7–13)
POTASSIUM SERPL-MCNC: 4.2 MMOL/L — SIGNIFICANT CHANGE UP (ref 3.5–5.3)
POTASSIUM SERPL-SCNC: 4.2 MMOL/L — SIGNIFICANT CHANGE UP (ref 3.5–5.3)
PROT SERPL-MCNC: 6.9 G/DL — SIGNIFICANT CHANGE UP (ref 6–8.3)
PROT UR-MCNC: SIGNIFICANT CHANGE UP MG/DL
RBC # BLD: 5.08 M/UL — SIGNIFICANT CHANGE UP (ref 4.2–5.8)
RBC # FLD: 12.2 % — SIGNIFICANT CHANGE UP (ref 10.3–14.5)
SALICYLATES SERPL-MCNC: <0.3 MG/DL — LOW (ref 15–30)
SODIUM SERPL-SCNC: 139 MMOL/L — SIGNIFICANT CHANGE UP (ref 135–145)
SP GR SPEC: 1.02 — SIGNIFICANT CHANGE UP (ref 1–1.03)
THC UR QL: NEGATIVE — SIGNIFICANT CHANGE UP
TOXICOLOGY SCREEN, DRUGS OF ABUSE, SERUM RESULT: SIGNIFICANT CHANGE UP
TSH SERPL-MCNC: 1.84 UIU/ML — SIGNIFICANT CHANGE UP (ref 0.27–4.2)
UROBILINOGEN FLD QL: 1 MG/DL — SIGNIFICANT CHANGE UP (ref 0.2–1)
WBC # BLD: 6.8 K/UL — SIGNIFICANT CHANGE UP (ref 3.8–10.5)
WBC # FLD AUTO: 6.8 K/UL — SIGNIFICANT CHANGE UP (ref 3.8–10.5)

## 2025-06-28 PROCEDURE — 99285 EMERGENCY DEPT VISIT HI MDM: CPT | Mod: GC

## 2025-06-28 PROCEDURE — 99285 EMERGENCY DEPT VISIT HI MDM: CPT

## 2025-06-28 RX ORDER — OLANZAPINE 10 MG/1
5 TABLET ORAL ONCE
Refills: 0 | Status: DISCONTINUED | OUTPATIENT
Start: 2025-06-29 | End: 2025-07-02

## 2025-06-28 RX ORDER — HYDROXYZINE HYDROCHLORIDE 25 MG/1
25 TABLET, FILM COATED ORAL
Refills: 0 | Status: DISCONTINUED | OUTPATIENT
Start: 2025-06-29 | End: 2025-07-02

## 2025-06-28 RX ORDER — FLUOXETINE HYDROCHLORIDE 20 MG/1
20 CAPSULE ORAL DAILY
Refills: 0 | Status: DISCONTINUED | OUTPATIENT
Start: 2025-06-29 | End: 2025-07-02

## 2025-06-28 NOTE — ED BEHAVIORAL HEALTH ASSESSMENT NOTE - OTHER
"better" ISTOP Reference #[id number] initially anxiously perseverative; becomes increasingly hostile when denied Ativan unable to care for self iso of acute psychiatric illness

## 2025-06-28 NOTE — ED BEHAVIORAL HEALTH ASSESSMENT NOTE - VIOLENCE PROTECTIVE FACTORS:
Residential stability/Sobriety/Insight into violence risk and need for management/treatment Residential stability/Sobriety/Engagement in treatment/Insight into violence risk and need for management/treatment

## 2025-06-28 NOTE — ED ADULT TRIAGE NOTE - CHIEF COMPLAINT QUOTE
pt states im having anxiety and panic attacks/ pt takes prozac and took 1 atarol  which help to calm him  he states denies  HI  had thought of hurting  this past week pt did not act upon thought s  father

## 2025-06-28 NOTE — ED BEHAVIORAL HEALTH ASSESSMENT NOTE - NSSUICPROTFACT_PSY_ALL_CORE
Identifies reasons for living/Supportive social network of family or friends/Fear of death or the actual act of killing self/Other Identifies reasons for living/Supportive social network of family or friends/Fear of death or the actual act of killing self/Positive therapeutic relationships/Other

## 2025-06-28 NOTE — ED BEHAVIORAL HEALTH ASSESSMENT NOTE - PAST PSYCHOTROPIC MEDICATION
Ativan, Prozac (x1 year in the past), Lexapro (x 1 month following 2/2025  IPP), Adderall x8 years (dependence), Buspar & Vraylar Per chart review: Ativan, Prozac (x1 year in the past), Lexapro (x 1 month following 2/2025  IPP), Adderall x8 years (dependence), Buspar & Vraylar

## 2025-06-28 NOTE — ED BEHAVIORAL HEALTH ASSESSMENT NOTE - NSBHSACONSEQUENCE_PSY_A_CORE FT
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

## 2025-06-28 NOTE — ED BEHAVIORAL HEALTH ASSESSMENT NOTE - NSBHMSEBEHAV_PSY_A_CORE
anxiously perseverative w/ gross inattention iso anxiety intermittently/Cooperative anxiously perseverative w/ gross inattention iso anxiety intermittently/Other

## 2025-06-28 NOTE — ED BEHAVIORAL HEALTH ASSESSMENT NOTE - NSBHMSETHTCONTENT_PSY_A_CORE
add Durezol one drop 4 x a day in the right eye and see Dr. uHgo Xiao next day. Preoccupations/Ruminations

## 2025-06-28 NOTE — ED BEHAVIORAL HEALTH ASSESSMENT NOTE - SUMMARY
30 yo male with anxiety w/ panic attacks, OCD, ADHD, multiple recent psychiatric admissions though most recently signed 72h letter right away, no previous suicidal behavior, hx of benzo seeking behavior, presenting with request for outpatient Ativan prescription, denying any SI. Based on review of recent notes detailing multiple evaluations, patient appears to be at his baseline. On MSE he is calm, in no acute distress, though he was assessed after administration of Ativan. He continues to exhibit fixation on occasional panic attacks and idea of helplessness and dependence on Ativan, but does not present with any factors indicating any increased risk of danger. He was provided with additional psychoeducation on remaining consistent w/ outpatient f/u and on approach to managing anxiety without benzos. Pt does not meet criteria for psychiatric hospitalization at this time. 28 yo male, domiciled with father and sister, unemployed (last employed as a  ~1 year ago),history of anxiety, panic disorder, OCD, bipolar vs other affective disorder, ADHD, hx of stimulant induced psychotic disorder, two recent psychiatric hospitalizations including d/c from [facility] 6/17, referred for outpatient care at [facility] where he had initial appointment 6/20 and 6/23, multiple ED visits, s/p discharge from [facility] on 6/19 and 6/26, denies hx of SAs/NSSIB, BIB self, requesting voluntary psychiatric admission for management of anxiety.     Although patient subjectively reports severe anxiety, there is a suspicion request for voluntary admission is predicated by secondary gain, especially in the context of frequent ED visits and recently signing 72h letter right away after voluntary admission to [facility]. Despite this, patient does present with acute psychiatric symptoms and has suboptimal response on current medication regimen. Patient also subjectively reports significant distress from symptoms and family is strongly advocating for admission.  As such, patient meets criteria for voluntary admission. Patient was offered voluntary admission (x 2 during today's ED visit) and accepted, upon return to the ED.     PLAN:   1. Admit on 9.13 legal status  2. Safety: no need for CO 1:1, no current SIIP/HIIP  3. Psychiatric:  - c/w Prozac 20mg daily for now. Defer to primary team for further medication management.     -----> PLEASE AVOID USE OF BENZO   For agitation please attempt verbal de-escalation and behavioral intervention first. If patient does not respond to above, may give Zyprexa 5 mg po q6h prn if no contraindications. If patient is refusing PO, remains an imminent danger to self or others and If Patient's  qtc <500, can escalate to IM formulation. If IM antipsychotic is administered, please perform follow-up ECG for QTc monitoring.  *Please avoid use of IM Ativan within 2-3 hours of administration of Zyprexa IM due to risk of cardiopulmonary collapse  *Please avoid use of antipsychotics if QTC>500  - Start Zyprexa 5mg for management of acute manic symptoms, hold dose tonight given patient already received Haldol 5mg IM  4. Medical:  - no known significant PMHx, Admission labs/CT WNL  5. Individual/group/milleu therapy, as indicated  6. Dispo: pending clinical improvement 28 yo male, domiciled with father and sister, unemployed (last employed as a  ~1 year ago),history of anxiety, panic disorder, OCD, bipolar vs other affective disorder, ADHD, hx of stimulant induced psychotic disorder, two recent psychiatric hospitalizations including d/c from [facility] 6/17, referred for outpatient care at [facility] where he had initial appointment 6/20 and 6/23, multiple ED visits, s/p discharge from [facility] on 6/19 and 6/26, denies hx of SAs/NSSIB, BIB self, requesting voluntary psychiatric admission for management of anxiety.     Although patient subjectively reports severe anxiety, there is a suspicion request for voluntary admission is predicated by secondary gain, especially in the context of frequent ED visits and recently signing 72h letter right away after voluntary admission to [facility]. Despite this, patient does present with acute psychiatric symptoms and has suboptimal response on current medication regimen. Patient also subjectively reports significant distress from symptoms and family is strongly advocating for admission.  As such, patient meets criteria for voluntary admission. Patient was offered voluntary admission (x 2 during today's ED visit) and accepted, upon return to the ED.     PLAN:   1. Admit on 9.13 legal status  2. Safety: no need for CO 1:1, no current SIIP/HIIP  3. Psychiatric:  - c/w home medication of Prozac 20mg daily for now. Defer to primary team for further medication management.   -----> PLEASE AVOID USE OF BENZODIAZEPINES FOR ANXIETY    PRNs: Please attempt verbal de-escalation and behavioral intervention first  Agitation: Zyprexa 5mg PO/IM q6h prn  Anxiety: Atarax 25mg BID   *Please avoid use of antipsychotics if QTC>500  4. Medical:  - no known significant PMHx, Admission labs/CT WNL  5. Individual/group/milleu therapy, as indicated  6. Dispo: pending clinical improvement 30 yo male, domiciled with father and sister, unemployed (last employed as a  ~1 year ago),history of anxiety, panic disorder, OCD, bipolar vs other affective disorder, ADHD, hx of stimulant induced psychotic disorder, two recent psychiatric hospitalizations including d/c from [facility] 6/17, referred for outpatient care at [place] where he had initial appointment 6/20 and 6/23, multiple ED visits, s/p discharge from [facility] on 6/19 and 6/26, denies hx of SAs/NSSIB, BIB self, requesting voluntary psychiatric admission for management of anxiety.     Although patient subjectively reports severe anxiety, there is a suspicion that request for voluntary admission is predicated on secondary gain, especially in the context of frequent ED visits and recently signing 72h letter right away after voluntary admission to [facility]. Despite this, patient does present with acute psychiatric symptoms and has suboptimal response on current medication regimen. Patient also subjectively reports significant distress from symptoms and family is strongly advocating for admission.  As such, patient meets criteria for voluntary admission. Patient was offered voluntary admission (x 2 during today's ED visit) and accepted, upon return to the ED.     PLAN:   1. Admit on 9.13 legal status  2. Safety: no need for CO 1:1, no current SIIP/HIIP  3. Psychiatric:  - c/w home medication of Prozac 20mg daily for now. Defer to primary team for further medication management.   -----> PLEASE AVOID USE OF BENZODIAZEPINES FOR ANXIETY    PRNs: Please attempt verbal de-escalation and behavioral intervention first  Agitation: Zyprexa 5mg PO/IM q6h prn  Anxiety: Atarax 25mg BID   *Please avoid use of antipsychotics if QTC>500  4. Medical:  - no known significant PMHx, Admission labs/CT WNL  5. Individual/group/milleu therapy, as indicated  6. Dispo: pending clinical improvement

## 2025-06-28 NOTE — ED BEHAVIORAL HEALTH ASSESSMENT NOTE - HPI (INCLUDE ILLNESS QUALITY, SEVERITY, DURATION, TIMING, CONTEXT, MODIFYING FACTORS, ASSOCIATED SIGNS AND SYMPTOMS)
28 yo male, domiciled with father and sister, unemployed (last employed as a  ~1 year ago),history of anxiety, panic disorder, OCD, bipolar vs other affective disorder, ADHD, hx of stimulant induced psychotic disorder, two recent psychiatric hospitalizations including d/c from [facility] 6/17, referred for outpatient care at [facility] where he had initial appointment 6/20, multiple ED visits, s/p discharge from [facility] on 6/19 and 6/26, BIB self, requesting voluntary psychiatric admission for management of anxiety.     On exam, patient is initially calm and cooperative, but becomes intermittently agitated, throughout the assessment, when challenged about requests for Ativan and use of the phone. Of note, patient perservates on request for Ativan throughout interview. Upon approach, patient immediately asks this writer for "one time dose of Ativan" and states that he brought himself to the ED to possibly obtain a prescription for Ativan.     Patient subjectively reports chronic anxiety symptoms, as he states that he has been "in and out of the hospital a lot since February", although he provides vague description of these symptoms when asked to elaborate. Patient endorses severe anxiety attacks "due to my life circumstances". Patient describes panic attacks as "crying, screaming, yelling". Patient identifies multiple stressors including uncertainty about recent job interview and financial instability. When asked about reasons for recent request for discharge while voluntarily admitted to [facility], patient states that he "really bad visit, and I cannot go back to that hospital". Patient also states, "I had a prescription for Ativan" but I threw it away because I was at [facility], and they stopped it". When informed that patient cannot be assured that Ativan will prescribed on inpatient unit, if admitted, patient rescinds his request for voluntary hospitalization. Patient acknowledges that he had recent intake with [facility] for continuation of outpatient care. He states that he would like to "continue Prozac with them". States but was informed of their policy about controlled substances (states that they would not prescribe this) and wanted to         Patient acknowledges multiple recent visits to the ED     Patient is well-known to the  team, was evaluated one week ago for the same issues and found to be at baseline and discharged. Pt reports he attended his outpatient appointment but they have a policy against prescribing controlled substances. He reports hoping for an outpatient prescription of Ativan from the ER. Writer informed patient this cannot be done, and patient simply says he will have to find another outpatient provider eventually. He describes symptoms very vaguely, saying he's had panic attacks that make it difficult to "function normally," reports having a "mindset lockup." Reports one trigger was having a job interview that he felt didn't go well, and perseverating on it. Reports that led to a panic attack which prompted him to come to the ER. Despite saying the panic attacks are extremely impairing, he reports having them only about once a week. He reports noticing some benefit on Prozac which he says he has been taking consistently. Patient also reports feeling significantly better after being given Ativan in the ER and does not have any objection to returning home. He denies any SI and reports if ever in distress he cannot cope with, he would come to the hospital. Psychoeducation was provided around need for consistent outpatient treatment, and ultimate goal of removing benzo dependence. Pt expressed understanding. 28 yo male, domiciled with father and sister, unemployed (last employed as a  ~1 year ago),history of anxiety, panic disorder, OCD, bipolar vs other affective disorder, ADHD, hx of stimulant induced psychotic disorder, two recent psychiatric hospitalizations including d/c from [facility] 6/17, referred for outpatient care at [facility] where he had initial appointment 6/20, multiple ED visits, s/p discharge from [facility] on 6/19 and 6/26, BIB self, requesting voluntary psychiatric admission for management of anxiety.     On exam, patient is initially calm and cooperative, but becomes intermittently agitated, throughout the assessment, when challenged about requests for Ativan and use of the phone. Of note, patient perseverates on request for Ativan throughout interview. Upon approach, patient immediately asks this writer for "one time dose of Ativan" and states that he brought himself to the ED to possibly obtain a prescription for Ativan.     Patient subjectively reports chronic anxiety symptoms, as he states that he has been "in and out of the hospital a lot since February", although he provides vague description of these symptoms when asked to elaborate. Patient endorses severe anxiety attacks "due to my life circumstances". Patient describes panic attacks as "crying, screaming, yelling". Patient identifies multiple stressors including uncertainty about recent job interview and financial instability. He states that he had recent intake with [facility] for continuation of outpatient care. He states that he would like to "continue Prozac with them" because he was informed of their no controlled substance policy. When asked about reasons for recent request for discharge while voluntarily admitted to [facility], patient states that he "really bad visit, and I cannot go back to that hospital". Patient also states, "I had a prescription for Ativan" but I threw it away because I was at [facility] and they stopped it". When informed that patient cannot be assured that Ativan will prescribed on inpatient unit, if admitted, patient rescinds his request for voluntary hospitalization and becomes increasingly hostile. Patient denies current SIIP/HIIP/AVH. Denies         Patient acknowledges multiple recent visits to the ED     Patient is well-known to the  team, was evaluated one week ago for the same issues and found to be at baseline and discharged. Pt reports he attended his outpatient appointment but they have a policy against prescribing controlled substances. He reports hoping for an outpatient prescription of Ativan from the ER. Writer informed patient this cannot be done, and patient simply says he will have to find another outpatient provider eventually. He describes symptoms very vaguely, saying he's had panic attacks that make it difficult to "function normally," reports having a "mindset lockup." Reports one trigger was having a job interview that he felt didn't go well, and perseverating on it. Reports that led to a panic attack which prompted him to come to the ER. Despite saying the panic attacks are extremely impairing, he reports having them only about once a week. He reports noticing some benefit on Prozac which he says he has been taking consistently. Patient also reports feeling significantly better after being given Ativan in the ER and does not have any objection to returning home. He denies any SI and reports if ever in distress he cannot cope with, he would come to the hospital. Psychoeducation was provided around need for consistent outpatient treatment, and ultimate goal of removing benzo dependence. Pt expressed understanding. 28 yo male, domiciled with father and sister, unemployed (last employed as a  ~1 year ago),history of anxiety, panic disorder, OCD, bipolar vs other affective disorder, ADHD, hx of stimulant induced psychotic disorder, two recent psychiatric hospitalizations including d/c from [facility] 6/17, referred for outpatient care at [facility] where he had initial appointment 6/20 and 6/23, multiple ED visits, s/p discharge from [facility] on 6/19 and 6/26, denies hx of SAs/NSSIB, BIB self, requesting voluntary psychiatric admission for management of anxiety.     On exam, patient is initially calm and cooperative, but becomes intermittently agitated, throughout the assessment, when challenged about requests for Ativan and use of the phone. Of note, patient perseverates on request for Ativan throughout interview. Upon approach, patient immediately asks this writer for "one time dose of Ativan" and states that he brought himself to the ED to possibly obtain a prescription for Ativan.     Patient subjectively reports chronic anxiety symptoms, as he states that he has been "in and out of the hospital a lot since February", although he provides vague description of these symptoms when asked to elaborate. Patient endorses severe anxiety attacks "due to my life circumstances". Patient reports experiencing a panic attack, earlier today, in which he states he was "crying, screaming, yelling". States that this panic attack prompted today's ED visit. Patient identifies multiple stressors including uncertainty about recent job interview and financial instability. He states that he had recent intake with [facility] for continuation of outpatient care. He states that he would like to "continue Prozac with them" because he was informed of their no controlled substance policy. When asked about reasons for recent request for discharge while voluntarily admitted to [facility], patient states that he "really bad visit, and I cannot go back to that hospital". Patient also states, "I had a prescription for Ativan" but I threw it away because I was at [facility] and they stopped it". When informed that patient cannot be assured that Ativan will prescribed on inpatient unit, if admitted, patient rescinds his request for voluntary hospitalization and becomes increasingly hostile. Patient initially states " I feel like I might be a harm to myself". When asked to elaborate, he states "just cutting" . Denies hx of or  recent NSSIB. Patient adamantly denies current SIIP/HIIP/AVH. Reports eating and sleeping appropriately (states that he has been sleeping ~8-12 hours/day).  No sxs of antonia or psychosis elicited. Denies substance use. Patient reports hx of Adderall use. States that he was last prescribed Adderall in October 2024 by his previous psychiatrist. He reports that this was discontinued due to suspected psychosis (states that he was throwing things around the house). Reports taking a dose of Adderall, which was given to him by his father.  before coming to the ED because "it helps with the anxiety and to stay focused". States that  Adderall was from his father's own prescribed supply. Reports that he was discharged from hospitalization at  in May 2025 on Prozac 20mg. Reports daily compliance with this patient, although he reports that "Ativan works better".     Addendum: Patient was determined not meet threshold for involuntary hospitalization and declined voluntary hospitalization. He was then subsequently discharged from [facility]. Patient returned to the ED requesting  voluntary admission, upon the behest of his family.     Collateral from father, Steve ([phone]): In the last year, father reports that patient's mental health has declined. He reports that patient has "fits", in which he appears frightened and is crying. States that patient has become increasingly "erratic". When asked to elaborate, he states that he "goes missing in the house" and "goes and sits in a dark room alone". Reports that he has become more reclusive. Also references an incident, occurring a year ago, in which the patient was "throwing all my things away". Denies patient has been agitated or aggressive towards others, but expresses safety concerns because "his sister is petrified".  About 1-1.5 months ago, he reports that patient disclosed to family that he wanted to "end it all". Father states that patient did not clearly specify whether this was in reference to the anxiety or self-harm. Reports that patient has never taken steps toward harming himself. States that patient has been maintaining ADLs independently. Expresses concerns about the patient showering for "2 hrs, up to 5 hours a day". Advocating for admission. 28 yo male, domiciled with father and sister, unemployed (last employed as a  ~1 year ago),history of anxiety, panic disorder, OCD, bipolar vs other affective disorder, ADHD, hx of stimulant induced psychotic disorder, two recent psychiatric hospitalizations including d/c from [facility] 6/17, referred for outpatient care at [facility] where he had initial appointment 6/20 and 6/23, multiple ED visits, s/p discharge from [facility] on 6/19 and 6/26, denies hx of SAs/NSSIB, BIB self, requesting voluntary psychiatric admission for management of anxiety.     On exam, patient is initially calm and cooperative, but becomes intermittently agitated, throughout the assessment, when challenged about requests for Ativan and use of the phone. Of note, patient perseverates on request for Ativan throughout interview. Upon approach, patient immediately asks this writer for "one time dose of Ativan" and states that he brought himself to the ED to possibly obtain a prescription for Ativan.     Patient subjectively reports chronic anxiety symptoms, as he states that he has been "in and out of the hospital a lot since February", although he provides vague description of these symptoms when asked to elaborate. Patient endorses severe anxiety attacks "due to my life circumstances". Patient reports experiencing a panic attack, earlier today, in which he states he was "crying, screaming, yelling". States that this panic attack prompted today's ED visit. Patient identifies multiple stressors including uncertainty about recent job interview and financial instability. He states that he had recent intake with [facility] for continuation of outpatient care. He states that he would like to "continue Prozac with them" because he was informed of their no controlled substance policy. When asked about reasons for recent request for discharge while voluntarily admitted to [facility], patient states that he "really bad visit, and I cannot go back to that hospital". Patient also states, "I had a prescription for Ativan" but I threw it away because I was at [facility] and they stopped it". When informed that patient cannot be assured that Ativan will prescribed on inpatient unit, if admitted, patient rescinds his request for voluntary hospitalization and becomes increasingly hostile. Patient initially states " I feel like I might be a harm to myself". When asked to elaborate, he states "just cutting" . Denies hx of or  recent NSSIB. Patient adamantly denies current SIIP/HIIP/AVH. Reports eating and sleeping appropriately (states that he has been sleeping ~8-12 hours/day).  No sxs of antonia or psychosis elicited. Denies substance use. Patient reports hx of Adderall use. States that he was last prescribed Adderall in October 2024 by his previous psychiatrist. He reports that this was discontinued due to suspected psychosis (states that he was throwing things around the house). Reports taking a dose of Adderall, which was given to him by his father.  before coming to the ED because "it helps with the anxiety and to stay focused". States that  Adderall was from his father's own prescribed supply. Reports that he was discharged from hospitalization at  in May 2025 on Prozac 20mg. Reports daily compliance with this patient, although he reports that "Ativan works better".     Addendum: Patient was determined not meet threshold for involuntary hospitalization and declined voluntary hospitalization. He was then subsequently discharged from [facility]. Patient returned to the ED requesting  voluntary admission, upon the behest of his family. When challenged about information provided by collateral, patient denies making statement about wanting to "end it all". Also states that he repeatedly takes showers in the home (and does pushups--which was observed in the ED) when distressed/anxious.     Collateral from father, Steve ([phone]): In the last year, father reports that patient's mental health has declined. He reports that patient has "fits", in which he appears frightened and is crying. States that patient has become increasingly "erratic". When asked to elaborate, he states that he "goes missing in the house" and "goes and sits in a dark room alone". Reports that he has become more reclusive. Also references an incident, occurring a year ago, in which the patient was "throwing all my things away". Denies patient has been agitated or aggressive towards others, but expresses safety concerns because "his sister is petrified".  About 1-1.5 months ago, he reports that patient disclosed to family that he wanted to "end it all". Father states that patient did not clearly specify whether this was in reference to the anxiety or self-harm. Reports that patient has never taken steps toward harming himself. States that patient has been maintaining ADLs independently. Expresses concerns about the patient showering for "2 hrs, up to 5 hours a day". Advocating for admission. 28 yo male, domiciled with father and sister, unemployed (last employed as a  ~1 year ago),history of anxiety, panic disorder, OCD, bipolar vs other affective disorder, ADHD, hx of stimulant induced psychotic disorder, two recent psychiatric hospitalizations including d/c from [facility] 6/17, referred for outpatient care at [facility] where he had initial appointment 6/20 and 6/23, multiple ED visits, s/p discharge from [facility] on 6/19 and 6/26, denies hx of SAs/NSSIB, BIB self, requesting voluntary psychiatric admission for management of anxiety.     On exam, patient is initially calm and cooperative, but becomes intermittently agitated, throughout the assessment, when challenged about requests for Ativan and use of the phone. Of note, patient perseverates on request for Ativan throughout interview. Upon approach, patient immediately asks this writer for "one time dose of Ativan" and states that he brought himself to the ED to possibly obtain a prescription for Ativan.     Patient subjectively reports chronic anxiety symptoms, as he states that he has been "in and out of the hospital a lot since February", although he provides vague description of these symptoms when asked to elaborate. Patient endorses severe anxiety attacks "due to my life circumstances". Patient reports experiencing a panic attack, earlier today, in which he states he was "crying, screaming, yelling". States that this panic attack prompted today's ED visit. Patient identifies multiple stressors including uncertainty about recent job interview and financial instability. He states that he had recent intake with [facility] for continuation of outpatient care. He states that he would like to "continue Prozac with them" because he was informed of their no controlled substance policy. When asked about reasons for recent request for discharge while voluntarily admitted to [facility], patient states that it was a "really bad visit, and I cannot go back to that hospital". Patient also states, "I had a prescription for Ativan" but I threw it away because I was at [facility] and they stopped it". When informed that patient cannot be assured that Ativan will prescribed on inpatient unit, if admitted, patient rescinds his request for voluntary hospitalization and becomes increasingly hostile. Patient initially states " I feel like I might be a harm to myself". When asked to elaborate, he states "just cutting" . Denies hx of or  recent NSSIB. Patient adamantly denies current SIIP/HIIP/AVH. Reports eating and sleeping appropriately (states that he has been sleeping ~8-12 hours/day).  No sxs of antonia or psychosis elicited. Denies substance use. Patient reports hx of Adderall use. States that he was last prescribed Adderall in October 2024 by his previous psychiatrist. He reports that this was discontinued due to suspected psychosis (states that he was throwing things around the house). Reports taking a dose of Adderall, which was given to him by his father.  before coming to the ED because "it helps with the anxiety and to stay focused". States that  Adderall was from his father's own prescribed supply. Reports that he was discharged from hospitalization at  in May 2025 on Prozac 20mg. Reports daily compliance with this patient, although he reports that "Ativan works better".     Addendum: Patient was determined to not meet threshold for involuntary hospitalization and declined voluntary hospitalization. He was then subsequently discharged from [facility]. Patient returned to the ED requesting  voluntary admission, upon the behest of his family. When challenged about information provided by collateral, patient denies making statement about wanting to "end it all". Also states that he repeatedly takes showers in the home (and does pushups--which was observed in the ED) when distressed/anxious.     Collateral from father, Steve ([phone]): In the last year, father reports that patient's mental health has declined. He reports that patient has "fits", in which he appears frightened and is crying. States that patient has become increasingly "erratic". When asked to elaborate, he states that he "goes missing in the house" and "goes and sits in a dark room alone". Reports that he has become more reclusive. Also references an incident, occurring a year ago, in which the patient was "throwing all my things away". Denies patient has been agitated or aggressive towards others, but expresses safety concerns because "his sister is petrified".  About 1-1.5 months ago, he reports that patient disclosed to family that he wanted to "end it all". Father states that patient did not clearly specify whether this was in reference to the anxiety or self-harm. Reports that patient has never taken steps toward harming himself. States that patient has been maintaining ADLs independently. Expresses concerns about the patient showering for "2 hrs, up to 5 hours a day". Advocating for admission.

## 2025-06-28 NOTE — ED BEHAVIORAL HEALTH NOTE - BEHAVIORAL HEALTH NOTE
PDI	Current Rx	Drug Type	Rx Written	Rx Dispensed	Drug	Quantity	Days Supply	Prescriber Name	Prescriber CARLY #	Payment Method	Dispenser  A	N	B	05/28/2025	05/28/2025	lorazepam 1 mg tablet	60	30	Marah Meng MD	[id number]	[facility]  B	N	S	09/23/2024	09/23/2024	dextroamp-amphetamin 20 mg tab	30	30	Annita Magaña	[id number]	Insurance	[facility] [id number]  B	N	S	07/30/2024	07/30/2024	dextroamp-amphet er 30 mg cap	30	30	Annita Magaña	[id number]	Insurance	[facility] [id number]  B	N	S	07/30/2024	07/30/2024	dextroamp-amphetamin 20 mg tab	30	30	Annita Magaña	[id number]	[facility] [id number] PDI	Current Rx	Drug Type	Rx Written	Rx Dispensed	Drug	Quantity	Days Supply	Prescriber Name	Prescriber CARLY #	Payment Method	Dispenser  A	N	B	05/28/2025	05/28/2025	lorazepam 1 mg tablet	60	30	Marah Meng MD	[id number]	[facility]  B	N	S	09/23/2024	09/23/2024	dextroamp-amphetamin 20 mg tab	30	30	Annita Magaña	[id number]	Insurance	[facility] [id number]  B	N	S	07/30/2024	07/30/2024	dextroamp-amphet er 30 mg cap	30	30	Annita Magaña	[id number]	Insurance	[facility] [id number]  B	N	S	07/30/2024	07/30/2024	dextroamp-amphetamin 20 mg tab	30	30	Annita Magaña	[id number]	[facility] [id number].

## 2025-06-28 NOTE — ED BEHAVIORAL HEALTH ASSESSMENT NOTE - VIOLENCE RISK FACTORS:
Affective dysregulation/Impulsivity/Noncompliance with treatment Affective dysregulation/Impulsivity

## 2025-06-28 NOTE — ED PROVIDER NOTE - OBJECTIVE STATEMENT
30 y/o M BIB ER secondary to  increase anxiousness and worsening depression. .  Admits to multiple social  stressors and inability to cope.  Denies SI/AH/VH/HI. Denies falling, punching or kicking any objects. Denies pain, SOB, fever, chills, chest/abdominal discomfort. Denies use of alcohol or   illicit drugs. No evidence of physical injuries, broken  skin or deformities.

## 2025-06-28 NOTE — ED BEHAVIORAL HEALTH ASSESSMENT NOTE - NSBHATTESTCOMMENTATTENDFT_PSY_A_CORE
30 yo male, domiciled with father and sister, unemployed (last employed as a  ~1 year ago),history of anxiety, panic disorder, OCD, bipolar vs other affective disorder, ADHD, hx of stimulant induced psychotic disorder, two recent psychiatric hospitalizations including d/c from [facility] 6/17, referred for outpatient care at [facility] where he had initial appointment 6/20 and 6/23, multiple ED visits, s/p discharge from [facility] on 6/19 and 6/26, denies hx of SAs/NSSIB, BIB self, requesting voluntary psychiatric admission for management of anxiety.   On exam, patient noted anxious, hyper-focused on needing Ativan or Valium to manage his anxiety, He reported that anxiety is interfeering with his ability to function, he reported taking Adderall in the past and this was "calming", he said that he has not been prescribed Adderall since last October due to "incident at home". He was initially asking to be admitted, but when he was not given Ativan that he was demanding he asked to be discharged, he adamantly denied any active/passive suicidal/homicidal ideation, plan or intent. After discharging from the ED he returned requesting voluntary hospitalization, this was supported by his family due to his erratic behaviors and inability to care for self in the community.

## 2025-06-28 NOTE — ED BEHAVIORAL HEALTH ASSESSMENT NOTE - DESCRIPTION
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 Patient was offered PRN Atarax, which he declined. Did not require use of emergent IM medication or use of 4 point restraints.      ICU Vital Signs Last 24 Hrs  T(C): 36.7 (28 Jun 2025 18:11), Max: 36.7 (28 Jun 2025 18:11)  T(F): 98.1 (28 Jun 2025 18:11), Max: 98.1 (28 Jun 2025 18:11)  HR: 61 (28 Jun 2025 18:11) (61 - 61)  BP: 118/76 (28 Jun 2025 18:11) (118/76 - 118/76)  BP(mean): --  ABP: --  ABP(mean): --  RR: 16 (28 Jun 2025 18:11) (16 - 16)  SpO2: 98% (28 Jun 2025 18:11) (98% - 98%)    O2 Parameters below as of 28 Jun 2025 18:11  Patient On (Oxygen Delivery Method): room air

## 2025-06-28 NOTE — ED BEHAVIORAL HEALTH ASSESSMENT NOTE - NSBHMSEMOVE_PSY_A_CORE
Patient presents to consult with Dr. Puga for elevated LFT's, ETOH use. Referred by Dr. Delvis Zaragoza, PCP. Patient offers no complaints. He reports a history of heavy ETOH use - 2-3 1.75L bottles of liquor per week as well as beer. States he has decreased ETOH use to 2-3 beers and maybe 1 drink of liquor per week. Medications and allergies reviewed via [facility]. Assessment deferred to MD. Per Dr. Puga - needs labs today, fibroscan, percutaneous liver biopsy, stop all NSAID's 10 days prior to biopsy, discuss biopsy at pathology conference when complete, follow up in 1month. Plan reviewed with patient who verbalized understanding. Orders entered.   No abnormal movements

## 2025-06-28 NOTE — ED BEHAVIORAL HEALTH ASSESSMENT NOTE - NSBHSAAMPHET_PSY_A_CORE FT
pt reports hx of dependence after 8 years prescribed Adderall; denies use in years per chart review from  Assessment Note on 6/26/25, pt reports hx of dependence after 8 years prescribed Adderall; last use was in October 2024

## 2025-06-28 NOTE — ED BEHAVIORAL HEALTH ASSESSMENT NOTE - DETAILS
denies SI, committed to seeking help in hospital if ever in crisis Pt reports a dystonic reaction w/ Vraylar & dependence w/ Adderall after hours and detailed documentation from recent ED visits available denies self referred  5/21-5/28/2025 then [facility] 6/14-6/17/2025 Deferred Self-referred JANAY made aware

## 2025-06-28 NOTE — ED ADULT NURSE NOTE - OBJECTIVE STATEMENT
pt states im having anxiety and panic attacks/ pt takes prozac and took 1 atarol  which help to calm him  he states denies  HI  had thought of hurting  this past week pt did not act upon thought s  father   Patient brought to  area for above complaints. Patient is alert and oriented times three. Patient evaluated by medical provider and states he is anxious and needs an Ativan. Received an  Adderall from his dad before he came. Labs drawn and sent. EKG done. Patient waiting for psych evaluation.   HERNANDEZ Padgett

## 2025-06-29 DIAGNOSIS — F42.9 OBSESSIVE-COMPULSIVE DISORDER, UNSPECIFIED: ICD-10-CM

## 2025-06-29 PROCEDURE — 99222 1ST HOSP IP/OBS MODERATE 55: CPT

## 2025-06-29 RX ADMIN — HYDROXYZINE HYDROCHLORIDE 25 MILLIGRAM(S): 25 TABLET, FILM COATED ORAL at 07:38

## 2025-06-29 RX ADMIN — FLUOXETINE HYDROCHLORIDE 20 MILLIGRAM(S): 20 CAPSULE ORAL at 08:41

## 2025-06-29 NOTE — BH INPATIENT PSYCHIATRY ASSESSMENT NOTE - CURRENT MEDICATION
MEDICATIONS  (STANDING):  FLUoxetine 20 milliGRAM(s) Oral daily    MEDICATIONS  (PRN):  hydrOXYzine hydrochloride 25 milliGRAM(s) Oral two times a day PRN Anxiety in the setting of acute psychiatric illness  OLANZapine Injectable 5 milliGRAM(s) IntraMuscular once PRN Severe agitation secondary to psychosis, antonia, or poor impulse control.

## 2025-06-29 NOTE — BH INPATIENT PSYCHIATRY ASSESSMENT NOTE - NSSUICPROTFACT_PSY_ALL_CORE
Identifies reasons for living/Supportive social network of family or friends/Fear of death or the actual act of killing self/Positive therapeutic relationships/Other

## 2025-06-29 NOTE — BH INPATIENT PSYCHIATRY ASSESSMENT NOTE - NSBHSAAMPHET_PSY_A_CORE FT
per chart review from  Assessment Note on 6/26/25, pt reports hx of dependence after 8 years prescribed Adderall; last use was in October 2024

## 2025-06-29 NOTE — BH INPATIENT PSYCHIATRY ASSESSMENT NOTE - HPI (INCLUDE ILLNESS QUALITY, SEVERITY, DURATION, TIMING, CONTEXT, MODIFYING FACTORS, ASSOCIATED SIGNS AND SYMPTOMS)
Patient was seen and evaluated, chart, medications and labs reviewed. Case discussed with nursing team.  On service for this 29 year old single male, has no dependents.  Patient domiciles in private residence with family (sister and father), currently unemployed ((last employed as a  ~1 year ago).  Patient has PPH Anxiety Disorder, panic disorder, OCD, bipolar vs other affective disorder, ADHD. Patient has hx of stimulant induced psychotic disorder.  Patient has 2 prior inpatient hospitalizations. Most recently at [facility] , referred for outpatient care at [facility] where he had initial appointment  and , multiple ED visits, s/p discharge from [facility] on  and .  BIB self, requesting voluntary psychiatric admission for management of anxiety.  Patient is re-admitted with a primary problem of emotional decompensation, with anxiety.  Patient admitted to [facility] on a . legal status. I have reviewed the initial psychiatric assessment in the electronic medical record, including the history of present illness, past psychiatric history, family/social history (no pertinent changes), and exam, and have confirmed the salient findings dated 25.  As per chart review, transferring records indicated the followin yo male, domiciled with father and sister, unemployed (last employed as a  ~1 year ago),history of anxiety, panic disorder, OCD, bipolar vs other affective disorder, ADHD, hx of stimulant induced psychotic disorder, two recent psychiatric hospitalizations including d/c from [facility] , referred for outpatient care at [facility] where he had initial appointment  and , multiple ED visits, s/p discharge from [facility] on  and , denies hx of SAs/NSSIB, BIB self, requesting voluntary psychiatric admission for management of anxiety.  On exam, patient is initially calm and cooperative, but becomes intermittently agitated, throughout the assessment, when challenged about requests for Ativan and use of the phone. Of note, patient perseverates on request for Ativan throughout interview. Upon approach, patient immediately asks this writer for "one time dose of Ativan" and states that he brought himself to the ED to possibly obtain a prescription for Ativan.  Patient subjectively reports chronic anxiety symptoms, as he states that he has been "in and out of the hospital a lot since February", although he provides vague description of these symptoms when asked to elaborate. Patient endorses severe anxiety attacks "due to my life circumstances". Patient reports experiencing a panic attack, earlier today, in which he states he was "crying, screaming, yelling". States that this panic attack prompted today's ED visit. Patient identifies multiple stressors including uncertainty about recent job interview and financial instability. He states that he had recent intake with [facility] for continuation of outpatient care. He states that he would like to "continue Prozac with them" because he was informed of their no controlled substance policy. When asked about reasons for recent request for discharge while voluntarily admitted to [facility], patient states that he "really bad visit, and I cannot go back to that hospital". Patient also states, "I had a prescription for Ativan" but I threw it away because I was at [facility] and they stopped it". When informed that patient cannot be assured that Ativan will prescribed on inpatient unit, if admitted, patient rescinds his request for voluntary hospitalization and becomes increasingly hostile. Patient initially states " I feel like I might be a harm to myself". When asked to elaborate, he states "just cutting" . Denies hx of or  recent NSSIB. Patient adamantly denies current SIIP/HIIP/AVH. Reports eating and sleeping appropriately (states that he has been sleeping ~8-12 hours/day).  No sxs of antonia or psychosis elicited. Denies substance use. Patient reports hx of Adderall use. States that he was last prescribed Adderall in 2024 by his previous psychiatrist. He reports that this was discontinued due to suspected psychosis (states that he was throwing things around the house). Reports taking a dose of Adderall, which was given to him by his father.  before coming to the ED because "it helps with the anxiety and to stay focused". States that  Adderall was from his father's own prescribed supply. Reports that he was discharged from hospitalization at  in May 2025 on Prozac 20mg. Reports daily compliance with this patient, although he reports that "Ativan works better".   Addendum: Patient was determined not meet threshold for involuntary hospitalization and declined voluntary hospitalization. He was then subsequently discharged from [facility]. Patient returned to the ED requesting  voluntary admission, upon the behest of his family. When challenged about information provided by collateral, patient denies making statement about wanting to "end it all". Also states that he repeatedly takes showers in the home (and does pushups--which was observed in the ED) when distressed/anxious.   Collateral from father, Steve ([phone]): In the last year, father reports that patient's mental health has declined. He reports that patient has "fits", in which he appears frightened and is crying. States that patient has become increasingly "erratic". When asked to elaborate, he states that he "goes missing in the house" and "goes and sits in a dark room alone". Reports that he has become more reclusive. Also references an incident, occurring a year ago, in which the patient was "throwing all my things away". Denies patient has been agitated or aggressive towards others, but expresses safety concerns because "his sister is petrified".  About 1-1.5 months ago, he reports that patient disclosed to family that he wanted to "end it all". Father states that patient did not clearly specify whether this was in reference to the anxiety or self-harm. Reports that patient has never taken steps toward harming himself. States that patient has been maintaining ADLs independently. Expresses concerns about the patient showering for "2 hrs, up to 5 hours a day". Advocating for admission.    On unit: information came from chart review and patient interview Patient was seen and evaluated, chart, medications and labs reviewed. Case discussed with nursing team.  On service for this 29 year old single male, has no dependents.  Patient domiciles in private residence with family (sister and father), currently unemployed ((last employed as a  ~1 year ago).  Patient has PPH Anxiety Disorder, panic disorder, OCD, bipolar vs other affective disorder, ADHD. Patient has hx of stimulant induced psychotic disorder.  Patient has 2 prior inpatient hospitalizations. Most recently at [facility] , referred for outpatient care at [facility] where he had initial appointment  and , multiple ED visits, s/p discharge from [facility] on  and .  BIB self, requesting voluntary psychiatric admission for management of anxiety.  Patient is re-admitted with a primary problem of emotional decompensation, with anxiety.  Patient admitted to [facility] on a . legal status. I have reviewed the initial psychiatric assessment in the electronic medical record, including the history of present illness, past psychiatric history, family/social history (no pertinent changes), and exam, and have confirmed the salient findings dated 25.  As per chart review, transferring records indicated the followin yo male, domiciled with father and sister, unemployed (last employed as a  ~1 year ago),history of anxiety, panic disorder, OCD, bipolar vs other affective disorder, ADHD, hx of stimulant induced psychotic disorder, two recent psychiatric hospitalizations including d/c from [facility] , referred for outpatient care at [facility] where he had initial appointment  and , multiple ED visits, s/p discharge from [facility] on  and , denies hx of SAs/NSSIB, BIB self, requesting voluntary psychiatric admission for management of anxiety.  On exam, patient is initially calm and cooperative, but becomes intermittently agitated, throughout the assessment, when challenged about requests for Ativan and use of the phone. Of note, patient perseverates on request for Ativan throughout interview. Upon approach, patient immediately asks this writer for "one time dose of Ativan" and states that he brought himself to the ED to possibly obtain a prescription for Ativan.  Patient subjectively reports chronic anxiety symptoms, as he states that he has been "in and out of the hospital a lot since February", although he provides vague description of these symptoms when asked to elaborate. Patient endorses severe anxiety attacks "due to my life circumstances". Patient reports experiencing a panic attack, earlier today, in which he states he was "crying, screaming, yelling". States that this panic attack prompted today's ED visit. Patient identifies multiple stressors including uncertainty about recent job interview and financial instability. He states that he had recent intake with [facility] for continuation of outpatient care. He states that he would like to "continue Prozac with them" because he was informed of their no controlled substance policy. When asked about reasons for recent request for discharge while voluntarily admitted to [facility], patient states that he "really bad visit, and I cannot go back to that hospital". Patient also states, "I had a prescription for Ativan" but I threw it away because I was at [facility] and they stopped it". When informed that patient cannot be assured that Ativan will prescribed on inpatient unit, if admitted, patient rescinds his request for voluntary hospitalization and becomes increasingly hostile. Patient initially states " I feel like I might be a harm to myself". When asked to elaborate, he states "just cutting" . Denies hx of or  recent NSSIB. Patient adamantly denies current SIIP/HIIP/AVH. Reports eating and sleeping appropriately (states that he has been sleeping ~8-12 hours/day).  No sxs of antonia or psychosis elicited. Denies substance use. Patient reports hx of Adderall use. States that he was last prescribed Adderall in 2024 by his previous psychiatrist. He reports that this was discontinued due to suspected psychosis (states that he was throwing things around the house). Reports taking a dose of Adderall, which was given to him by his father.  before coming to the ED because "it helps with the anxiety and to stay focused". States that  Adderall was from his father's own prescribed supply. Reports that he was discharged from hospitalization at  in May 2025 on Prozac 20mg. Reports daily compliance with this patient, although he reports that "Ativan works better".   Addendum: Patient was determined not meet threshold for involuntary hospitalization and declined voluntary hospitalization. He was then subsequently discharged from [facility]. Patient returned to the ED requesting  voluntary admission, upon the behest of his family. When challenged about information provided by collateral, patient denies making statement about wanting to "end it all". Also states that he repeatedly takes showers in the home (and does pushups--which was observed in the ED) when distressed/anxious.   Collateral from father, Steve ([phone]): In the last year, father reports that patient's mental health has declined. He reports that patient has "fits", in which he appears frightened and is crying. States that patient has become increasingly "erratic". When asked to elaborate, he states that he "goes missing in the house" and "goes and sits in a dark room alone". Reports that he has become more reclusive. Also references an incident, occurring a year ago, in which the patient was "throwing all my things away". Denies patient has been agitated or aggressive towards others, but expresses safety concerns because "his sister is petrified".  About 1-1.5 months ago, he reports that patient disclosed to family that he wanted to "end it all". Father states that patient did not clearly specify whether this was in reference to the anxiety or self-harm. Reports that patient has never taken steps toward harming himself. States that patient has been maintaining ADLs independently. Expresses concerns about the patient showering for "2 hrs, up to 5 hours a day". Advocating for admission.    On unit: information came from chart review and patient interview:  In today’s interview patient reported that his mood to be “anxious”  he reports feeling very anxious and requested Ativan standing.   Patient submitted a 3DL this morning at 0913, stating he wants to be discharged in the morning, when asked about this pt replied “the only way I’ll stay here is if I get Ativan prescribed”   Patient endorses depressive symptoms due symptoms of constant anxiety and COD. Patient presents irritable, and in edge during interview.    Patient denies any current AVH, delusions, psychotic disorganization, mind reading abilities, thought insertion, ideas of reference, special monsalve, or thought broadcasting or paranoia. Denies history of aggression or violence. No access to firearm. Patient denies any symptoms suggestive of active antonia: (denied grandiosity/ racing thoughts/ increased goal directed activities or engaged in risk taking behavior/ no pressured speech/ no elevated mood/ denied any increased in energy level causing sleep disruption), no signs of catatonia (with no signs of mutism, negativism, stereotypy, echolalia, echopraxia, posturing or rigidity. He reports obsessive, intrusive and persistent thoughts and compulsive, ritualistic acts are reported. 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 Denies past trauma.   No PMH.

## 2025-06-29 NOTE — BH PATIENT PROFILE - NSVRISKFUTURESTRESS_PSY_ALL_CORE
Attempted to PAT patient for procedure on 4.27. with Dr. alvarenga. No answer. LVM with return phone number. No return call at this time.   
Spoke with patient regarding procedure scheduled on 4.27    Arrival time 1320    Has patient been sick with fever or on antibiotics within the last 7 days? No    Does the patient have any open wounds, sores or rashes? No    Does the patient have any recent fractures? no    Has patient received a vaccination within the last 7 days? No    Received the COVID vaccination? yes    Has the patient stopped all medications as directed? Na    Does patient have a pacemaker and or defibrillator? no    Does the patient have a ride to and from procedure and someone reliable to remain with patient?  babita    Is the patient diabetic? no    Does the patient have sleep apnea? Or use O2 at home? No and no     Is the patient receiving sedation? yes    Is the patient instructed to remain NPO beginning at midnight the night before their procedure? yes    Procedure location confirmed with patient? Yes    Covid- Denies signs/symptoms. Instructed to notify PAT/MD if any changes.    
Do not know

## 2025-06-29 NOTE — BH PATIENT PROFILE - PATIENT'S PREFERRED PRONOUN
Fady Mckeon MD  Interventional Cardiology / Advance Heart Failure and Cardiac Transplant Specialist  [place] Office : [phone] [place] [address] [place] [address]  Tel:   [place] Office : [address] [facility] [place] [address]  Tel: [phone]  Cell : [phone]      HISTORY OF PRESENTING ILLNESS:  HPI:  56yo M hx of HTN, diabetes presented with cc of left foot infection 1 week. Patient reported continue to ambulate without significant pain. Swelling is worsening with redness/warmth around the area. Patient was seen by PCP earlier today at a routine follow up and was advised to come to ED for further eval. Patient otherwise reported baseline functional status and able to ambulate two flight of stairs without sob or cp. Patient was noted hyperglycemic in the ED 600s and hypertensive 180s systolic. Given regular insulin 6u.    PAST MEDICAL & SURGICAL HISTORY:  Diabetes    No significant past surgical history        SOCIAL HISTORY: Substance Use (street drugs): ( x ) never used  (  ) other:    FAMILY HISTORY:  No pertinent family history in first degree relatives        MEDICATIONS:  amLODIPine   Tablet 5 milliGRAM(s) Oral daily  hydrALAZINE 10 milliGRAM(s) Oral three times a day    piperacillin/tazobactam IVPB.. 3.375 Gram(s) IV Intermittent every 8 hours      acetaminophen   Tablet .. 650 milliGRAM(s) Oral every 6 hours PRN  HYDROmorphone  Injectable 0.5 milliGRAM(s) IV Push every 4 hours PRN  oxycodone    5 mG/acetaminophen 325 mG 1 Tablet(s) Oral every 4 hours PRN      dextrose 40% Gel 15 Gram(s) Oral once  dextrose 50% Injectable 25 Gram(s) IV Push once  dextrose 50% Injectable 12.5 Gram(s) IV Push once  dextrose 50% Injectable 25 Gram(s) IV Push once  glucagon  Injectable 1 milliGRAM(s) IntraMuscular once  insulin glargine Injectable (LANTUS) 18 Unit(s) SubCutaneous at bedtime  insulin lispro (ADMELOG) corrective regimen sliding scale   SubCutaneous three times a day before meals  insulin lispro (ADMELOG) corrective regimen sliding scale   SubCutaneous at bedtime  insulin lispro Injectable (ADMELOG) 4 Unit(s) SubCutaneous three times a day before meals    dextrose 5%. 1000 milliLiter(s) IV Continuous <Continuous>  dextrose 5%. 1000 milliLiter(s) IV Continuous <Continuous>      FAMILY HISTORY:  No pertinent family history in first degree relatives          Allergies    No Known Drug Allergies  shellfish (Angioedema)    Intolerances    	      PHYSICAL EXAM:  T(C): 36.4 (03-14-21 @ 14:53), Max: 37.1 (03-14-21 @ 10:25)  HR: 72 (03-14-21 @ 14:53) (68 - 82)  BP: 150/71 (03-14-21 @ 14:53) (127/63 - 160/80)  RR: 16 (03-14-21 @ 14:53) (13 - 17)  SpO2: 95% (03-14-21 @ 14:53) (94% - 100%)  Wt(kg): --  I&O's Summary    13 Mar 2021 06:01  -  14 Mar 2021 07:00  --------------------------------------------------------  IN: 0 mL / OUT: 1200 mL / NET: -1200 mL    14 Mar 2021 07:01  -  14 Mar 2021 21:16  --------------------------------------------------------  IN: 390 mL / OUT: 0 mL / NET: 390 mL          EYES: EOMI, PERRLA, conjunctiva and sclera clear  ENMT: No tonsillar erythema, exudates, or enlargement; Moist mucous membranes, Good dentition, No lesions  Cardiovascular: Normal S1 S2, No JVD, No murmurs, No edema  Respiratory: Lungs clear to auscultation	  Gastrointestinal:  Soft, Non-tender, + BS	  Extremities: left foot covered      LABS:	 	    CARDIAC MARKERS:                                  7.7    13.59 )-----------( 249      ( 14 Mar 2021 09:36 )             22.8     03-14    134<L>  |  99  |  19  ----------------------------<  211<H>  4.1   |  25  |  1.45<H>    Ca    8.7      14 Mar 2021 07:35  Phos  3.3     03-13  Mg     1.7     03-14    TPro  7.5  /  Alb  2.1<L>  /  TBili  0.4  /  DBili  x   /  AST  15  /  ALT  14  /  AlkPhos  148<H>  03-14    proBNP:   Lipid Profile:   HgA1c:   TSH:     Consultant(s) Notes Reviewed:  [x ] YES  [ ] NO    Care Discussed with Consultants/Other Providers [ x] YES  [ ] NO    Imaging Personally Reviewed independently:  [x] YES  [ ] NO    All labs, radiologic studies, vitals, orders and medications list reviewed. Patient is seen and examined at bedside. Case discussed with medical team.    ASSESSMENT/PLAN: 	     Him/He

## 2025-06-29 NOTE — BH INPATIENT PSYCHIATRY ASSESSMENT NOTE - ATTENDING COMMENTS
28 yo M, dx of anxiety, bipolar, OCD, prior hospitalizations, currently in outpatient treatment, admitted 9.13 due to worsening anxiety. per notes, patient seemingly preoccupied with wanting Lorazepam. Collateral obtained in ED indicates there may be some safety related concerns his family had. Patient states he is in the hospital due to recurring panic attacks, that he feels fine now, and he inquires about his benzodiazepine script and if we were giving it to him. He currently denies depressive, psychotic or manic symptoms. Patient presenting with mood and anxiety symptoms for which hospitalization will be helpful. Hospitalization will aid with assurance of safety, stabilization of symptoms, optimization of medications, coordination of outpatient services. agree with assessment and plan as above.

## 2025-06-29 NOTE — BH INPATIENT PSYCHIATRY ASSESSMENT NOTE - NSDCCRITERIA_PSY_ALL_CORE
Symptom Stabilization  Optimize medications  CGI<=3  Outpatient services f/u  Possible DBT/CBT as outpatient  Consider referral to outpt dual diagnosis program

## 2025-06-29 NOTE — BH INPATIENT PSYCHIATRY ASSESSMENT NOTE - NSBHMETABOLIC_PSY_ALL_CORE_FT
BMI: BMI (kg/m2): 19.9 (06-26-25 @ 14:10)  HbA1c: A1C with Estimated Average Glucose Result: 5.2 % (05-21-25 @ 11:10)    Glucose:   BP: 108/68 (06-29-25 @ 00:11) (108/68 - 118/76)Vital Signs Last 24 Hrs  T(C): 36.8 (06-29-25 @ 00:11), Max: 36.8 (06-29-25 @ 00:11)  T(F): 98.3 (06-29-25 @ 00:11), Max: 98.3 (06-29-25 @ 00:11)  HR: 65 (06-29-25 @ 00:11) (61 - 65)  BP: 108/68 (06-29-25 @ 00:11) (108/68 - 118/76)  BP(mean): --  RR: 16 (06-29-25 @ 00:11) (16 - 16)  SpO2: 96% (06-29-25 @ 00:11) (96% - 98%)    Orthostatic VS  06-29-25 @ 00:40  Lying BP: --/-- HR: --  Sitting BP: 118/73 HR: 72  Standing BP: 105/74 HR: 77  Site: --  Mode: --    Lipid Panel: Date/Time: 05-22-25 @ 09:26  Cholesterol, Serum: 195  LDL Cholesterol Calculated: 63  HDL Cholesterol, Serum: 119  Total Cholesterol/HDL Ration Measurement: --  Triglycerides, Serum: 67

## 2025-06-29 NOTE — PSYCHIATRIC REHAB INITIAL EVALUATION - NSBHPRRECOMMEND_PSY_ALL_CORE
Writer met with patient in order to orient patient to the unit, introduce patient to Psychiatric Rehabilitation Staff, department functions and to establish a collaborative Psychiatric Rehabilitation goal. Patient was verbal, polite and cooperative throughout the duration of the interview. Patient was able to be fully oriented to group programming and was encouraged to attend. Questions and concerns surrounding treatment and Psychiatric Rehabilitation services were addressed. Patient was admitted to [place] [address] on 6/28/25 in the context of increased anxiety. Patient's insight into symptoms is fair. Patient was able to develop a rapport with writer. Patient was able to establish a collaborative Psychiatric Rehabilitation goal. Psychiatric Rehabilitation Staff will continue to engage patient daily in order to develop rapport, provide support and to assist patient in demonstrating progress towards Psychiatric Rehabilitation goals.

## 2025-06-29 NOTE — BH INPATIENT PSYCHIATRY ASSESSMENT NOTE - NSBHSACONSEQUENCE_PSY_A_CORE FT
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

## 2025-06-29 NOTE — BH INPATIENT PSYCHIATRY ASSESSMENT NOTE - PAST PSYCHOTROPIC MEDICATION
Per chart review: Ativan, Prozac (x1 year in the past), Lexapro (x 1 month following 2/2025  IPP), Adderall x8 years (dependence), Buspar & Vraylar

## 2025-06-29 NOTE — BH INPATIENT PSYCHIATRY ASSESSMENT NOTE - DESCRIPTION
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

## 2025-06-29 NOTE — BH PATIENT PROFILE - STATED REASON FOR ADMISSION
Patient stated they have been having worsening anxiety at home and increased panic attacks. Patient states dad wanted him to come in because he has been yelling at home, but nothing directed towards his family. Patient denies AVH. Patient SI/HI.

## 2025-06-29 NOTE — BH INPATIENT PSYCHIATRY ASSESSMENT NOTE - NSBHCHARTREVIEWVS_PSY_A_CORE FT
Vital Signs Last 24 Hrs  T(C): 36.8 (06-29-25 @ 00:11), Max: 36.8 (06-29-25 @ 00:11)  T(F): 98.3 (06-29-25 @ 00:11), Max: 98.3 (06-29-25 @ 00:11)  HR: 65 (06-29-25 @ 00:11) (61 - 65)  BP: 108/68 (06-29-25 @ 00:11) (108/68 - 118/76)  BP(mean): --  RR: 16 (06-29-25 @ 00:11) (16 - 16)  SpO2: 96% (06-29-25 @ 00:11) (96% - 98%)    Orthostatic VS  06-29-25 @ 00:40  Lying BP: --/-- HR: --  Sitting BP: 118/73 HR: 72  Standing BP: 105/74 HR: 77  Site: --  Mode: --

## 2025-06-29 NOTE — BH INPATIENT PSYCHIATRY ASSESSMENT NOTE - NSBHASSESSSUMMFT_PSY_ALL_CORE
Patient is a 29 year old single male, has no dependents.  Patient domiciles in private residence with family (sister and father), currently unemployed ((last employed as a  ~1 year ago).  Patient has PPH Anxiety Disorder, panic disorder, OCD, bipolar vs other affective disorder, ADHD. Patient has hx of stimulant induced psychotic disorder.  Patient has 2 prior inpatient hospitalizations. Most recently at [facility] 6/17, referred for outpatient care at [facility] where he had initial appointment 6/20 and 6/23, multiple ED visits, s/p discharge from [facility] on 6/19 and 6/26.  BIB self, requesting voluntary psychiatric admission for management of anxiety.  Patient is re-admitted with a primary problem of emotional decompensation, with anxiety.  Patient admitted to [facility] on a 9.13 legal status.     PLAN:   1. Admit on 9.13 legal status  2. Safety: no need for CO 1:1, no current SIIP/HIIP  3. Psychiatric:  - c/w home medication of Prozac 20mg daily for now. Defer to primary team for further medication management.   -----> PLEASE AVOID USE OF BENZODIAZEPINES FOR ANXIETY    PRNs: Please attempt verbal de-escalation and behavioral intervention first  Agitation: Zyprexa 5mg PO/IM q6h prn  Anxiety: Atarax 25mg BID   *Please avoid use of antipsychotics if QTC>500  4. Medical:  - no known significant PMHx, Admission labs/CT WNL  5. Individual/group/milleu therapy, as indicated  6. Dispo: pending clinical improvement

## 2025-06-30 PROCEDURE — 99233 SBSQ HOSP IP/OBS HIGH 50: CPT

## 2025-06-30 RX ORDER — QUETIAPINE FUMARATE 25 MG/1
50 TABLET ORAL EVERY 6 HOURS
Refills: 0 | Status: DISCONTINUED | OUTPATIENT
Start: 2025-06-30 | End: 2025-07-02

## 2025-06-30 RX ORDER — HYDROXYZINE HYDROCHLORIDE 25 MG/1
50 TABLET, FILM COATED ORAL EVERY 6 HOURS
Refills: 0 | Status: DISCONTINUED | OUTPATIENT
Start: 2025-06-30 | End: 2025-07-02

## 2025-06-30 RX ADMIN — FLUOXETINE HYDROCHLORIDE 20 MILLIGRAM(S): 20 CAPSULE ORAL at 08:36

## 2025-06-30 NOTE — BH INPATIENT PSYCHIATRY PROGRESS NOTE - NSBHASSESSSUMMFT_PSY_ALL_CORE
Patient is a 29 year old single male, has no dependents.  Patient domiciles in private residence with family (sister and father), currently unemployed ((last employed as a  ~1 year ago).  Patient has PPH Anxiety Disorder, panic disorder, OCD, bipolar vs other affective disorder, ADHD. Patient has hx of stimulant induced psychotic disorder.  Patient has 2 prior inpatient hospitalizations. Most recently at [facility] 6/17, referred for outpatient care at [facility] where he had initial appointment 6/20 and 6/23, multiple ED visits, s/p discharge from [facility] on 6/19 and 6/26.  BIB self, requesting voluntary psychiatric admission for management of anxiety.  Patient is re-admitted with a primary problem of emotional decompensation, with anxiety.  Patient admitted to [facility] on a 9.13 legal status.     PLAN:   1. Admit on 9.13 legal status  2. Safety: no need for CO 1:1, no current SIIP/HIIP  3. Psychiatric:  - c/w home medication of Prozac 20mg daily for now. Defer to primary team for further medication management.   -----> PLEASE AVOID USE OF BENZODIAZEPINES FOR ANXIETY    PRNs: Please attempt verbal de-escalation and behavioral intervention first  Agitation: Zyprexa 5mg PO/IM q6h prn  Anxiety: Atarax 25mg BID   *Please avoid use of antipsychotics if QTC>500  4. Medical:  - no known significant PMHx, Admission labs/CT WNL  5. Individual/group/milleu therapy, as indicated  6. Dispo: pending clinical improvement       29 year old unemployed  with multiple inpatient admissions,  carries multiple diagnoses and talks about his problems in terms of anxiety and "OCD".  He is here on a 913 legal status.  He is rather anxious and uncomfortable appearing and was seeking ativan and submitted a 72 hour letter when told that ativan not available to him.  Diagnostically he may be suffering from substance induced psychosis (utox+amphetamine) or perhaps some sort of anxiety or OCD.  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  Although I observe no jo psychotic symptoms, his anxiety may be in response to something of psychotic proportions, although this is speculation on my part.  He submitted a 72 hour letter in the am of 6/29.  I discussed increased dose of prozac and or standing quetiapine for him but he declines.  Ativan is not appropriate as he is likely already too fond of it and it seems to be presenting a possibility for dependence with him.  Provided educaiton about quetiapine and hydroxyzine.      1. Continue 913 hospitalization for safety and assessment  2 haldol and atarax prns, discussed and making quetiapine 50mg po q six hours prn available to him  3. continue to offer prozac 20mg daily.  4. he substmitte a 72 hour lette which is due 7/2.  He merits continued assessment for diagnsosis and safety especially given his uncomfortable apeparance.        Patient is a 29 year old single male, has no dependents.  Patient domiciles in private residence with family (sister and father), currently unemployed ((last employed as a  ~1 year ago).  Patient has PPH Anxiety Disorder, panic disorder, OCD, bipolar vs other affective disorder, ADHD. Patient has hx of stimulant induced psychotic disorder.  Patient has 2 prior inpatient hospitalizations. Most recently at [facility] 6/17, referred for outpatient care at [facility] where he had initial appointment 6/20 and 6/23, multiple ED visits, s/p discharge from [facility] on 6/19 and 6/26.  BIB self, requesting voluntary psychiatric admission for management of anxiety.  Patient is re-admitted with a primary problem of emotional decompensation, with anxiety.  Patient admitted to [facility] on a 9.13 legal status.     PLAN:   1. Admit on 9.13 legal status  2. Safety: no need for CO 1:1, no current SIIP/HIIP  3. Psychiatric:  - c/w home medication of Prozac 20mg daily for now. Defer to primary team for further medication management.   -----> PLEASE AVOID USE OF BENZODIAZEPINES FOR ANXIETY    PRNs: Please attempt verbal de-escalation and behavioral intervention first  Agitation: Zyprexa 5mg PO/IM q6h prn  Anxiety: Atarax 25mg BID   *Please avoid use of antipsychotics if QTC>500  4. Medical:  - no known significant PMHx, Admission labs/CT WNL  5. Individual/group/milleu therapy, as indicated  6. Dispo: pending clinical improvement

## 2025-06-30 NOTE — BH INPATIENT PSYCHIATRY PROGRESS NOTE - OTHER
initially anxiously perseverative; becomes increasingly hostile when denied Ativan limited/under assessment	 limited/under assessment "better" anxiously perseverative seems less irritable to me, uncomfortable anxiety

## 2025-06-30 NOTE — BH INPATIENT PSYCHIATRY PROGRESS NOTE - NSBHTIMEACTIVITIESPERFORMED_PSY_A_CORE
patient seen, peterrt reviewed, case discused with team, met with patient on multiple occasions, discussed case with team, discussed case with father, gathered collateral, reviewed multiple discahrge sumamries from [facility], documentation, hal.

## 2025-06-30 NOTE — BH INPATIENT PSYCHIATRY PROGRESS NOTE - NSBHATTESTBILLING_PSY_A_CORE
99222-Initial OBS or IP - moderate complexity OR 55-74 mins 67650-Effmldvwcu OBS or IP - high complexity OR 50-79 mins

## 2025-06-30 NOTE — ED BEHAVIORAL HEALTH NOTE - BEHAVIORAL HEALTH NOTE
I called [employer] ([phone]) and spoke to Annmarie CORNEJO who provided auth #[id number]. writer faxed clinicals with UR information [phone]

## 2025-06-30 NOTE — BH SOCIAL WORK INITIAL PSYCHOSOCIAL EVALUATION - OTHER PAST PSYCHIATRIC HISTORY (INCLUDE DETAILS REGARDING ONSET, COURSE OF ILLNESS, INPATIENT/OUTPATIENT TREATMENT)
Pt is a 29 yr old unemployed , with a history of several recent brief hospitalizations, who has been unable to maintain a job. He has been very anxious with panic attacks and screams out loud at home and hits himself due to his anxiety.  Pt is a 29 yr old unemployed , with a history of several recent brief hospitalizations, who has been unable to maintain a job. He has been very anxious with panic attacks and screams out loud at home and hits himself due to his anxiety. Pt was in [facility] in February, then moved to [place] with his mother briefly where he worked at a [employer] for 3 days then quit. He was also hospitalized while in [place]. Pt was at [facility] for 4 days at the end of May 2025 and was also in [facility] since then. Pt came to the hospital voluntarily but then submitted a 3DL shortly after arriving. Pt states he wants to go [facility] with his father for the July 4th holiday and is looking forward to celebrating his birthday in 1 week.

## 2025-07-01 PROCEDURE — 99232 SBSQ HOSP IP/OBS MODERATE 35: CPT

## 2025-07-01 RX ORDER — FLUOXETINE HYDROCHLORIDE 20 MG/1
1 CAPSULE ORAL
Qty: 30 | Refills: 0
Start: 2025-07-01 | End: 2025-07-30

## 2025-07-01 RX ORDER — QUETIAPINE FUMARATE 25 MG/1
1 TABLET ORAL
Qty: 60 | Refills: 0
Start: 2025-07-01 | End: 2025-07-30

## 2025-07-01 RX ADMIN — FLUOXETINE HYDROCHLORIDE 20 MILLIGRAM(S): 20 CAPSULE ORAL at 08:59

## 2025-07-01 NOTE — BH INPATIENT PSYCHIATRY PROGRESS NOTE - NSBHCHARTREVIEWVS_PSY_A_CORE FT
Vital Signs Last 24 Hrs  T(C): 36.6 (07-01-25 @ 08:15), Max: 36.6 (07-01-25 @ 08:15)  T(F): 97.9 (07-01-25 @ 08:15), Max: 97.9 (07-01-25 @ 08:15)  HR: --  BP: --  BP(mean): --  RR: --  SpO2: --    Orthostatic VS  07-01-25 @ 08:15  Lying BP: --/-- HR: --  Sitting BP: 117/80 HR: 66  Standing BP: 102/74 HR: 94  Site: --  Mode: --  Orthostatic VS  06-30-25 @ 07:10  Lying BP: --/-- HR: --  Sitting BP: 131/75 HR: 86  Standing BP: 113/76 HR: 95  Site: --  Mode: electronic  Orthostatic VS  06-29-25 @ 20:17  Lying BP: --/-- HR: --  Sitting BP: 122/70 HR: 70  Standing BP: 113/76 HR: 75  Site: upper left arm  Mode: electronic  
Vital Signs Last 24 Hrs  T(C): 36.8 (06-30-25 @ 07:10), Max: 36.8 (06-30-25 @ 07:10)  T(F): 98.2 (06-30-25 @ 07:10), Max: 98.2 (06-30-25 @ 07:10)  HR: --  BP: --  BP(mean): --  RR: --  SpO2: --    Orthostatic VS  06-30-25 @ 07:10  Lying BP: --/-- HR: --  Sitting BP: 131/75 HR: 86  Standing BP: 113/76 HR: 95  Site: --  Mode: electronic  Orthostatic VS  06-29-25 @ 20:17  Lying BP: --/-- HR: --  Sitting BP: 122/70 HR: 70  Standing BP: 113/76 HR: 75  Site: upper left arm  Mode: electronic  Orthostatic VS  06-29-25 @ 00:40  Lying BP: --/-- HR: --  Sitting BP: 118/73 HR: 72  Standing BP: 105/74 HR: 77  Site: --  Mode: --

## 2025-07-01 NOTE — BH DISCHARGE NOTE NURSING/SOCIAL WORK/PSYCH REHAB - NSDCPRRECOMMEND_PSY_ALL_CORE
Upon discharge, it is recommended that patient utilize outpatient treatment services to sustain noted improvement, continue practicing healthy coping skills, and expand their inventory of supportive strategies.   Psychiatric Rehabilitation Staff recommends that patient continue to explore and utilize coping skills for improved symptom management and long-term recovery. It is also recommended that patient follow up with outpatient providers for continued medication management, support, and psychotherapy.

## 2025-07-01 NOTE — BH INPATIENT PSYCHIATRY PROGRESS NOTE - NSBHMSEBEHAV_PSY_A_CORE
suspected
anxiously perseverative w/ gross inattention iso anxiety intermittently/Other
anxiously perseverative w/ gross inattention iso anxiety intermittently/Other

## 2025-07-01 NOTE — BH DISCHARGE NOTE NURSING/SOCIAL WORK/PSYCH REHAB - NSBHDCADDR1FT_A_CORE
I have personally seen and examined the patient. I have collaborated with and supervised the
virtual appointment

## 2025-07-01 NOTE — BH INPATIENT PSYCHIATRY PROGRESS NOTE - OTHER
deneis si/hi intents or plans, no delusions or hallucinations elicited but he is pleasantly guarded less eprseverative improved somewhat less restricted "good" somewhat formally guarded limited/under assessment less perseverative. he is superficially cooperative. still some anxiety but improved, less distressed appearing

## 2025-07-01 NOTE — BH DISCHARGE NOTE NURSING/SOCIAL WORK/PSYCH REHAB - FINANCIAL ASSISTANCE
[facility] provides services at a reduced cost to those who are determined to be eligible through [facility]’s financial assistance program. Information regarding [facility]’s financial assistance program can be found by going to [email or web address] or by calling [phone].
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
## 2025-07-01 NOTE — BH INPATIENT PSYCHIATRY PROGRESS NOTE - NSBHMETABOLIC_PSY_ALL_CORE_FT
BMI: BMI (kg/m2): 19.9 (06-26-25 @ 14:10)  HbA1c: A1C with Estimated Average Glucose Result: 5.2 % (05-21-25 @ 11:10)    Glucose:   BP: 108/68 (06-29-25 @ 00:11) (108/68 - 118/76)Vital Signs Last 24 Hrs  T(C): 36.8 (06-30-25 @ 07:10), Max: 36.8 (06-30-25 @ 07:10)  T(F): 98.2 (06-30-25 @ 07:10), Max: 98.2 (06-30-25 @ 07:10)  HR: --  BP: --  BP(mean): --  RR: --  SpO2: --    Orthostatic VS  06-30-25 @ 07:10  Lying BP: --/-- HR: --  Sitting BP: 131/75 HR: 86  Standing BP: 113/76 HR: 95  Site: --  Mode: electronic  Orthostatic VS  06-29-25 @ 20:17  Lying BP: --/-- HR: --  Sitting BP: 122/70 HR: 70  Standing BP: 113/76 HR: 75  Site: upper left arm  Mode: electronic  Orthostatic VS  06-29-25 @ 00:40  Lying BP: --/-- HR: --  Sitting BP: 118/73 HR: 72  Standing BP: 105/74 HR: 77  Site: --  Mode: --    Lipid Panel: Date/Time: 05-22-25 @ 09:26  Cholesterol, Serum: 195  LDL Cholesterol Calculated: 63  HDL Cholesterol, Serum: 119  Total Cholesterol/HDL Ration Measurement: --  Triglycerides, Serum: 67  
BMI: BMI (kg/m2): 19.9 (06-26-25 @ 14:10)  HbA1c: A1C with Estimated Average Glucose Result: 5.2 % (05-21-25 @ 11:10)    Glucose:   BP: 108/68 (06-29-25 @ 00:11) (108/68 - 118/76)Vital Signs Last 24 Hrs  T(C): 36.6 (07-01-25 @ 08:15), Max: 36.6 (07-01-25 @ 08:15)  T(F): 97.9 (07-01-25 @ 08:15), Max: 97.9 (07-01-25 @ 08:15)  HR: --  BP: --  BP(mean): --  RR: --  SpO2: --    Orthostatic VS  07-01-25 @ 08:15  Lying BP: --/-- HR: --  Sitting BP: 117/80 HR: 66  Standing BP: 102/74 HR: 94  Site: --  Mode: --  Orthostatic VS  06-30-25 @ 07:10  Lying BP: --/-- HR: --  Sitting BP: 131/75 HR: 86  Standing BP: 113/76 HR: 95  Site: --  Mode: electronic  Orthostatic VS  06-29-25 @ 20:17  Lying BP: --/-- HR: --  Sitting BP: 122/70 HR: 70  Standing BP: 113/76 HR: 75  Site: upper left arm  Mode: electronic    Lipid Panel: Date/Time: 05-22-25 @ 09:26  Cholesterol, Serum: 195  LDL Cholesterol Calculated: 63  HDL Cholesterol, Serum: 119  Total Cholesterol/HDL Ration Measurement: --  Triglycerides, Serum: 67

## 2025-07-01 NOTE — BH TREATMENT PLAN - NSTXMEDICINTERPR_PSY_ALL_CORE
Patient met specified Psychiatric Rehabilitation goal of being able to describe the benefits of medication/treatment.   Progress toward completing goal was evidenced by patient’s ability describe feeling more uplifted as a benefit of medication/treatment.

## 2025-07-01 NOTE — BH INPATIENT PSYCHIATRY PROGRESS NOTE - CURRENT MEDICATION
MEDICATIONS  (STANDING):  FLUoxetine 20 milliGRAM(s) Oral daily    MEDICATIONS  (PRN):  hydrOXYzine hydrochloride 25 milliGRAM(s) Oral two times a day PRN Anxiety in the setting of acute psychiatric illness  hydrOXYzine hydrochloride 50 milliGRAM(s) Oral every 6 hours PRN anxiety  OLANZapine Injectable 5 milliGRAM(s) IntraMuscular once PRN Severe agitation secondary to psychosis, antonia, or poor impulse control.  QUEtiapine 50 milliGRAM(s) Oral every 6 hours PRN anxiety  
MEDICATIONS  (STANDING):  FLUoxetine 20 milliGRAM(s) Oral daily    MEDICATIONS  (PRN):  hydrOXYzine hydrochloride 25 milliGRAM(s) Oral two times a day PRN Anxiety in the setting of acute psychiatric illness  hydrOXYzine hydrochloride 50 milliGRAM(s) Oral every 6 hours PRN anxiety  OLANZapine Injectable 5 milliGRAM(s) IntraMuscular once PRN Severe agitation secondary to psychosis, antonia, or poor impulse control.  QUEtiapine 50 milliGRAM(s) Oral every 6 hours PRN anxiety

## 2025-07-01 NOTE — BH INPATIENT PSYCHIATRY PROGRESS NOTE - NSBHASSESSSUMMFT_PSY_ALL_CORE
29 year old unemployed  with multiple inpatient admissions,  carries multiple diagnoses and talks about his problems in terms of anxiety and "OCD".  He is here on a 913 legal status.  He is rather anxious and uncomfortable appearing and was seeking ativan and submitted a 72 hour letter when told that ativan not available to him.  Diagnostically he may be suffering from substance induced psychosis (utox+amphetamine) or perhaps some sort of anxiety or OCD.  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  Although I observe no jo psychotic symptoms, his anxiety may be in response to something of psychotic proportions, although this is speculation on my part.  He submitted a 72 hour letter in the am of 6/29.  I discussed increased dose of prozac and or standing quetiapine for him but he declines.  Ativan is not appropriate as he is likely already too fond of it and it seems to be presenting a possibility for dependence with him.  Provided education about quetiapine and hydroxyzine.      7/1--sleeps, less distressed appearing, less rigid and perseverative, somewhat more comfortable.  presents as anxiety and panic attacks.  Will continue prozac and encourage quetiapine.  if he maintains current presentation, difficult to say he meets criteria for involuntary admission and will likely dc tomorrow (3 day letter).    1. Continue 913 hospitalization for safety and assessment  2 haldol and atarax prns, discussed and making quetiapine 50mg po q six hours prn available to him  3. continue to offer prozac 20mg daily.  4. he substmitte a 72 hour lette which is due 7/2.  He merits continued assessment for diagnsosis and safety especially given his uncomfortable apeparance.        Patient is a 29 year old single male, has no dependents.  Patient domiciles in private residence with family (sister and father), currently unemployed ((last employed as a  ~1 year ago).  Patient has PPH Anxiety Disorder, panic disorder, OCD, bipolar vs other affective disorder, ADHD. Patient has hx of stimulant induced psychotic disorder.  Patient has 2 prior inpatient hospitalizations. Most recently at [facility] 6/17, referred for outpatient care at [facility] where he had initial appointment 6/20 and 6/23, multiple ED visits, s/p discharge from [facility] on 6/19 and 6/26.  BIB self, requesting voluntary psychiatric admission for management of anxiety.  Patient is re-admitted with a primary problem of emotional decompensation, with anxiety.  Patient admitted to [facility] on a 9.13 legal status.     PLAN:   1. Admit on 9.13 legal status  2. Safety: no need for CO 1:1, no current SIIP/HIIP  3. Psychiatric:  - c/w home medication of Prozac 20mg daily for now. Defer to primary team for further medication management.   -----> PLEASE AVOID USE OF BENZODIAZEPINES FOR ANXIETY    PRNs: Please attempt verbal de-escalation and behavioral intervention first  Agitation: Zyprexa 5mg PO/IM q6h prn  Anxiety: Atarax 25mg BID   *Please avoid use of antipsychotics if QTC>500  4. Medical:  - no known significant PMHx, Admission labs/CT WNL  5. Individual/group/milleu therapy, as indicated  6. Dispo: pending clinical improvement

## 2025-07-01 NOTE — BH INPATIENT PSYCHIATRY PROGRESS NOTE - NSDCCRITERIA_PSY_ALL_CORE
Symptom Stabilization  Optimize medications  CGI<=3  Outpatient services f/u  Possible DBT/CBT as outpatient  Consider referral to outpt dual diagnosis program  
Symptom Stabilization  Optimize medications  CGI<=3  Outpatient services f/u  Possible DBT/CBT as outpatient  Consider referral to outpt dual diagnosis program

## 2025-07-01 NOTE — BH DISCHARGE NOTE NURSING/SOCIAL WORK/PSYCH REHAB - NSDCPRGOAL_PSY_ALL_CORE
Over the course of the current hospitalization, Psychiatric Rehabilitation Staff and patient discussed level of functioning, addressed concerns surrounding the hospitalization and discharge, engaged in skill development, and participated in safety planning.   Patient met specified Psychiatric Rehabilitation goal of being able to describe the benefits of medication/treatment.   Progress toward completing goal was evidenced by patient’s ability describe feeling more uplifted as a benefit of medication/treatment.   Throughout the hospitalization, patient minimally attended most psychiatric rehabilitation groups offered. Patient demonstrated appropriate behavior and engagement within milieu.   Patient completed a safety plan prior to discharge in collaboration with their assigned therapist. A copy of the safety plan was provided upon discharge for reference.

## 2025-07-01 NOTE — BH DISCHARGE NOTE NURSING/SOCIAL WORK/PSYCH REHAB - NSCDUDCCRISIS_PSY_A_CORE
.  [facility] Crisis [facility] for Children, Adults and Families  [address] [place] [address]  Mobile Crisis Hotline – [phone] [phone]/.  [facility]  1 (069) LIFENET ([phone])/.  [facility]  [phone]/.  [facility]  [address] [place], [place] [address] [phone]   Hours:  Monday through Friday from 9 AM to 3 PM/988 Suicide and Crisis Lifeline

## 2025-07-01 NOTE — BH INPATIENT PSYCHIATRY PROGRESS NOTE - NSBHCHARTREVIEWLAB_PSY_A_CORE FT
Admission labs reviewed no acute findings  BAL <10   utox +amphetamine  UA unremarkable  TSH 1.84  
Admission labs reviewed no acute findings  BAL <10   utox +amphetamine  UA unremarkable  TSH 1.84

## 2025-07-01 NOTE — BH INPATIENT PSYCHIATRY PROGRESS NOTE - NSBHFUPINTERVALCCFT_PSY_A_CORE
Patient seen, chart reviewed, case discussed with team.  patient seen for follow up of reported anxiety.
Patient seen, chart reviewed, case discussed with team.  patient seen for follow up of reported anxiety.

## 2025-07-01 NOTE — BH DISCHARGE NOTE NURSING/SOCIAL WORK/PSYCH REHAB - PATIENT PORTAL LINK FT
You can access the FollowMyHealth Patient Portal offered by [facility] by registering at the following website: [email or web address]. By joining [employer]’s FollowMyHealth portal, you will also be able to view your health information using other applications (apps) compatible with our system.

## 2025-07-01 NOTE — BH INPATIENT PSYCHIATRY PROGRESS NOTE - PRN MEDS
MEDICATIONS  (PRN):  hydrOXYzine hydrochloride 25 milliGRAM(s) Oral two times a day PRN Anxiety in the setting of acute psychiatric illness  hydrOXYzine hydrochloride 50 milliGRAM(s) Oral every 6 hours PRN anxiety  OLANZapine Injectable 5 milliGRAM(s) IntraMuscular once PRN Severe agitation secondary to psychosis, antonia, or poor impulse control.  QUEtiapine 50 milliGRAM(s) Oral every 6 hours PRN anxiety  
MEDICATIONS  (PRN):  hydrOXYzine hydrochloride 25 milliGRAM(s) Oral two times a day PRN Anxiety in the setting of acute psychiatric illness  hydrOXYzine hydrochloride 50 milliGRAM(s) Oral every 6 hours PRN anxiety  OLANZapine Injectable 5 milliGRAM(s) IntraMuscular once PRN Severe agitation secondary to psychosis, antonia, or poor impulse control.  QUEtiapine 50 milliGRAM(s) Oral every 6 hours PRN anxiety

## 2025-07-02 VITALS — TEMPERATURE: 98 F

## 2025-07-02 RX ADMIN — FLUOXETINE HYDROCHLORIDE 20 MILLIGRAM(S): 20 CAPSULE ORAL at 08:17

## 2025-07-02 NOTE — BH INPATIENT PSYCHIATRY DISCHARGE NOTE - NSBHFUPINTERVALHXFT_PSY_A_CORE
No interval events, calmer, complaint with prozac no prns.  Less distressed appearing.  Denies si/hi intents or plans, avt hallucinations and delusions.  Reports he feels like "I came here because of a panic attack and I am feeling better now" and continues to desire discharge.  He attributes his symptoms to worry and anxiety about "my situation" and describes anxious about running out of money, not working and so forth.  He himself has articulated no psychotic content (nor any suicidal ideation) to us but collateral have reported historic content.  I speculate with him that he might be experiencing disturbing phenomena such as voices or beliefs and perhaps he should try quetiapine (which is available as a prn) but he reports he will stay with prozac.  He agrees that if he is discharged on his 72 hour letter he will travel to [facility] to be with his family.  no interval events, slept overnight, compliant with prozac, does not use prns.  more visible on the unit, eats.  His 72 hour letter is expiring.  He is distressed appearing but is apparently still somewhat anxious as he relates "I control my anxiety through exercise and taking showers" and he spends a lot of time in the shower this morning.  Nonetheless, he consistently denied si/hi intents or plans, avt hallucinations and delusions and he is future oriented.  He reported he came to the hospital because he felt that he was having a panic attacked and now reports that he feels better.  He does not meet criteria for involuntary hospitalization and is being discharged on a three day letter.  He has follow up at [facility].  I discharge him with a 30 day supply of prozac 20mg daily and quetiapine 50mg twice daily prn.  The plan is for him to drive [facility] to attend a family gathering.

## 2025-07-02 NOTE — BH INPATIENT PSYCHIATRY DISCHARGE NOTE - NSDCMRMEDTOKEN_GEN_ALL_CORE_FT
FLUoxetine 20 mg oral capsule: 1 cap(s) orally once a day  QUEtiapine 50 mg oral tablet: 1 tab(s) orally 2 times a day as needed for anxiety

## 2025-07-02 NOTE — BH INPATIENT PSYCHIATRY DISCHARGE NOTE - NSBHASSESSSUMMFT_PSY_ALL_CORE
29 year old unemployed  with multiple inpatient admissions,  carries multiple diagnoses and talks about his problems in terms of anxiety and "OCD".  He is here on a 913 legal status.  He is rather anxious and uncomfortable appearing and was seeking ativan and submitted a 72 hour letter when told that ativan not available to him.  Diagnostically he may be suffering from substance induced psychosis (utox+amphetamine) or perhaps some sort of anxiety or OCD.  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  Although I observe no jo psychotic symptoms, his anxiety may be in response to something of psychotic proportions, although this is speculation on my part.  He submitted a 72 hour letter in the am of 6/29.  I discussed increased dose of prozac and or standing quetiapine for him but he declines.  Ativan is not appropriate as he is likely already too fond of it and it seems to be presenting a possibility for dependence with him.  Provided education about quetiapine and hydroxyzine.      7/1--sleeps, less distressed appearing, less rigid and perseverative, somewhat more comfortable.  presents as anxiety and panic attacks.  Will continue prozac and encourage quetiapine.  if he maintains current presentation, difficult to say he meets criteria for involuntary admission and will likely dc tomorrow (3 day letter).    1. Continue 913 hospitalization for safety and assessment  2 haldol and atarax prns, discussed and making quetiapine 50mg po q six hours prn available to him  3. continue to offer prozac 20mg daily.  4. he substmitte a 72 hour lette which is due 7/2.  He merits continued assessment for diagnsosis and safety especially given his uncomfortable apeparance.        Patient is a 29 year old single male, has no dependents.  Patient domiciles in private residence with family (sister and father), currently unemployed ((last employed as a  ~1 year ago).  Patient has PPH Anxiety Disorder, panic disorder, OCD, bipolar vs other affective disorder, ADHD. Patient has hx of stimulant induced psychotic disorder.  Patient has 2 prior inpatient hospitalizations. Most recently at [facility] 6/17, referred for outpatient care at [facility] where he had initial appointment 6/20 and 6/23, multiple ED visits, s/p discharge from [facility] on 6/19 and 6/26.  BIB self, requesting voluntary psychiatric admission for management of anxiety.  Patient is re-admitted with a primary problem of emotional decompensation, with anxiety.  Patient admitted to [facility] on a 9.13 legal status.     PLAN:   1. Admit on 9.13 legal status  2. Safety: no need for CO 1:1, no current SIIP/HIIP  3. Psychiatric:  - c/w home medication of Prozac 20mg daily for now. Defer to primary team for further medication management.   -----> PLEASE AVOID USE OF BENZODIAZEPINES FOR ANXIETY    PRNs: Please attempt verbal de-escalation and behavioral intervention first  Agitation: Zyprexa 5mg PO/IM q6h prn  Anxiety: Atarax 25mg BID   *Please avoid use of antipsychotics if QTC>500  4. Medical:  - no known significant PMHx, Admission labs/CT WNL  5. Individual/group/milleu therapy, as indicated  6. Dispo: pending clinical improvement       29 year old unemployed  with multiple inpatient admissions,  carries multiple diagnoses and talks about his problems in terms of anxiety and "OCD".  He is here on a 913 legal status.  He is rather anxious and uncomfortable appearing and was seeking ativan and submitted a 72 hour letter when told that ativan not available to him.  He reported no ativan iin sometime.    Diagnostically he may be suffering from substance induced psychosis (utox+amphetamine) or perhaps some sort of anxiety or OCD.  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  Again, no jo psychosis elicited here but he has exhibited bizarre behaviors.  Stimulant abuse could explain (and dx substance induced psychosis in past) and utox +amphetamine here.  I observe no jo psychotic symptoms, his anxiety may be in response to something of psychotic proportions, although this is speculation on my part.  He submitted a 72 hour letter in the am of .  I discussed increased dose of prozac and or standing quetiapine for him but he declines.  Ativan is not appropriate as he is likely already too fond of it and it seems to be presenting a possibility for dependence with him.  Provided education about quetiapine and hydroxyzine and he declined.  His 72 hour letter  today ().  There has been no suicidal or aggressive behavior and he has consistently denied si/hi intents or plans.  Although he seemed anxious and distressed earlier in hospitalization, he is much less distressed.  He reports good sleep.  Despite my suspicions, there are no jo delusions or auditory hallucinations or psychotic content, although he is somewhat guarded and arguably evasive.  There is no increased goal directed activity or decreased need for sleep.  There is no hopelessness or helplessness.  Although I think he would benefit from treatment and I have told him as much, he does not meet criteria for involuntary admission, submitted a 72 hour letter which is expiring and he is therefore being discharged.   He has follow up at [facility].  He is discharged with a 30 day supply of prozac 20mg daily ant quetiapine 50mg twice daily prn anxiety which I recommended, discussed risks and benefits and which he declined.

## 2025-07-02 NOTE — BH INPATIENT PSYCHIATRY DISCHARGE NOTE - NSBHSUICIDESTATUS_PSY_ALL_CORE
Compared to admission, patient is greatly improved and is not an acute danger to self or others.  However, given certain historical facts, personal attributes and experiences, patient is still at chronic risk for harm to self and others.  Where possible, risk factors present at/during  hospitalization have been addressed as follows:   Patient is at an unalterable chronically elevated risk of suicide because of his male gender, family history of mental illness, history of adhd, history of reported amphetamine use.  He has a history of impulsive behaviors, affective dysregulation and irritability that similarly unalterable and chronically elevated.  He has no history of suicide attempts which is protective.    He presented with certain symptoms that consitutted modifiable acute risk factors for suicidal behavior.  These included severe anxiety, insomnia, irritability and some impulsivity.  We note at time of discharge there has been no suicidality or self injurious behavior noted on the unit.  He reports improved anxiety and he sleeps.  He has evidenced good impulse control and though there is some ild irritability, it is of doubtful clinical significance.  No jo psychosis has been elicited nor has any suicidality been elicited during his stay on the unit.      Episode appears to be an acute exacerbation of a chronic issue.  Review of recent charts suggests a recent disappointing job interviewplays some part.  For his part, patient relates that he is stressed and anxious because of his unemployment and dwindling finances.  We note patient did present with a urine positive for amphetamine which may have contributed to presentation although he argues that it is helpful and helps him concentrate.      He denies access to firearms and is able to safety plan reducing risk.    He gave us permission to talk with his father and provided psychoeducation to his father in hopes that this will furtherhelp.      There was no violence associated with this episode.  However, patient has a history of affective dysregulation, impulsivity and irritability placing him at an increased risk for violence.  At the time of discharge he has good affective regulation and impulse control and only mild irritability and has shown no evidence of violence or aggression.        Pt was provided with pharmacotherapy and psychotherapy throughout this hospitalization and upon discharge was instructed to practice the provided safe coping mechanisms and to take prescribed medication only as directed.   Pt was informed of the benefits and risks of current psychiatric medication including but not limited to EPS, TD, NMS, and metabolic syndrome. Pt advised to call 911 if sx worsen, the discussed life-threatening side effects occur, SI/HI develop, or pt becomes acutely dangerous to self or others. Pt voiced understanding and agreement.

## 2025-07-02 NOTE — BH INPATIENT PSYCHIATRY DISCHARGE NOTE - HOSPITAL COURSE
uneventful 29 year old unemployed  with multiple inpatient admissions,  carries multiple diagnoses and talks about his problems in terms of anxiety and "OCD".  He is here on a 913 legal status.  He is rather anxious and uncomfortable appearing and was seeking ativan and submitted a 72 hour letter when told that ativan not available to him.  He reported no ativan iin sometime.    Diagnostically he may be suffering from substance induced psychosis (utox+amphetamine) or perhaps some sort of anxiety or OCD.  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  Again, no jo psychosis elicited here but he has exhibited bizarre behaviors.  Stimulant abuse could explain (and dx substance induced psychosis in past) and utox +amphetamine here.  I observe no jo psychotic symptoms, his anxiety may be in response to something of psychotic proportions, although this is speculation on my part.  He submitted a 72 hour letter in the am of .  I discussed increased dose of prozac and or standing quetiapine for him but he declines.  Ativan is not appropriate as he is likely already too fond of it and it seems to be presenting a possibility for dependence with him.  Provided education about quetiapine and hydroxyzine and he declined.  His 72 hour letter  today ().  There has been no suicidal or aggressive behavior and he has consistently denied si/hi intents or plans.  Although he seemed anxious and distressed earlier in hospitalization, he is much less distressed.  He reports good sleep.  Despite my suspicions, there are no jo delusions or auditory hallucinations or psychotic content, although he is somewhat guarded and arguably evasive.  There is no increased goal directed activity or decreased need for sleep.  There is no hopelessness or helplessness.  Although I think he would benefit from treatment and I have told him as much, he does not meet criteria for involuntary admission, submitted a 72 hour letter which is expiring and he is therefore being discharged.   He has follow up at [facility].  He is discharged with a 30 day supply of prozac 20mg daily ant quetiapine 50mg twice daily prn anxiety which I recommended, discussed risks and benefits and which he declined.

## 2025-07-02 NOTE — BH INPATIENT PSYCHIATRY DISCHARGE NOTE - NSBHMETABOLIC_PSY_ALL_CORE_FT
BMI: BMI (kg/m2): 19.6 (07-01-25 @ 08:15)  HbA1c: A1C with Estimated Average Glucose Result: 5.2 % (05-21-25 @ 11:10)    Glucose:   BP: --Vital Signs Last 24 Hrs  T(C): 36.6 (07-02-25 @ 08:22), Max: 36.6 (07-02-25 @ 08:22)  T(F): 97.8 (07-02-25 @ 08:22), Max: 97.8 (07-02-25 @ 08:22)  HR: --  BP: --  BP(mean): --  RR: --  SpO2: --    Orthostatic VS  07-02-25 @ 08:22  Lying BP: --/-- HR: --  Sitting BP: 108/75 HR: 58  Standing BP: 115/70 HR: 68  Site: --  Mode: --  Orthostatic VS  07-01-25 @ 08:15  Lying BP: --/-- HR: --  Sitting BP: 117/80 HR: 66  Standing BP: 102/74 HR: 94  Site: --  Mode: --    Lipid Panel: Date/Time: 05-22-25 @ 09:26  Cholesterol, Serum: 195  LDL Cholesterol Calculated: 63  HDL Cholesterol, Serum: 119  Total Cholesterol/HDL Ration Measurement: --  Triglycerides, Serum: 67

## 2025-07-02 NOTE — BH INPATIENT PSYCHIATRY DISCHARGE NOTE - OTHER
limited/under assessment somewhat less restricted somewhat formally guarded deneis si/hi intents or plans, no delusions or hallucinations elicited but he is pleasantly guarded less perseverative. he is superficially cooperative. improved "good" still some anxiety but improved, less distressed appearing less eprseverative partial erna  superficially cooperative. continued trending towards euthymic, anxiety present but attenuated; much less distressed appearing less perseverative denies si/hi intents or plans, no delusions or hallucinations elicited but he is politely guarded

## 2025-07-02 NOTE — BH INPATIENT PSYCHIATRY DISCHARGE NOTE - NSBHFUPINTERVALCCFT_PSY_A_CORE
Patient seen, chart reviewed, case discussed with team.  patient seen for follow up of reported anxiety.

## 2025-07-02 NOTE — BH INPATIENT PSYCHIATRY DISCHARGE NOTE - NSBHDCRISKMITIGATEFT_PSY_ALL_CORE
no jo suicidality associated with this episode.    RISK ASSESSMENT  Compared to admission, patient is greatly improved and is not an acute danger to self or others.  However, given certain historical facts, personal attributes and experiences, patient is still at chronic risk for harm to self and others.  Where possible, risk factors present at/during  hospitalization have been addressed as follows:   Patient is at an unalterable chronically elevated risk of suicide because of his male gender, family history of mental illness, history of adhd, history of reported amphetamine use.  He has a history of impulsive behaviors, affective dysregulation and irritability that similarly unalterable and chronically elevated.  He has no history of suicide attempts which is protective.    He presented with certain symptoms that consitutted modifiable acute risk factors for suicidal behavior.  These included severe anxiety, insomnia, irritability and some impulsivity.  We note at time of discharge there has been no suicidality or self injurious behavior noted on the unit.  He reports improved anxiety and he sleeps.  He has evidenced good impulse control and though there is some ild irritability, it is of doubtful clinical significance.  No jo psychosis has been elicited nor has any suicidality been elicited during his stay on the unit.      Episode appears to be an acute exacerbation of a chronic issue.  Review of recent charts suggests a recent disappointing job interviewplays some part.  For his part, patient relates that he is stressed and anxious because of his unemployment and dwindling finances.  We note patient did present with a urine positive for amphetamine which may have contributed to presentation although he argues that it is helpful and helps him concentrate.      He denies access to firearms and is able to safety plan reducing risk.    He gave us permission to talk with his father and provided psychoeducation to his father in hopes that this will furtherhelp.      There was no violence associated with this episode.  However, patient has a history of affective dysregulation, impulsivity and irritability placing him at an increased risk for violence.  At the time of discharge he has good affective regulation and impulse control and only mild irritability and has shown no evidence of violence or aggression.        Pt was provided with pharmacotherapy and psychotherapy throughout this hospitalization and upon discharge was instructed to practice the provided safe coping mechanisms and to take prescribed medication only as directed.   Pt was informed of the benefits and risks of current psychiatric medication including but not limited to EPS, TD, NMS, and metabolic syndrome. Pt advised to call 911 if sx worsen, the discussed life-threatening side effects occur, SI/HI develop, or pt becomes acutely dangerous to self or others. Pt voiced understanding and agreement.

## 2025-07-02 NOTE — BH INPATIENT PSYCHIATRY DISCHARGE NOTE - REASON FOR ADMISSION
You had yourself brought to the hospital because you were extremely anxious in the setting of multiple stressors

## 2025-07-02 NOTE — BH INPATIENT PSYCHIATRY DISCHARGE NOTE - OTHER PAST PSYCHIATRIC HISTORY (INCLUDE DETAILS REGARDING ONSET, COURSE OF ILLNESS, INPATIENT/OUTPATIENT TREATMENT)
Pt is a 29 yr old unemployed , with a history of several recent brief hospitalizations, who has been unable to maintain a job. He has been very anxious with panic attacks and screams out loud at home and hits himself due to his anxiety. Pt was in [facility] in February, then moved to [place] with his mother briefly where he worked at a [employer] for 3 days then quit. He was also hospitalized while in [place]. Pt was at [facility] for 4 days at the end of May 2025 and was also in [facility] since then. Pt came to the hospital voluntarily but then submitted a 3DL shortly after arriving. Pt states he wants to go [facility] with his father for the July 4th holiday and is looking forward to celebrating his birthday in 1 week.

## 2025-07-02 NOTE — BH INPATIENT PSYCHIATRY DISCHARGE NOTE - NSTOBACCOUSAGEY/N_GEN_A_CS
ED nurse-to-nurse bedside report    Chief Complaint   Patient presents with    Suicidal      LOC: alert and orientated to name, place, date  Vital signs   Vitals:    02/04/20 2123 02/05/20 0402   BP: (!) 177/105 (!) 157/95   Pulse: 90 84   Resp: 18 16   Temp: 98.5 °F (36.9 °C)    TempSrc: Oral    SpO2: 93% 94%      Pain:    Pain Interventions: pt denies pain  Pain Goal: no pain  Oxygen: No    Current needs required redraw at 0630   Telemetry: No  LDAs:    Continuous Infusions:   Mobility: Independent  Staples Fall Risk Score: No flowsheet data found.   Fall Interventions: pt has constant supervision, bed in lowest position  Report given to: JACK Moreno  02/05/20 0224 No

## 2025-07-02 NOTE — BH INPATIENT PSYCHIATRY DISCHARGE NOTE - HPI (INCLUDE ILLNESS QUALITY, SEVERITY, DURATION, TIMING, CONTEXT, MODIFYING FACTORS, ASSOCIATED SIGNS AND SYMPTOMS)
Patient was seen and evaluated, chart, medications and labs reviewed. Case discussed with nursing team.  On service for this 29 year old single male, has no dependents.  Patient domiciles in private residence with family (sister and father), currently unemployed ((last employed as a  ~1 year ago).  Patient has PPH Anxiety Disorder, panic disorder, OCD, bipolar vs other affective disorder, ADHD. Patient has hx of stimulant induced psychotic disorder.  Patient has 2 prior inpatient hospitalizations. Most recently at [facility] , referred for outpatient care at [facility] where he had initial appointment  and , multiple ED visits, s/p discharge from [facility] on  and .  BIB self, requesting voluntary psychiatric admission for management of anxiety.  Patient is re-admitted with a primary problem of emotional decompensation, with anxiety.  Patient admitted to [facility] on a . legal status. I have reviewed the initial psychiatric assessment in the electronic medical record, including the history of present illness, past psychiatric history, family/social history (no pertinent changes), and exam, and have confirmed the salient findings dated 25.  As per chart review, transferring records indicated the followin yo male, domiciled with father and sister, unemployed (last employed as a  ~1 year ago),history of anxiety, panic disorder, OCD, bipolar vs other affective disorder, ADHD, hx of stimulant induced psychotic disorder, two recent psychiatric hospitalizations including d/c from [facility] , referred for outpatient care at [facility] where he had initial appointment  and , multiple ED visits, s/p discharge from [facility] on  and , denies hx of SAs/NSSIB, BIB self, requesting voluntary psychiatric admission for management of anxiety.  On exam, patient is initially calm and cooperative, but becomes intermittently agitated, throughout the assessment, when challenged about requests for Ativan and use of the phone. Of note, patient perseverates on request for Ativan throughout interview. Upon approach, patient immediately asks this writer for "one time dose of Ativan" and states that he brought himself to the ED to possibly obtain a prescription for Ativan.  Patient subjectively reports chronic anxiety symptoms, as he states that he has been "in and out of the hospital a lot since February", although he provides vague description of these symptoms when asked to elaborate. Patient endorses severe anxiety attacks "due to my life circumstances". Patient reports experiencing a panic attack, earlier today, in which he states he was "crying, screaming, yelling". States that this panic attack prompted today's ED visit. Patient identifies multiple stressors including uncertainty about recent job interview and financial instability. He states that he had recent intake with [facility] for continuation of outpatient care. He states that he would like to "continue Prozac with them" because he was informed of their no controlled substance policy. When asked about reasons for recent request for discharge while voluntarily admitted to [facility], patient states that he "really bad visit, and I cannot go back to that hospital". Patient also states, "I had a prescription for Ativan" but I threw it away because I was at [facility] and they stopped it". When informed that patient cannot be assured that Ativan will prescribed on inpatient unit, if admitted, patient rescinds his request for voluntary hospitalization and becomes increasingly hostile. Patient initially states " I feel like I might be a harm to myself". When asked to elaborate, he states "just cutting" . Denies hx of or  recent NSSIB. Patient adamantly denies current SIIP/HIIP/AVH. Reports eating and sleeping appropriately (states that he has been sleeping ~8-12 hours/day).  No sxs of antonia or psychosis elicited. Denies substance use. Patient reports hx of Adderall use. States that he was last prescribed Adderall in 2024 by his previous psychiatrist. He reports that this was discontinued due to suspected psychosis (states that he was throwing things around the house). Reports taking a dose of Adderall, which was given to him by his father.  before coming to the ED because "it helps with the anxiety and to stay focused". States that  Adderall was from his father's own prescribed supply. Reports that he was discharged from hospitalization at  in May 2025 on Prozac 20mg. Reports daily compliance with this patient, although he reports that "Ativan works better".   Addendum: Patient was determined not meet threshold for involuntary hospitalization and declined voluntary hospitalization. He was then subsequently discharged from [facility]. Patient returned to the ED requesting  voluntary admission, upon the behest of his family. When challenged about information provided by collateral, patient denies making statement about wanting to "end it all". Also states that he repeatedly takes showers in the home (and does pushups--which was observed in the ED) when distressed/anxious.   Collateral from father, Steve ([phone]): In the last year, father reports that patient's mental health has declined. He reports that patient has "fits", in which he appears frightened and is crying. States that patient has become increasingly "erratic". When asked to elaborate, he states that he "goes missing in the house" and "goes and sits in a dark room alone". Reports that he has become more reclusive. Also references an incident, occurring a year ago, in which the patient was "throwing all my things away". Denies patient has been agitated or aggressive towards others, but expresses safety concerns because "his sister is petrified".  About 1-1.5 months ago, he reports that patient disclosed to family that he wanted to "end it all". Father states that patient did not clearly specify whether this was in reference to the anxiety or self-harm. Reports that patient has never taken steps toward harming himself. States that patient has been maintaining ADLs independently. Expresses concerns about the patient showering for "2 hrs, up to 5 hours a day". Advocating for admission.    On unit: information came from chart review and patient interview:  In today’s interview patient reported that his mood to be “anxious”  he reports feeling very anxious and requested Ativan standing.   Patient submitted a 3DL this morning at 0913, stating he wants to be discharged in the morning, when asked about this pt replied “the only way I’ll stay here is if I get Ativan prescribed”   Patient endorses depressive symptoms due symptoms of constant anxiety and COD. Patient presents irritable, and in edge during interview.    Patient denies any current AVH, delusions, psychotic disorganization, mind reading abilities, thought insertion, ideas of reference, special monsalve, or thought broadcasting or paranoia. Denies history of aggression or violence. No access to firearm. Patient denies any symptoms suggestive of active antonia: (denied grandiosity/ racing thoughts/ increased goal directed activities or engaged in risk taking behavior/ no pressured speech/ no elevated mood/ denied any increased in energy level causing sleep disruption), no signs of catatonia (with no signs of mutism, negativism, stereotypy, echolalia, echopraxia, posturing or rigidity. He reports obsessive, intrusive and persistent thoughts and compulsive, ritualistic acts are reported. 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 Denies past trauma.   No PMH.

## 2025-09-16 ENCOUNTER — EMERGENCY (EMERGENCY)
Facility: HOSPITAL | Age: 30
LOS: 1 days | End: 2025-09-16
Admitting: EMERGENCY MEDICINE
Payer: MEDICAID

## 2025-09-16 VITALS
RESPIRATION RATE: 16 BRPM | HEART RATE: 73 BPM | SYSTOLIC BLOOD PRESSURE: 126 MMHG | HEIGHT: 68 IN | OXYGEN SATURATION: 99 % | TEMPERATURE: 98 F | DIASTOLIC BLOOD PRESSURE: 76 MMHG | WEIGHT: 139.99 LBS

## 2025-09-16 DIAGNOSIS — F42.9 OBSESSIVE-COMPULSIVE DISORDER, UNSPECIFIED: ICD-10-CM

## 2025-09-16 LAB
ALBUMIN SERPL ELPH-MCNC: 4.7 G/DL — SIGNIFICANT CHANGE UP (ref 3.3–5)
ALP SERPL-CCNC: 46 U/L — SIGNIFICANT CHANGE UP (ref 40–120)
ALT FLD-CCNC: 35 U/L — SIGNIFICANT CHANGE UP (ref 4–41)
AMPHET UR-MCNC: NEGATIVE — SIGNIFICANT CHANGE UP
ANION GAP SERPL CALC-SCNC: 18 MMOL/L — HIGH (ref 7–14)
APAP SERPL-MCNC: <10 UG/ML — LOW (ref 15–25)
APPEARANCE UR: CLEAR — SIGNIFICANT CHANGE UP
AST SERPL-CCNC: 34 U/L — SIGNIFICANT CHANGE UP (ref 4–40)
BARBITURATES UR SCN-MCNC: NEGATIVE — SIGNIFICANT CHANGE UP
BASOPHILS # BLD AUTO: 0.04 K/UL — SIGNIFICANT CHANGE UP (ref 0–0.2)
BASOPHILS NFR BLD AUTO: 0.6 % — SIGNIFICANT CHANGE UP (ref 0–2)
BENZODIAZ UR-MCNC: NEGATIVE — SIGNIFICANT CHANGE UP
BILIRUB SERPL-MCNC: 0.4 MG/DL — SIGNIFICANT CHANGE UP (ref 0.2–1.2)
BILIRUB UR-MCNC: NEGATIVE — SIGNIFICANT CHANGE UP
BUN SERPL-MCNC: 20 MG/DL — SIGNIFICANT CHANGE UP (ref 7–23)
CALCIUM SERPL-MCNC: 9.8 MG/DL — SIGNIFICANT CHANGE UP (ref 8.4–10.5)
CHLORIDE SERPL-SCNC: 104 MMOL/L — SIGNIFICANT CHANGE UP (ref 98–107)
CO2 SERPL-SCNC: 19 MMOL/L — LOW (ref 22–31)
COCAINE METAB.OTHER UR-MCNC: NEGATIVE — SIGNIFICANT CHANGE UP
COLOR SPEC: YELLOW — SIGNIFICANT CHANGE UP
CREAT SERPL-MCNC: 0.83 MG/DL — SIGNIFICANT CHANGE UP (ref 0.5–1.3)
CREATININE URINE RESULT, DAU: 70 MG/DL — SIGNIFICANT CHANGE UP
DIFF PNL FLD: NEGATIVE — SIGNIFICANT CHANGE UP
EGFR: 120 ML/MIN/1.73M2 — SIGNIFICANT CHANGE UP
EGFR: 120 ML/MIN/1.73M2 — SIGNIFICANT CHANGE UP
EOSINOPHIL # BLD AUTO: 0.05 K/UL — SIGNIFICANT CHANGE UP (ref 0–0.5)
EOSINOPHIL NFR BLD AUTO: 0.8 % — SIGNIFICANT CHANGE UP (ref 0–6)
ETHANOL SERPL-MCNC: <10 MG/DL — SIGNIFICANT CHANGE UP
FENTANYL UR QL SCN: NEGATIVE — SIGNIFICANT CHANGE UP
GLUCOSE SERPL-MCNC: 85 MG/DL — SIGNIFICANT CHANGE UP (ref 70–99)
GLUCOSE UR QL: NEGATIVE MG/DL — SIGNIFICANT CHANGE UP
HCT VFR BLD CALC: 44.4 % — SIGNIFICANT CHANGE UP (ref 39–50)
HGB BLD-MCNC: 15.2 G/DL — SIGNIFICANT CHANGE UP (ref 13–17)
IMM GRANULOCYTES # BLD AUTO: 0.01 K/UL — SIGNIFICANT CHANGE UP (ref 0–0.07)
IMM GRANULOCYTES NFR BLD AUTO: 0.2 % — SIGNIFICANT CHANGE UP (ref 0–0.9)
KETONES UR QL: NEGATIVE MG/DL — SIGNIFICANT CHANGE UP
LEUKOCYTE ESTERASE UR-ACNC: NEGATIVE — SIGNIFICANT CHANGE UP
LYMPHOCYTES # BLD AUTO: 1.16 K/UL — SIGNIFICANT CHANGE UP (ref 1–3.3)
LYMPHOCYTES NFR BLD AUTO: 18.4 % — SIGNIFICANT CHANGE UP (ref 13–44)
MCHC RBC-ENTMCNC: 29.7 PG — SIGNIFICANT CHANGE UP (ref 27–34)
MCHC RBC-ENTMCNC: 34.2 G/DL — SIGNIFICANT CHANGE UP (ref 32–36)
MCV RBC AUTO: 86.9 FL — SIGNIFICANT CHANGE UP (ref 80–100)
METHADONE UR-MCNC: NEGATIVE — SIGNIFICANT CHANGE UP
MONOCYTES # BLD AUTO: 0.45 K/UL — SIGNIFICANT CHANGE UP (ref 0–0.9)
MONOCYTES NFR BLD AUTO: 7.1 % — SIGNIFICANT CHANGE UP (ref 2–14)
NEUTROPHILS # BLD AUTO: 4.61 K/UL — SIGNIFICANT CHANGE UP (ref 1.8–7.4)
NEUTROPHILS NFR BLD AUTO: 72.9 % — SIGNIFICANT CHANGE UP (ref 43–77)
NITRITE UR-MCNC: NEGATIVE — SIGNIFICANT CHANGE UP
NRBC # BLD AUTO: 0 K/UL — SIGNIFICANT CHANGE UP (ref 0–0)
NRBC # FLD: 0 K/UL — SIGNIFICANT CHANGE UP (ref 0–0)
NRBC BLD AUTO-RTO: 0 /100 WBCS — SIGNIFICANT CHANGE UP (ref 0–0)
OPIATES UR-MCNC: NEGATIVE — SIGNIFICANT CHANGE UP
OXYCODONE UR-MCNC: NEGATIVE — SIGNIFICANT CHANGE UP
PCP SPEC-MCNC: SIGNIFICANT CHANGE UP
PCP UR-MCNC: NEGATIVE — SIGNIFICANT CHANGE UP
PH UR: 7 — SIGNIFICANT CHANGE UP (ref 5–8)
PLATELET # BLD AUTO: 288 K/UL — SIGNIFICANT CHANGE UP (ref 150–400)
PMV BLD: 9.4 FL — SIGNIFICANT CHANGE UP (ref 7–13)
POTASSIUM SERPL-MCNC: 3.9 MMOL/L — SIGNIFICANT CHANGE UP (ref 3.5–5.3)
POTASSIUM SERPL-SCNC: 3.9 MMOL/L — SIGNIFICANT CHANGE UP (ref 3.5–5.3)
PROT SERPL-MCNC: 7.1 G/DL — SIGNIFICANT CHANGE UP (ref 6–8.3)
PROT UR-MCNC: NEGATIVE MG/DL — SIGNIFICANT CHANGE UP
RBC # BLD: 5.11 M/UL — SIGNIFICANT CHANGE UP (ref 4.2–5.8)
RBC # FLD: 12.4 % — SIGNIFICANT CHANGE UP (ref 10.3–14.5)
SALICYLATES SERPL-MCNC: <0.3 MG/DL — LOW (ref 15–30)
SODIUM SERPL-SCNC: 141 MMOL/L — SIGNIFICANT CHANGE UP (ref 135–145)
SP GR SPEC: 1.02 — SIGNIFICANT CHANGE UP (ref 1–1.03)
THC UR QL: NEGATIVE — SIGNIFICANT CHANGE UP
TOXICOLOGY SCREEN, DRUGS OF ABUSE, SERUM RESULT: SIGNIFICANT CHANGE UP
TSH SERPL-MCNC: 1.33 UIU/ML — SIGNIFICANT CHANGE UP (ref 0.27–4.2)
UROBILINOGEN FLD QL: 0.2 MG/DL — SIGNIFICANT CHANGE UP (ref 0.2–1)
WBC # BLD: 6.32 K/UL — SIGNIFICANT CHANGE UP (ref 3.8–10.5)
WBC # FLD AUTO: 6.32 K/UL — SIGNIFICANT CHANGE UP (ref 3.8–10.5)

## 2025-09-16 PROCEDURE — 99285 EMERGENCY DEPT VISIT HI MDM: CPT

## 2025-09-16 PROCEDURE — 93010 ELECTROCARDIOGRAM REPORT: CPT
